# Patient Record
Sex: FEMALE
[De-identification: names, ages, dates, MRNs, and addresses within clinical notes are randomized per-mention and may not be internally consistent; named-entity substitution may affect disease eponyms.]

---

## 2020-11-11 ENCOUNTER — TRANSCRIPTION ENCOUNTER (OUTPATIENT)
Age: 70
End: 2020-11-11

## 2021-02-18 PROBLEM — Z00.00 ENCOUNTER FOR PREVENTIVE HEALTH EXAMINATION: Status: ACTIVE | Noted: 2021-02-18

## 2021-02-19 VITALS — WEIGHT: 180 LBS | HEIGHT: 64 IN | BODY MASS INDEX: 30.73 KG/M2

## 2021-02-22 ENCOUNTER — APPOINTMENT (OUTPATIENT)
Dept: NEUROSURGERY | Facility: CLINIC | Age: 71
End: 2021-02-22
Payer: MEDICAID

## 2021-02-22 DIAGNOSIS — Z78.9 OTHER SPECIFIED HEALTH STATUS: ICD-10-CM

## 2021-02-22 DIAGNOSIS — I10 ESSENTIAL (PRIMARY) HYPERTENSION: ICD-10-CM

## 2021-02-22 DIAGNOSIS — Z86.69 PERSONAL HISTORY OF OTHER DISEASES OF THE NERVOUS SYSTEM AND SENSE ORGANS: ICD-10-CM

## 2021-02-22 DIAGNOSIS — Z87.19 PERSONAL HISTORY OF OTHER DISEASES OF THE DIGESTIVE SYSTEM: ICD-10-CM

## 2021-02-22 PROCEDURE — 99241 OFFICE CONSULTATION NEW/ESTAB PATIENT 15 MIN: CPT

## 2021-02-22 RX ORDER — FAMOTIDINE 40 MG/1
40 TABLET, FILM COATED ORAL
Refills: 0 | Status: ACTIVE | COMMUNITY

## 2021-02-22 RX ORDER — LOSARTAN POTASSIUM AND HYDROCHLOROTHIAZIDE 12.5; 5 MG/1; MG/1
50-12.5 TABLET ORAL
Refills: 0 | Status: ACTIVE | COMMUNITY

## 2021-02-22 NOTE — PLAN
[FreeTextEntry1] : Reviewed MRI findings with the patient and family member, since her symptoms has been gradually improving with physical therapy, she will complete her sessions, and then follow up in 6 weeks for reassessment. All questions were answered. \par \par Case and MRI reviewed with Dr. Bustillo.

## 2021-02-22 NOTE — REASON FOR VISIT
[New Patient Visit] : a new patient visit [Referred By: _________] : Patient was referred by JAY [Family Member] : family member

## 2021-02-22 NOTE — HISTORY OF PRESENT ILLNESS
[FreeTextEntry1] : back pain  [de-identified] : This is a 70 yrs old [place]-speaking only female, who presents reporting of mid back pain radiating to the anterior and posterior legs down to the feet. Today she reports her back and leg pain has improved since participating in physical therapy. No longer has numbness in her lower extremities. Denies urinary and/or bowel incontinence. Denies gait/balance disturbances. \par \par MRI of the thoracic spine w/o contrast done on 2/04/21 showed at T12-L1 disc herniation.

## 2021-04-12 ENCOUNTER — APPOINTMENT (OUTPATIENT)
Dept: NEUROSURGERY | Facility: CLINIC | Age: 71
End: 2021-04-12
Payer: MEDICAID

## 2021-04-12 DIAGNOSIS — M51.25 OTHER INTERVERTEBRAL DISC DISPLACEMENT, THORACOLUMBAR REGION: ICD-10-CM

## 2021-04-12 PROCEDURE — 99212 OFFICE O/P EST SF 10 MIN: CPT

## 2021-04-12 NOTE — HISTORY OF PRESENT ILLNESS
[FreeTextEntry1] : Ms. Lopez, who initially presented last month with back pain radiating to anteroposterior legs, found to have T12-L1 disc herniation and left hip arthropathy, presents today after attending physical therapy. She reports her overall back pain is much better, only has pain in the morning, and when ambulating stairs.

## 2023-01-06 NOTE — PHYSICAL EXAM
[General Appearance - Alert] : alert [General Appearance - In No Acute Distress] : in no acute distress [Person] : oriented to person [Place] : oriented to place [Time] : oriented to time [Cranial Nerves Optic (II)] : visual acuity intact bilaterally,  pupils equal round and reactive to light [Cranial Nerves Oculomotor (III)] : extraocular motion intact [Cranial Nerves Trigeminal (V)] : facial sensation intact symmetrically [Cranial Nerves Facial (VII)] : face symmetrical [Cranial Nerves Vestibulocochlear (VIII)] : hearing was intact bilaterally [Cranial Nerves Glossopharyngeal (IX)] : tongue and palate midline [Cranial Nerves Accessory (XI - Cranial And Spinal)] : head turning and shoulder shrug symmetric [Cranial Nerves Hypoglossal (XII)] : there was no tongue deviation with protrusion [Motor Tone] : muscle tone was normal in all four extremities [Motor Strength] : muscle strength was normal in all four extremities [2+] : Patella left 2+ [Straight-Leg Raise Test - Left] : straight leg raise of the left leg was negative [Straight-Leg Raise Test - Right] : straight leg raise  of the right leg was negative [Antalgic] : antalgic [FreeTextEntry2] : tenderness to palpation on paraspinal muscles [FreeTextEntry5] : SHARIF + on left Him/He

## 2025-02-19 ENCOUNTER — NON-APPOINTMENT (OUTPATIENT)
Age: 75
End: 2025-02-19

## 2025-03-10 ENCOUNTER — NON-APPOINTMENT (OUTPATIENT)
Age: 75
End: 2025-03-10

## 2025-03-13 ENCOUNTER — APPOINTMENT (OUTPATIENT)
Dept: CARDIOTHORACIC SURGERY | Facility: CLINIC | Age: 75
End: 2025-03-13
Payer: MEDICARE

## 2025-03-13 ENCOUNTER — APPOINTMENT (OUTPATIENT)
Dept: CARDIOLOGY | Facility: CLINIC | Age: 75
End: 2025-03-13

## 2025-03-13 VITALS
SYSTOLIC BLOOD PRESSURE: 128 MMHG | TEMPERATURE: 98.3 F | HEART RATE: 90 BPM | HEIGHT: 64 IN | WEIGHT: 187 LBS | DIASTOLIC BLOOD PRESSURE: 85 MMHG | RESPIRATION RATE: 16 BRPM | BODY MASS INDEX: 31.92 KG/M2 | OXYGEN SATURATION: 96 %

## 2025-03-13 VITALS
TEMPERATURE: 98.3 F | HEART RATE: 90 BPM | BODY MASS INDEX: 31.92 KG/M2 | HEIGHT: 64 IN | DIASTOLIC BLOOD PRESSURE: 85 MMHG | SYSTOLIC BLOOD PRESSURE: 128 MMHG | WEIGHT: 187 LBS | OXYGEN SATURATION: 96 % | RESPIRATION RATE: 16 BRPM

## 2025-03-13 DIAGNOSIS — I34.0 NONRHEUMATIC MITRAL (VALVE) INSUFFICIENCY: ICD-10-CM

## 2025-03-13 DIAGNOSIS — R00.2 PALPITATIONS: ICD-10-CM

## 2025-03-13 PROCEDURE — 99204 OFFICE O/P NEW MOD 45 MIN: CPT

## 2025-03-13 RX ORDER — ACETAMINOPHEN 325 MG/1
325 TABLET ORAL
Refills: 0 | Status: ACTIVE | COMMUNITY

## 2025-03-13 RX ORDER — LOSARTAN POTASSIUM AND HYDROCHLOROTHIAZIDE 12.5; 5 MG/1; MG/1
50-12.5 TABLET ORAL
Refills: 0 | Status: ACTIVE | COMMUNITY

## 2025-03-13 RX ORDER — GLUCOSAMINE SULFATE DIPOT CHLR 1000 MG
TABLET ORAL
Refills: 0 | Status: ACTIVE | COMMUNITY

## 2025-03-13 RX ORDER — MELOXICAM 15 MG/1
15 TABLET ORAL
Refills: 0 | Status: ACTIVE | COMMUNITY

## 2025-03-13 RX ORDER — ASPIRIN 81 MG/1
81 TABLET, CHEWABLE ORAL
Refills: 0 | Status: ACTIVE | COMMUNITY

## 2025-03-13 RX ORDER — LORATADINE 10 MG/1
10 TABLET ORAL
Refills: 0 | Status: ACTIVE | COMMUNITY

## 2025-03-14 LAB
ALBUMIN SERPL ELPH-MCNC: 4.8 G/DL
ALP BLD-CCNC: 79 U/L
ALT SERPL-CCNC: 19 U/L
ANION GAP SERPL CALC-SCNC: 15 MMOL/L
AST SERPL-CCNC: 18 U/L
BILIRUB SERPL-MCNC: 0.3 MG/DL
BUN SERPL-MCNC: 34 MG/DL
CALCIUM SERPL-MCNC: 10 MG/DL
CHLORIDE SERPL-SCNC: 100 MMOL/L
CO2 SERPL-SCNC: 24 MMOL/L
CREAT SERPL-MCNC: 1.4 MG/DL
EGFRCR SERPLBLD CKD-EPI 2021: 40 ML/MIN/1.73M2
GLUCOSE SERPL-MCNC: 115 MG/DL
HCT VFR BLD CALC: 39.6 %
HGB BLD-MCNC: 12.9 G/DL
INR PPP: 0.92 RATIO
MCHC RBC-ENTMCNC: 27.9 PG
MCHC RBC-ENTMCNC: 32.6 G/DL
MCV RBC AUTO: 85.7 FL
NT-PROBNP SERPL-MCNC: 254 PG/ML
PLATELET # BLD AUTO: 311 K/UL
PMV BLD AUTO: 0 /100 WBCS
PMV BLD: 10.2 FL
POTASSIUM SERPL-SCNC: 3.9 MMOL/L
PROT SERPL-MCNC: 7.6 G/DL
PT BLD: 10.8 SEC
RBC # BLD: 4.62 M/UL
RBC # FLD: 12.7 %
SODIUM SERPL-SCNC: 139 MMOL/L
WBC # FLD AUTO: 8.73 K/UL

## 2025-03-18 ENCOUNTER — NON-APPOINTMENT (OUTPATIENT)
Age: 75
End: 2025-03-18

## 2025-03-18 PROBLEM — I34.0 NONRHEUMATIC MITRAL VALVE REGURGITATION: Status: ACTIVE | Noted: 2025-03-13

## 2025-04-12 VITALS
HEART RATE: 76 BPM | SYSTOLIC BLOOD PRESSURE: 137 MMHG | DIASTOLIC BLOOD PRESSURE: 60 MMHG | RESPIRATION RATE: 16 BRPM | WEIGHT: 186.95 LBS | HEIGHT: 64 IN | OXYGEN SATURATION: 98 %

## 2025-04-12 NOTE — H&P CARDIOLOGY - HISTORY OF PRESENT ILLNESS
Patient is a 74y Female PMH HTN, HLD, gastric ulcer, migraines  reports ARORA, fatigue and palpitations. Pt found to have severe MR on echo performed at outside facility (no report in EMR), recommend R/LHC and CAMRYN in preparation for for possible DAVID vs mitral repair    Pre cath note:  indication:  [ ] STEMI                [ ] NSTEMI                 [ ] Acute coronary syndrome                   [ ]Unstable Angina   [ ] high risk  [ ] intermediate risk  [ ] low risk                   [ ] Stable Angina     non-invasive testing:        echo                  Date:                     result: [ ] high risk  [ ] intermediate risk  [ ] low risk    Anti- Anginal medications:                    [ ] not used d/t                     [ ] used   ( ) BB     ( ) CCB      ( ) Nitrate   (  ) Ranexa          [ ] not used but strong indication not to use    Ejection Fraction                   [ ] <29            [ ] 30-39%   [ ] 40-49%     [ ]>50%    CHF          [ ] active (within last 14 days on meds   [ ] Chronic (on meds but no exacerbation)  NYHA Functional Class:  (  ) Class I (no limitations)  (  ) Class II (slight limitation)  (  ) Class III (marked limitation)  (  ) Class IV (symptoms at rest)    COPD                   [ ] mild (on chronic bronchodilators)  [ ] moderate (on chronic steroid therapy)      [ ] severe (indication for home O2 or PACO2 >50)    Other risk factors:                     [ ] Previous MI                     [ ] CVA/ stroke                    [ ] carotid stent/ CEA                    [ ] PVD/PAD- (arterial aneurysm, non-palpable pulses, tortuous vessel with inability to insert catheter, infra-renal dissection, renal or subclavian artery stenosis)                    [ ] previous CABG                    [ ] Renal Failure:  on HD  (  ) yes  (  ) no                    [ ] Diabetic  (  ) Type 1  (  ) Type 2                                         (  ) Insulin dependent  (  ) non-insulin dependent                                         (  ) Metformin  (  ) Januvia  (  ) Glimepiride  (  ) Glipizide  (  ) Glyburide  (  ) Actos                                         (  ) GLP-1 receptor agonists (Ozempic, Mounjaro, Wegovy, Zepbound, Trulicity, Byetta, Victoza)                                         (  ) SGLT2 Inhibitors (Farxiga, Jardiance, Invokana)                                         (  ) Other    Bleeding Risk: 2.3%    Pre-cath Hydration: (  )  cc IV bolus x 1 over 1 hr followed by:    (  ) NS @ 75cc/hr until procedure (up to 2 hrs) if EF> 50%                                                                                                                             (  ) NS @ 50cc/hr until procedure (up to 2 hrs) if EF< 50%                                        (  ) No precath hydration d/t      RIGHT RADIAL ARTERY EVALUATION:  FLORA TEST: [] Negative          [] Positive    EF: n/a  Date:    EKG:   Date: Patient is a 74y Female PMH HTN, HLD, gastric ulcer, migraines,  reports ARORA, fatigue and palpitations. Pt found to have severe MR on echo performed at outside facility (no report in EMR), recommend R/LHC and CAMRYN in preparation for for possible DAVID vs mitral repair    Pre cath note:  indication:  [ ] STEMI                [ ] NSTEMI                 [ ] Acute coronary syndrome                   [ ]Unstable Angina   [ ] high risk  [ ] intermediate risk  [ ] low risk                   [ ] Stable Angina     non-invasive testing:        echo                  Date:                     result: [ ] high risk  [x ] intermediate risk  [ ] low risk    Anti- Anginal medications:                    [ ] not used d/t                     [x ] used   ( ) BB     ( x) CCB      ( ) Nitrate   ( x ) Ranexa          [ ] not used but strong indication not to use    Ejection Fraction                   [ ] <29            [ ] 30-39%   [ ] 40-49%     [ ]>50%    CHF          [ ] active (within last 14 days on meds   [ ] Chronic (on meds but no exacerbation)  NYHA Functional Class:  (  ) Class I (no limitations)  ( x ) Class II (slight limitation)  (  ) Class III (marked limitation)  (  ) Class IV (symptoms at rest)    COPD                   [ ] mild (on chronic bronchodilators)  [ ] moderate (on chronic steroid therapy)      [ ] severe (indication for home O2 or PACO2 >50)    Other risk factors:                     [ ] Previous MI                     [ ] CVA/ stroke                    [ ] carotid stent/ CEA                    [ ] PVD/PAD- (arterial aneurysm, non-palpable pulses, tortuous vessel with inability to insert catheter, infra-renal dissection, renal or subclavian artery stenosis)                    [ ] previous CABG                    [ ] Renal Failure:  on HD  (  ) yes  (  ) no                    [ ] Diabetic  (  ) Type 1  (  ) Type 2                                         (  ) Insulin dependent  (  ) non-insulin dependent                                         (  ) Metformin  (  ) Januvia  (  ) Glimepiride  (  ) Glipizide  (  ) Glyburide  (  ) Actos                                         (  ) GLP-1 receptor agonists (Ozempic, Mounjaro, Wegovy, Zepbound, Trulicity, Byetta, Victoza)                                         (  ) SGLT2 Inhibitors (Farxiga, Jardiance, Invokana)                                         (  ) Other    Bleeding Risk: 2.4%    Pre-cath Hydration: (x  )  cc IV bolus x 1 over 1 hr followed by:    ( x ) NS @ 75cc/hr until procedure (up to 2 hrs) if EF> 50%                                                                                                                                 RIGHT RADIAL ARTERY EVALUATION:  FLORA TEST: [] Negative          [x] Positive    EF: n/a  Date:   Patient is a 74y Female PMH HTN, HLD, gastric ulcer, migraines,  reports ARORA, fatigue and palpitations. Pt found to have severe MR on echo performed at outside facility (no report in EMR), recommend R/LHC and CAMRYN in preparation for for possible DAVID vs mitral repair    Pre cath note:  indication:  [ ] STEMI                [ ] NSTEMI                 [ ] Acute coronary syndrome                   [ ]Unstable Angina   [ ] high risk  [ ] intermediate risk  [ ] low risk                   [ ] Stable Angina     non-invasive testing:        echo                  Date: 2/2025                    result: [ ] high risk  [x ] intermediate risk  [ ] low risk    Anti- Anginal medications:                    [ ] not used d/t                     [x ] used   ( ) BB     ( x) CCB      ( ) Nitrate   ( x ) Ranexa          [ ] not used but strong indication not to use    Ejection Fraction                   [ ] <29            [ ] 30-39%   [ ] 40-49%     [ ]>50%    CHF          [ ] active (within last 14 days on meds   [ ] Chronic (on meds but no exacerbation)  NYHA Functional Class:  (  ) Class I (no limitations)  ( x ) Class II (slight limitation)  (  ) Class III (marked limitation)  (  ) Class IV (symptoms at rest)    COPD                   [ ] mild (on chronic bronchodilators)  [ ] moderate (on chronic steroid therapy)      [ ] severe (indication for home O2 or PACO2 >50)    Other risk factors:                     [ ] Previous MI                     [ ] CVA/ stroke                    [ ] carotid stent/ CEA                    [ ] PVD/PAD- (arterial aneurysm, non-palpable pulses, tortuous vessel with inability to insert catheter, infra-renal dissection, renal or subclavian artery stenosis)                    [ ] previous CABG                    [ ] Renal Failure:  on HD  (  ) yes  (  ) no                    [ ] Diabetic  (  ) Type 1  (  ) Type 2                                         (  ) Insulin dependent  (  ) non-insulin dependent                                         (  ) Metformin  (  ) Januvia  (  ) Glimepiride  (  ) Glipizide  (  ) Glyburide  (  ) Actos                                         (  ) GLP-1 receptor agonists (Ozempic, Mounjaro, Wegovy, Zepbound, Trulicity, Byetta, Victoza)                                         (  ) SGLT2 Inhibitors (Farxiga, Jardiance, Invokana)                                         (  ) Other    Bleeding Risk: 2.4%    Pre-cath Hydration: (x  )  cc IV bolus x 1 over 1 hr followed by:    ( x ) NS @ 75cc/hr until procedure (up to 2 hrs) if EF> 50%                                                                                                                                 RIGHT RADIAL ARTERY EVALUATION:  FLORA TEST: [] Negative          [x] Positive     Patient is a 74y Female PMH HTN, HLD, gastric ulcer, migraines,  reports ARORA, fatigue and palpitations. Pt found to have severe MR on echo performed at outside facility (no report in EMR), recommend R/LHC and CMARYN in preparation for for possible DAVID vs mitral repair    [employer] of [place]  # [id number] used     Pre cath note:  indication:  [ ] STEMI                [ ] NSTEMI                 [ ] Acute coronary syndrome                   [ ]Unstable Angina   [ ] high risk  [ ] intermediate risk  [ ] low risk                   [ ] Stable Angina     non-invasive testing:        echo                  Date: 2/2025                    result: [ ] high risk  [x ] intermediate risk  [ ] low risk    Anti- Anginal medications:                    [ ] not used d/t                     [x ] used   ( ) BB     ( x) CCB      ( ) Nitrate   ( x ) Ranexa          [ ] not used but strong indication not to use    Ejection Fraction                   [ ] <29            [ ] 30-39%   [ ] 40-49%     [ ]>50%    CHF          [ ] active (within last 14 days on meds   [ ] Chronic (on meds but no exacerbation)  NYHA Functional Class:  (  ) Class I (no limitations)  ( x ) Class II (slight limitation)  (  ) Class III (marked limitation)  (  ) Class IV (symptoms at rest)    COPD                   [ ] mild (on chronic bronchodilators)  [ ] moderate (on chronic steroid therapy)      [ ] severe (indication for home O2 or PACO2 >50)    Other risk factors:                     [ ] Previous MI                     [ ] CVA/ stroke                    [ ] carotid stent/ CEA                    [ ] PVD/PAD- (arterial aneurysm, non-palpable pulses, tortuous vessel with inability to insert catheter, infra-renal dissection, renal or subclavian artery stenosis)                    [ ] previous CABG                    [ ] Renal Failure:  on HD  (  ) yes  (  ) no                    [ ] Diabetic  (  ) Type 1  (  ) Type 2                                         (  ) Insulin dependent  (  ) non-insulin dependent                                         (  ) Metformin  (  ) Januvia  (  ) Glimepiride  (  ) Glipizide  (  ) Glyburide  (  ) Actos                                         (  ) GLP-1 receptor agonists (Ozempic, Mounjaro, Wegovy, Zepbound, Trulicity, Byetta, Victoza)                                         (  ) SGLT2 Inhibitors (Farxiga, Jardiance, Invokana)                                         (  ) Other    Bleeding Risk: 2.4%    Pre-cath Hydration: (x  )  cc IV bolus x 1 over 1 hr followed by:    ( x ) NS @ 75cc/hr until procedure (up to 2 hrs) if EF> 50%                                                                                                                                 RIGHT RADIAL ARTERY EVALUATION:  FLORA TEST: [] Negative          [x] Positive

## 2025-04-15 ENCOUNTER — OUTPATIENT (OUTPATIENT)
Dept: OUTPATIENT SERVICES | Facility: HOSPITAL | Age: 75
LOS: 1 days | Discharge: HOME | End: 2025-04-15
Payer: MEDICAID

## 2025-04-15 ENCOUNTER — RESULT REVIEW (OUTPATIENT)
Age: 75
End: 2025-04-15

## 2025-04-15 VITALS
SYSTOLIC BLOOD PRESSURE: 125 MMHG | OXYGEN SATURATION: 95 % | DIASTOLIC BLOOD PRESSURE: 62 MMHG | HEART RATE: 68 BPM | RESPIRATION RATE: 15 BRPM

## 2025-04-15 DIAGNOSIS — Z98.890 OTHER SPECIFIED POSTPROCEDURAL STATES: Chronic | ICD-10-CM

## 2025-04-15 DIAGNOSIS — I34.0 NONRHEUMATIC MITRAL (VALVE) INSUFFICIENCY: ICD-10-CM

## 2025-04-15 LAB
ANION GAP SERPL CALC-SCNC: 12 MMOL/L — SIGNIFICANT CHANGE UP (ref 7–14)
BUN SERPL-MCNC: 29 MG/DL — HIGH (ref 10–20)
CALCIUM SERPL-MCNC: 10.2 MG/DL — SIGNIFICANT CHANGE UP (ref 8.4–10.5)
CHLORIDE SERPL-SCNC: 103 MMOL/L — SIGNIFICANT CHANGE UP (ref 98–110)
CO2 SERPL-SCNC: 23 MMOL/L — SIGNIFICANT CHANGE UP (ref 17–32)
CREAT SERPL-MCNC: 1.1 MG/DL — SIGNIFICANT CHANGE UP (ref 0.7–1.5)
EGFR: 53 ML/MIN/1.73M2 — LOW
EGFR: 53 ML/MIN/1.73M2 — LOW
GLUCOSE SERPL-MCNC: 107 MG/DL — HIGH (ref 70–99)
HCT VFR BLD CALC: 36.2 % — LOW (ref 37–47)
HGB BLD-MCNC: 12.3 G/DL — SIGNIFICANT CHANGE UP (ref 12–16)
MCHC RBC-ENTMCNC: 29 PG — SIGNIFICANT CHANGE UP (ref 27–31)
MCHC RBC-ENTMCNC: 34 G/DL — SIGNIFICANT CHANGE UP (ref 32–37)
MCV RBC AUTO: 85.4 FL — SIGNIFICANT CHANGE UP (ref 81–99)
NRBC BLD AUTO-RTO: 0 /100 WBCS — SIGNIFICANT CHANGE UP (ref 0–0)
PLATELET # BLD AUTO: 251 K/UL — SIGNIFICANT CHANGE UP (ref 130–400)
PMV BLD: 9.7 FL — SIGNIFICANT CHANGE UP (ref 7.4–10.4)
POTASSIUM SERPL-MCNC: 4.3 MMOL/L — SIGNIFICANT CHANGE UP (ref 3.5–5)
POTASSIUM SERPL-SCNC: 4.3 MMOL/L — SIGNIFICANT CHANGE UP (ref 3.5–5)
RBC # BLD: 4.24 M/UL — SIGNIFICANT CHANGE UP (ref 4.2–5.4)
RBC # FLD: 13.3 % — SIGNIFICANT CHANGE UP (ref 11.5–14.5)
SODIUM SERPL-SCNC: 138 MMOL/L — SIGNIFICANT CHANGE UP (ref 135–146)
WBC # BLD: 6.58 K/UL — SIGNIFICANT CHANGE UP (ref 4.8–10.8)
WBC # FLD AUTO: 6.58 K/UL — SIGNIFICANT CHANGE UP (ref 4.8–10.8)

## 2025-04-15 PROCEDURE — 36415 COLL VENOUS BLD VENIPUNCTURE: CPT

## 2025-04-15 PROCEDURE — 85027 COMPLETE CBC AUTOMATED: CPT

## 2025-04-15 PROCEDURE — 80048 BASIC METABOLIC PNL TOTAL CA: CPT

## 2025-04-15 PROCEDURE — C1894: CPT

## 2025-04-15 PROCEDURE — C1887: CPT

## 2025-04-15 PROCEDURE — C1769: CPT

## 2025-04-15 PROCEDURE — 93306 TTE W/DOPPLER COMPLETE: CPT

## 2025-04-15 PROCEDURE — 93306 TTE W/DOPPLER COMPLETE: CPT | Mod: 26

## 2025-04-15 PROCEDURE — 93458 L HRT ARTERY/VENTRICLE ANGIO: CPT

## 2025-04-15 PROCEDURE — 93458 L HRT ARTERY/VENTRICLE ANGIO: CPT | Mod: 26

## 2025-04-15 RX ORDER — AMLODIPINE BESYLATE 10 MG/1
2.5 TABLET ORAL ONCE
Refills: 0 | Status: COMPLETED | OUTPATIENT
Start: 2025-04-15 | End: 2025-04-15

## 2025-04-15 RX ORDER — LORATADINE 5 MG/5ML
1 SOLUTION ORAL
Refills: 0 | DISCHARGE

## 2025-04-15 RX ORDER — ACETAMINOPHEN 500 MG/5ML
0 LIQUID (ML) ORAL
Refills: 0 | DISCHARGE

## 2025-04-15 RX ORDER — ASPIRIN 325 MG
81 TABLET ORAL ONCE
Refills: 0 | Status: COMPLETED | OUTPATIENT
Start: 2025-04-15 | End: 2025-04-15

## 2025-04-15 RX ORDER — LOSARTAN POTASSIUM 100 MG/1
1 TABLET, FILM COATED ORAL
Refills: 0 | DISCHARGE

## 2025-04-15 RX ORDER — RANOLAZINE 1000 MG/1
500 TABLET, FILM COATED, EXTENDED RELEASE ORAL ONCE
Refills: 0 | Status: COMPLETED | OUTPATIENT
Start: 2025-04-15 | End: 2025-04-15

## 2025-04-15 RX ORDER — MELOXICAM 15 MG/1
0 TABLET ORAL
Refills: 0 | DISCHARGE

## 2025-04-15 RX ORDER — ASPIRIN 325 MG
0 TABLET ORAL
Refills: 0 | DISCHARGE

## 2025-04-15 RX ADMIN — AMLODIPINE BESYLATE 2.5 MILLIGRAM(S): 10 TABLET ORAL at 09:44

## 2025-04-15 RX ADMIN — Medication 81 MILLIGRAM(S): at 09:40

## 2025-04-15 RX ADMIN — Medication 250 MILLILITER(S): at 09:40

## 2025-04-15 RX ADMIN — RANOLAZINE 500 MILLIGRAM(S): 1000 TABLET, FILM COATED, EXTENDED RELEASE ORAL at 09:44

## 2025-04-15 NOTE — ASU DISCHARGE PLAN (ADULT/PEDIATRIC) - ASU DC SPECIAL INSTRUCTIONSFT
Activity:  - Do not drive or operate heavy machinery for 24 hours.  - Limit your physical or any strenuous activity for 2 weeks after angioplasty and 48 hours for angiogram. Support the groin site with your hand when you sneeze or cough. No heavy lifting ( objects more then 10 pounds).  - For wrist access, avoid using affected arm for 24 hours after removal of dressing and avoid heavy lifting for 7 days.  Hygiene:  - After 24 hours, you may shower and remove the dressing from the site. Do not tub bathe for one week. Do not rub or apply lotion, cream, powder to the affected site. Leave it open to air.   Diet:   - You may resume your diet. Low Sodium. Low Fat, Low Cholesterol.  If Diabetic - Carbohydrate Consistent Diet.      - Drink extra fluid unless otherwise advised.   Special Instructions:  - Bruising or black and blue at the puncture site is possible.  - If there is bleeding from the puncture site (groin or wrist) apply direct firm pressure on the site and call 911.  - Any sudden swelling, redness, fever, discharge or severe pain, call your physician or call the cath lab.   - If you notice any scab formation in the area avoid touching the site and allow it to heal.  - Numbness or "pins and needle" sensation in the affected arm, hand, leg or if the affected site become cool to touch or pale that persist for extended period of time call your physician immediately to be checked.  - If you developed chest pain, not relieved by your usual routine medication, fainting, lethargy, weakness, report to the nearest emergency room.   - Inform your Dentist or Surgeon if you are taking Aspirin or any antiplatelet medications. Report any bleeding in your urine or stool.   Medications:  - Soreness or tenderness at the site is possible it will diminish over time. You may take Tylenol every 4-6 hours as needed. Nothing stronger is needed.  - If you are diabetic and taking medication containing Metformin, do not take them for 48 hours after the procedure.     Any questions call Cardiac Cath Lab at [phone] or [phone], Monday - Friday from 7 - 9 pm.

## 2025-04-15 NOTE — H&P CARDIOLOGY - HISTORY OF PRESENT ILLNESS
74 year old F with PMHx of HTN and severe MR on outside echo presents for CAMRYN in preparation for DAVID 74 year old F with PMHx of HTN and severe MR on outside echo presents for CAMRYN in preparation for DAVID vs surgical mitral repair 74 F referred for elective CAMRYN to evaluate severe MR.

## 2025-04-15 NOTE — ASU DISCHARGE PLAN (ADULT/PEDIATRIC) - CARE PROVIDER_API CALL
Danis Valenzuela  Interventional Cardiology  [address] [place] [address], [address]  [place] [address]  Phone: [phone]  Fax: [phone]  Follow Up Time: 2 weeks

## 2025-04-15 NOTE — ASU DISCHARGE PLAN (ADULT/PEDIATRIC) - SIGNS AND SYMPTOMS OF INFECTION: FEVER, REDNESS, SWELLING, FOUL SMELLING DISCHARGE
I have read and agree with the documentation of Mercedes Vee RN. Kate Urbina RN 
 
 Statement Selected

## 2025-04-15 NOTE — ASU DISCHARGE PLAN (ADULT/PEDIATRIC) - FINANCIAL ASSISTANCE
[facility] provides services at a reduced cost to those who are determined to be eligible through [facility]’s financial assistance program. Information regarding [facility]’s financial assistance program can be found by going to [email or web address] or by calling [phone].

## 2025-04-15 NOTE — CHART NOTE - NSCHARTNOTEFT_GEN_A_CORE
PRE-OP DIAGNOSIS:    Severe Mitral regurgitation    PROCEDURE:     [x] Coronary Angiogram     [x] LHC     [] LVG     [] RHC     [] Intervention (see below)         PHYSICIAN:  Danis Chau    ASSISTANT: Dr OSWALD Vasquez     PROCEDURE DESCRIPTION:     Consent:      [x] Patient     [] Family Member     []  Used        Anesthesia:     [] General     [x] Sedation     [x] Local        Access & Closure:     [x] 6 Fr Right Radial Artery (TR band closure)     [] Fr Femoral Artery     [] Fr Femoral Vein     [] Fr Brachial Vein       IV Contrast: 50 mL        Intervention: None      Implants: None       FINDINGS:     Coronary Dominance: Left      LM: No disease    LAD: Minor luminal irregularities in the distal LAD otherwise no disease   D1: No disease noted    CX: Minor Luminal Irregularities in the distal LCX otherwise no disease  OM1: Minor Luminal Irregularities   LPDA: Minor Luminal Irregularities    RCA: Mild disease in the Prox RCA.  - Small non-dominant vessel       LVEDP: 8 mmHg     EF: %        ESTIMATED BLOOD LOSS: < 10 mL        CONDITION:     [x] Good     [] Fair     [] Critical        SPECIMEN REMOVED: N/A       POST-OP DIAGNOSIS:      [X] Mild Coronary Artery Disease (< 50% stenosis)           PLAN OF CARE:     [X] D/C Home Today     [x] Medications: Losartan 50 QD,    [X] IV Fluids: NS @ 100/hr till the patient is discharged.

## 2025-04-24 ENCOUNTER — APPOINTMENT (OUTPATIENT)
Dept: CARDIOTHORACIC SURGERY | Facility: CLINIC | Age: 75
End: 2025-04-24

## 2025-04-24 ENCOUNTER — APPOINTMENT (OUTPATIENT)
Dept: CARDIOLOGY | Facility: CLINIC | Age: 75
End: 2025-04-24
Payer: COMMERCIAL

## 2025-04-24 VITALS
SYSTOLIC BLOOD PRESSURE: 133 MMHG | TEMPERATURE: 98.2 F | OXYGEN SATURATION: 93 % | HEIGHT: 64 IN | DIASTOLIC BLOOD PRESSURE: 81 MMHG | WEIGHT: 188 LBS | HEART RATE: 102 BPM | BODY MASS INDEX: 32.1 KG/M2 | RESPIRATION RATE: 16 BRPM

## 2025-04-24 PROCEDURE — 99214 OFFICE O/P EST MOD 30 MIN: CPT

## 2025-04-25 PROBLEM — I10 ESSENTIAL (PRIMARY) HYPERTENSION: Chronic | Status: ACTIVE | Noted: 2025-04-15

## 2025-04-25 PROBLEM — E78.5 HYPERLIPIDEMIA, UNSPECIFIED: Chronic | Status: ACTIVE | Noted: 2025-04-15

## 2025-04-25 PROBLEM — K25.9 GASTRIC ULCER, UNSPECIFIED AS ACUTE OR CHRONIC, WITHOUT HEMORRHAGE OR PERFORATION: Chronic | Status: ACTIVE | Noted: 2025-04-15

## 2025-04-30 ENCOUNTER — OUTPATIENT (OUTPATIENT)
Dept: OUTPATIENT SERVICES | Facility: HOSPITAL | Age: 75
LOS: 1 days | End: 2025-04-30
Payer: MEDICAID

## 2025-04-30 ENCOUNTER — RESULT REVIEW (OUTPATIENT)
Age: 75
End: 2025-04-30

## 2025-04-30 ENCOUNTER — NON-APPOINTMENT (OUTPATIENT)
Age: 75
End: 2025-04-30

## 2025-04-30 VITALS
DIASTOLIC BLOOD PRESSURE: 76 MMHG | SYSTOLIC BLOOD PRESSURE: 124 MMHG | RESPIRATION RATE: 18 BRPM | WEIGHT: 188.05 LBS | OXYGEN SATURATION: 99 % | HEART RATE: 74 BPM | TEMPERATURE: 98 F | HEIGHT: 64 IN

## 2025-04-30 DIAGNOSIS — Z98.890 OTHER SPECIFIED POSTPROCEDURAL STATES: Chronic | ICD-10-CM

## 2025-04-30 DIAGNOSIS — Z01.818 ENCOUNTER FOR OTHER PREPROCEDURAL EXAMINATION: ICD-10-CM

## 2025-04-30 DIAGNOSIS — I34.0 NONRHEUMATIC MITRAL (VALVE) INSUFFICIENCY: ICD-10-CM

## 2025-04-30 DIAGNOSIS — I34.9 NONRHEUMATIC MITRAL VALVE DISORDER, UNSPECIFIED: ICD-10-CM

## 2025-04-30 LAB
A1C WITH ESTIMATED AVERAGE GLUCOSE RESULT: 5.8 % — HIGH (ref 4–5.6)
ALBUMIN SERPL ELPH-MCNC: 4.6 G/DL — SIGNIFICANT CHANGE UP (ref 3.5–5.2)
ALP SERPL-CCNC: 73 U/L — SIGNIFICANT CHANGE UP (ref 30–115)
ALT FLD-CCNC: 17 U/L — SIGNIFICANT CHANGE UP (ref 0–41)
ANION GAP SERPL CALC-SCNC: 17 MMOL/L — HIGH (ref 7–14)
APPEARANCE UR: CLEAR — SIGNIFICANT CHANGE UP
APTT BLD: 30 SEC — SIGNIFICANT CHANGE UP (ref 27–39.2)
AST SERPL-CCNC: 16 U/L — SIGNIFICANT CHANGE UP (ref 0–41)
BACTERIA # UR AUTO: NEGATIVE /HPF — SIGNIFICANT CHANGE UP
BASOPHILS # BLD AUTO: 0.04 K/UL — SIGNIFICANT CHANGE UP (ref 0–0.2)
BASOPHILS NFR BLD AUTO: 0.5 % — SIGNIFICANT CHANGE UP (ref 0–1)
BILIRUB SERPL-MCNC: 0.3 MG/DL — SIGNIFICANT CHANGE UP (ref 0.2–1.2)
BILIRUB UR-MCNC: NEGATIVE — SIGNIFICANT CHANGE UP
BLD GP AB SCN SERPL QL: SIGNIFICANT CHANGE UP
BUN SERPL-MCNC: 41 MG/DL — HIGH (ref 10–20)
CALCIUM SERPL-MCNC: 9.5 MG/DL — SIGNIFICANT CHANGE UP (ref 8.4–10.5)
CAST: 11 /LPF — HIGH (ref 0–4)
CHLORIDE SERPL-SCNC: 100 MMOL/L — SIGNIFICANT CHANGE UP (ref 98–110)
CO2 SERPL-SCNC: 22 MMOL/L — SIGNIFICANT CHANGE UP (ref 17–32)
COLOR SPEC: YELLOW — SIGNIFICANT CHANGE UP
CREAT SERPL-MCNC: 1.4 MG/DL — SIGNIFICANT CHANGE UP (ref 0.7–1.5)
DIFF PNL FLD: ABNORMAL
EGFR: 40 ML/MIN/1.73M2 — LOW
EGFR: 40 ML/MIN/1.73M2 — LOW
EOSINOPHIL # BLD AUTO: 0.07 K/UL — SIGNIFICANT CHANGE UP (ref 0–0.7)
EOSINOPHIL NFR BLD AUTO: 0.9 % — SIGNIFICANT CHANGE UP (ref 0–8)
ESTIMATED AVERAGE GLUCOSE: 120 MG/DL — HIGH (ref 68–114)
GLUCOSE SERPL-MCNC: 113 MG/DL — HIGH (ref 70–99)
GLUCOSE UR QL: NEGATIVE MG/DL — SIGNIFICANT CHANGE UP
HCT VFR BLD CALC: 37.8 % — SIGNIFICANT CHANGE UP (ref 37–47)
HGB BLD-MCNC: 12.6 G/DL — SIGNIFICANT CHANGE UP (ref 12–16)
IMM GRANULOCYTES NFR BLD AUTO: 0.4 % — HIGH (ref 0.1–0.3)
INR BLD: 0.93 RATIO — SIGNIFICANT CHANGE UP (ref 0.65–1.3)
KETONES UR-MCNC: NEGATIVE MG/DL — SIGNIFICANT CHANGE UP
LEUKOCYTE ESTERASE UR-ACNC: ABNORMAL
LYMPHOCYTES # BLD AUTO: 1.52 K/UL — SIGNIFICANT CHANGE UP (ref 1.2–3.4)
LYMPHOCYTES # BLD AUTO: 19.8 % — LOW (ref 20.5–51.1)
MCHC RBC-ENTMCNC: 27.9 PG — SIGNIFICANT CHANGE UP (ref 27–31)
MCHC RBC-ENTMCNC: 33.3 G/DL — SIGNIFICANT CHANGE UP (ref 32–37)
MCV RBC AUTO: 83.8 FL — SIGNIFICANT CHANGE UP (ref 81–99)
MONOCYTES # BLD AUTO: 0.45 K/UL — SIGNIFICANT CHANGE UP (ref 0.1–0.6)
MONOCYTES NFR BLD AUTO: 5.9 % — SIGNIFICANT CHANGE UP (ref 1.7–9.3)
MRSA PCR RESULT.: NEGATIVE — SIGNIFICANT CHANGE UP
NEUTROPHILS # BLD AUTO: 5.57 K/UL — SIGNIFICANT CHANGE UP (ref 1.4–6.5)
NEUTROPHILS NFR BLD AUTO: 72.5 % — SIGNIFICANT CHANGE UP (ref 42.2–75.2)
NITRITE UR-MCNC: NEGATIVE — SIGNIFICANT CHANGE UP
NRBC BLD AUTO-RTO: 0 /100 WBCS — SIGNIFICANT CHANGE UP (ref 0–0)
NT-PROBNP SERPL-SCNC: 112 PG/ML — SIGNIFICANT CHANGE UP (ref 0–300)
PH UR: 5.5 — SIGNIFICANT CHANGE UP (ref 5–8)
PLATELET # BLD AUTO: 301 K/UL — SIGNIFICANT CHANGE UP (ref 130–400)
PMV BLD: 10 FL — SIGNIFICANT CHANGE UP (ref 7.4–10.4)
POTASSIUM SERPL-MCNC: 3.7 MMOL/L — SIGNIFICANT CHANGE UP (ref 3.5–5)
POTASSIUM SERPL-SCNC: 3.7 MMOL/L — SIGNIFICANT CHANGE UP (ref 3.5–5)
PROT SERPL-MCNC: 7.4 G/DL — SIGNIFICANT CHANGE UP (ref 6–8)
PROT UR-MCNC: NEGATIVE MG/DL — SIGNIFICANT CHANGE UP
PROTHROM AB SERPL-ACNC: 10.9 SEC — SIGNIFICANT CHANGE UP (ref 9.95–12.87)
RBC # BLD: 4.51 M/UL — SIGNIFICANT CHANGE UP (ref 4.2–5.4)
RBC # FLD: 13.2 % — SIGNIFICANT CHANGE UP (ref 11.5–14.5)
RBC CASTS # UR COMP ASSIST: 30 /HPF — HIGH (ref 0–4)
SODIUM SERPL-SCNC: 139 MMOL/L — SIGNIFICANT CHANGE UP (ref 135–146)
SP GR SPEC: 1.02 — SIGNIFICANT CHANGE UP (ref 1–1.03)
SQUAMOUS # UR AUTO: 2 /HPF — SIGNIFICANT CHANGE UP (ref 0–5)
UROBILINOGEN FLD QL: 0.2 MG/DL — SIGNIFICANT CHANGE UP (ref 0.2–1)
WBC # BLD: 7.68 K/UL — SIGNIFICANT CHANGE UP (ref 4.8–10.8)
WBC # FLD AUTO: 7.68 K/UL — SIGNIFICANT CHANGE UP (ref 4.8–10.8)
WBC UR QL: 3 /HPF — SIGNIFICANT CHANGE UP (ref 0–5)

## 2025-04-30 PROCEDURE — 93320 DOPPLER ECHO COMPLETE: CPT | Mod: 26

## 2025-04-30 PROCEDURE — 94729 DIFFUSING CAPACITY: CPT | Mod: 26

## 2025-04-30 PROCEDURE — 93010 ELECTROCARDIOGRAM REPORT: CPT

## 2025-04-30 PROCEDURE — 93321 DOPPLER ECHO F-UP/LMTD STD: CPT | Mod: 26,76

## 2025-04-30 PROCEDURE — 93312 ECHO TRANSESOPHAGEAL: CPT | Mod: 26,XU

## 2025-04-30 PROCEDURE — 93320 DOPPLER ECHO COMPLETE: CPT

## 2025-04-30 PROCEDURE — 94727 GAS DIL/WSHOT DETER LNG VOL: CPT | Mod: 26

## 2025-04-30 PROCEDURE — 71046 X-RAY EXAM CHEST 2 VIEWS: CPT | Mod: 26

## 2025-04-30 PROCEDURE — 93325 DOPPLER ECHO COLOR FLOW MAPG: CPT

## 2025-04-30 PROCEDURE — 94060 EVALUATION OF WHEEZING: CPT | Mod: 26

## 2025-04-30 PROCEDURE — 93325 DOPPLER ECHO COLOR FLOW MAPG: CPT | Mod: 26,59

## 2025-04-30 PROCEDURE — 76376 3D RENDER W/INTRP POSTPROCES: CPT | Mod: 26

## 2025-04-30 PROCEDURE — 93312 ECHO TRANSESOPHAGEAL: CPT

## 2025-04-30 RX ORDER — LOSARTAN POTASSIUM 100 MG/1
1 TABLET, FILM COATED ORAL
Refills: 0 | DISCHARGE

## 2025-04-30 RX ORDER — ASPIRIN 325 MG
0 TABLET ORAL
Refills: 0 | DISCHARGE

## 2025-04-30 RX ORDER — ACETAMINOPHEN 500 MG/5ML
500 LIQUID (ML) ORAL
Refills: 0 | DISCHARGE

## 2025-04-30 RX ORDER — LORATADINE 5 MG/5ML
1 SOLUTION ORAL
Refills: 0 | DISCHARGE

## 2025-04-30 RX ORDER — MELOXICAM 15 MG/1
15 TABLET ORAL
Refills: 0 | DISCHARGE

## 2025-04-30 NOTE — H&P PST ADULT - HISTORY OF PRESENT ILLNESS
74 yr old female to past for above procedure  h/o severe mr noted on echo  pmh htn dld noted to have colon fatigue palpitations  had cath no sig cad echo nl lvsf severe mr     Pt reports no cardiopulmonary issues denies sob/colon/cp/palpitations. Pt states no recent infections no fever no cough no uti uri. Stated exercise tolerance is   1  flights no changes. Royal screen revd.  Anesthesia Alert  NO--Difficult Airway  NO--History of neck surgery or radiation  NO--Limited ROM of neck  NO--History of Malignant hyperthermia  NO--Personal or family history of Pseudocholinesterase deficiency.  NO--Prior Anesthesia Complication  NO--Latex Allergy  NO--Loose teeth  NO--History of Rheumatoid Arthritis  NO--ROYAL  NO--Bleeding risk  NO--Other_____  Revised Cardiac Risk Index for Pre-Operative Risk from [email or web address]  on 4/30/2025  ** All calculations should be rechecked by clinician prior to use **    RESULT SUMMARY:  1 points  RCRI Score    6.0 %  Risk of major cardiac event      INPUTS:  High-risk surgery —> 1 = Yes  History of ischemic heart disease —> 0 = No  History of congestive heart failure —> 0 = No  History of cerebrovascular disease —> 0 = No  Pre-operative treatment with insulin —> 0 = No  Pre-operative creatinine >2 mg/dL / 176.8 µmol/L —> 0 = No     74 yr old female to past for above procedure presents with daughter in law dameon pt primary language is [place] some english wants daughter in law to serve as  services offered and refused   h/o severe mr noted on echo  pmh htn dld noted to have colon fatigue palpitations  had cath no sig cad echo nl lvsf severe mr     Pt reports no cardiopulmonary issues denies sob/colon/cp/palpitations. Pt states no recent infections no fever no cough no uti uri. Stated exercise tolerance is   1  flights no changes. Royal screen revd.  Anesthesia Alert  NO--Difficult Airway  NO--History of neck surgery or radiation  NO--Limited ROM of neck  NO--History of Malignant hyperthermia  NO--Personal or family history of Pseudocholinesterase deficiency.  NO--Prior Anesthesia Complication  NO--Latex Allergy  NO--Loose teeth  NO--History of Rheumatoid Arthritis  NO--ROYAL  NO--Bleeding risk  NO--Other_____  Revised Cardiac Risk Index for Pre-Operative Risk from [email or web address]  on 4/30/2025  ** All calculations should be rechecked by clinician prior to use **    RESULT SUMMARY:  1 points  RCRI Score    6.0 %  Risk of major cardiac event      INPUTS:  High-risk surgery —> 1 = Yes  History of ischemic heart disease —> 0 = No  History of congestive heart failure —> 0 = No  History of cerebrovascular disease —> 0 = No  Pre-operative treatment with insulin —> 0 = No  Pre-operative creatinine >2 mg/dL / 176.8 µmol/L —> 0 = No     74 yr old female to past for above procedure presents with daughter in law dameon pt primary language is [place] some english wants daughter in law to serve as  services offered and refused   h/o severe mr noted on echo  pmh htn dld noted to have colon fatigue palpitations  had cath no sig cad echo nl lvsf severe mr pt had barney cath lab this am     Pt reports no cardiopulmonary issues denies sob/colon/cp/palpitations. Pt states no recent infections no fever no cough no uti uri. Stated exercise tolerance is   1  flights no changes. Royal screen revd.  Anesthesia Alert  NO--Difficult Airway  NO--History of neck surgery or radiation  NO--Limited ROM of neck  NO--History of Malignant hyperthermia  NO--Personal or family history of Pseudocholinesterase deficiency.  NO--Prior Anesthesia Complication  NO--Latex Allergy  NO--Loose teeth  NO--History of Rheumatoid Arthritis  NO--ROYAL  NO--Bleeding risk  NO--Other_____  Revised Cardiac Risk Index for Pre-Operative Risk from [employer]  on 4/30/2025  ** All calculations should be rechecked by clinician prior to use **    RESULT SUMMARY:  1 points  RCRI Score    6.0 %  Risk of major cardiac event      INPUTS:  High-risk surgery —> 1 = Yes  History of ischemic heart disease —> 0 = No  History of congestive heart failure —> 0 = No  History of cerebrovascular disease —> 0 = No  Pre-operative treatment with insulin —> 0 = No  Pre-operative creatinine >2 mg/dL / 176.8 µmol/L —> 0 = No

## 2025-04-30 NOTE — H&P PST ADULT - OTHER CARE PROVIDERS
cardio chandrakant and liya ( pre op note 4/24 /25 in allscripts  low to intermediate risk for sx MV repair   echo revd nl lvsf   severe mr

## 2025-04-30 NOTE — H&P PST ADULT - NSICDXPASTMEDICALHX_GEN_ALL_CORE_FT
PAST MEDICAL HISTORY:  Benign essential HTN     Gastric ulcer     Hyperlipidemia     Mitral regurgitation

## 2025-04-30 NOTE — CHART NOTE - NSCHARTNOTEFT_GEN_A_CORE
TRANSESOPHAGEAL ECHOCARDIOGRAM PROCEDURE NOTE    PRE-OP DIAGNOSIS:  MR    POST-OP DIAGNOSIS:  Moderate MR    PROCEDURE:  Transesophageal echocardiogram    Primary Physician:  Sunil  Fellow:  ALONZO Devi    ANESTHESIA TYPE  [ ]  General Anesthesia  [X] Conscious Sedation  [ ]  Local/Regional    CONDITION  [ ] Critical  [ ] Serious  [ ] Fair  [X] Good    SPECIMENS REMOVED (IF APPLICABLE):  N/A    IMPLANTS (IF APPLICABLE):  N/A    ESTIMATED BLOOD LOSS:  None    COMPLICATIONS:  None    Risks and benefits of procedures were explained and informed consent was obtained.   Topical anesthetic to the oropharynx with viscous lidocaine and benzocaine spray.  Refer to Anesthesia documentation for sedation details.  The CAMRYN probe was passed into the esophagus and removed without difficulty.  No evidence of bleeding.  The patient tolerated the procedure well without any immediate complications.    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

## 2025-04-30 NOTE — H&P PST ADULT - NSHP PST DIAGEKG REVIEWED YN_GEN_A_CORE
Health Maintenance Due   Topic Date Due   • Shingles Vaccine (2 of 3) 12/03/2013   • Medicare Wellness 65+  11/23/2019       Patient is due for topics as listed above but is not proceeding with Immunization(s) Shingles at this time.      Unaddressed Risk Adjusted HCC Categories and Diagnoses  HCC 23 - Significant Endocrine and Metabolic Disorders   Unaddressed Dx:Secondary Hyperparathyroidism (Cms/Hcc)  HCC 85 - Congestive Heart Failure   Unaddressed Dx:Dilated Cardiomyopathy (Cms/Hcc)   - Vascular Disease with Complications   Unaddressed Dx:Venous Stasis Ulcer With Edema Of Lower Leg (Cms/Hcc)   - Chronic Kidney Disease, Stage 5   Unaddressed Dx:End Stage Renal Disease (Cms/Hcc)       Yes

## 2025-04-30 NOTE — H&P PST ADULT - DOES PATIENT HAVE ADVANCE DIRECTIVE
Detail Level: Detailed Consent: The patient's consent was obtained including but not limited to risks of crusting, scabbing, blistering, scarring, darker or lighter pigmentary change, recurrence, incomplete removal and infection. Post-Care Instructions: I reviewed with the patient in detail post-care instructions. Patient is to wear sunprotection, and avoid picking at any of the treated lesions. Pt may apply Vaseline to crusted or scabbing areas. Anesthesia Volume In Cc: 0.5 No

## 2025-05-01 ENCOUNTER — APPOINTMENT (OUTPATIENT)
Dept: CARDIOTHORACIC SURGERY | Facility: CLINIC | Age: 75
End: 2025-05-01
Payer: COMMERCIAL

## 2025-05-01 ENCOUNTER — APPOINTMENT (OUTPATIENT)
Dept: PULMONOLOGY | Facility: HOSPITAL | Age: 75
End: 2025-05-01

## 2025-05-01 VITALS
HEIGHT: 64 IN | SYSTOLIC BLOOD PRESSURE: 123 MMHG | HEART RATE: 85 BPM | TEMPERATURE: 98.3 F | DIASTOLIC BLOOD PRESSURE: 69 MMHG | WEIGHT: 186 LBS | OXYGEN SATURATION: 94 % | RESPIRATION RATE: 16 BRPM | BODY MASS INDEX: 31.76 KG/M2

## 2025-05-01 DIAGNOSIS — Z01.818 ENCOUNTER FOR OTHER PREPROCEDURAL EXAMINATION: ICD-10-CM

## 2025-05-01 DIAGNOSIS — I34.0 NONRHEUMATIC MITRAL (VALVE) INSUFFICIENCY: ICD-10-CM

## 2025-05-01 DIAGNOSIS — I34.9 NONRHEUMATIC MITRAL VALVE DISORDER, UNSPECIFIED: ICD-10-CM

## 2025-05-01 PROCEDURE — 99204 OFFICE O/P NEW MOD 45 MIN: CPT

## 2025-05-01 RX ORDER — FUROSEMIDE 20 MG/1
20 TABLET ORAL DAILY
Qty: 30 | Refills: 0 | Status: ACTIVE | COMMUNITY
Start: 2025-05-01 | End: 1900-01-01

## 2025-05-02 ENCOUNTER — APPOINTMENT (OUTPATIENT)
Dept: PULMONOLOGY | Facility: HOSPITAL | Age: 75
End: 2025-05-02

## 2025-05-02 LAB
CULTURE RESULTS: SIGNIFICANT CHANGE UP
SPECIMEN SOURCE: SIGNIFICANT CHANGE UP

## 2025-05-06 ENCOUNTER — NON-APPOINTMENT (OUTPATIENT)
Age: 75
End: 2025-05-06

## 2025-05-09 VITALS
HEIGHT: 64.02 IN | WEIGHT: 188.05 LBS | SYSTOLIC BLOOD PRESSURE: 161 MMHG | DIASTOLIC BLOOD PRESSURE: 84 MMHG | OXYGEN SATURATION: 98 % | TEMPERATURE: 98 F | HEART RATE: 85 BPM | RESPIRATION RATE: 20 BRPM

## 2025-05-09 NOTE — PRE-OP CHECKLIST - RESPIRATORY RATE (BREATHS/MIN)
Georgina gN is a 65 y.o. female. HPI:  Georgina Ng, was evaluated through a synchronous (real-time) audio-video encounter. The patient (or guardian if applicable) is aware that this is a billable service, which includes applicable co-pays. This Virtual Visit was conducted with patient's (and/or legal guardian's) consent. The visit was conducted pursuant to the emergency declaration under the [facility] [place], [address] [place] [address] authority and the [employer] Act. Patient identification was verified, and a caregiver was present when appropriate. The patient was located in a state where the provider was licensed to provide care. --Elza Jose MD on 3/31/2022 at 9:14 AM    An electronic signature was used to authenticate this note. Telephone visit for allergy symptoms associated with postnasal drip causing her throat to be sore and also making her nauseated  + PND, and clearing throat     + allergies . .. Sneezing postnasal drip which is causing throat soreness and some nausea  Requesting refill of her allergy meds and note for work, she has not felt well enough to go to work  No significant abdominal pain, no sinus pressure or fever    Needs a work note to be off yesterday today and possibly tomorrow    Patient has had 2 covid vaccine       Wt Readings from Last 3 Encounters:   03/18/22 148 lb (67.1 kg)   03/14/22 149 lb (67.6 kg)   12/13/21 145 lb (65.8 kg)       REVIEW OF SYSTEMS:   CONSTITUTIONAL: See history of present illness,   Weight noted   HEENT: No new vision difficulties or ringing in the ears. RESPIRATORY: No new SOB, PND, orthopnea or cough. CARDIOVASCULAR: no CP, palpitations or SOB with exertion  GI: No nausea, vomiting, diarrhea, constipation, abdominal pain or changes in bowel habits. : No urinary frequency, urgency, incontinence hematuria or dysuria. SKIN: No cyanosis or skin lesions. MUSCULOSKELETAL: No new muscle or joint pain. NEUROLOGICAL: No syncope or TIA-like symptoms. PSYCHIATRIC: No anxiety, insomnia or depression     Allergies   Allergen Reactions    Pcn [Penicillins] Other (See Comments)       Prior to Visit Medications    Medication Sig Taking? Authorizing Provider   fluticasone (FLONASE) 50 MCG/ACT nasal spray 2 sprays by Each Nostril route daily Yes Bobby Deras MD   loratadine (CLARITIN) 10 MG tablet Take 1 tablet by mouth daily Yes Bobby Deras MD   lisdexamfetamine (VYVANSE) 30 MG capsule Take 1 capsule by mouth every morning for 30 days.  Yes Sy Ugarte MD   naproxen (NAPROSYN) 500 MG tablet Take 1 tablet by mouth 2 times daily (with meals) for 21 days Yes Jeannette Goel MD   cyclobenzaprine (FLEXERIL) 10 MG tablet Take 1 tablet by mouth 3 times daily as needed for Muscle spasms Yes Jeannette Goel MD   ibuprofen (ADVIL;MOTRIN) 800 MG tablet Take 1 tablet by mouth 3 times daily (with meals) Yes ANDREW Chris CNP   ondansetron (ZOFRAN-ODT) 4 MG disintegrating tablet Take 1 tablet by mouth 3 times daily as needed for Nausea or Vomiting Yes ANDREW Chris CNP   pantoprazole (PROTONIX) 40 MG tablet Take 1 tablet by mouth every morning (before breakfast) Yes Sy Ugarte MD   FLUoxetine (PROZAC) 10 MG capsule Take 1 capsule by mouth daily Yes Sy Ugarte MD   lidocaine (LMX) 4 % cream Apply topically as needed bid Yes Sy Ugarte MD   albuterol sulfate HFA (PROAIR HFA) 108 (90 Base) MCG/ACT inhaler Inhale 2 puffs into the lungs every 4 hours as needed for Wheezing Yes Sy Ugarte MD   Promethazine-Phenylephrine [facility]) 6.25-5 MG/5ML syrup Take 5 mLs by mouth every 6 hours Yes ANDREW Lund CNP       Past Medical History:   Diagnosis Date    ADD (attention deficit disorder) 3/4/2016       Social History     Tobacco Use    Smoking status: Current Every Day Smoker     Packs/day: 0.25     Years: 10.00     Pack years: 2.50     Types: Cigarettes    Smokeless tobacco: Never Used   Substance Use Topics    Alcohol use: Yes     Alcohol/week: 0.0 standard drinks     Comment: occ       Family History   Problem Relation Age of Onset    Diabetes Father        OBJECTIVE:  LMP 01/24/2019   GEN:  alert and pleasant , in NAD, some upper respiratory congestion  No chest pain or shortness of breath  Denies rash or edema of her extremity      ASSESSMENT/PLAN:  1. Seasonal allergies  Refill allergy meds  Warm salt water gargles for any throat  - fluticasone (FLONASE) 50 MCG/ACT nasal spray; 2 sprays by Each Nostril route daily  Dispense: 16 g; Refill: 2  - loratadine (CLARITIN) 10 MG tablet; Take 1 tablet by mouth daily  Dispense: 30 tablet; Refill: 3    Follow-up in the office if symptoms persist or worsen  Fever, sinus pressure, thick nasal discharge    2. Postnasal drip  Restart allergy med  - fluticasone (FLONASE) 50 MCG/ACT nasal spray; 2 sprays by Each Nostril route daily  Dispense: 16 g; Refill: 2  - loratadine (CLARITIN) 10 MG tablet; Take 1 tablet by mouth daily  Dispense: 30 tablet; Refill: 3    3. Throat soreness  Warm salt water gargles alternate Tylenol with Advil as needed  Follow-up if symptoms persist or worsen    4.  Need for COVID-19 vaccine  Recommend booster vaccine when feeling well enough      15 Total Minutes spent pre charting (reviewing problem list, reviewing health maintenance items , meds, any test results, consultant and hospital notes ) and  obtaining present visit history, performing appropriate medical exam/evaluation, counseling and educating the patient (and family), ordering medications ,tests, and procedures as needed, refilling medication(s), placing referral(s) when needed in addition to coordinating care for this patient and documenting in electronic health record 20

## 2025-05-09 NOTE — PATIENT PROFILE ADULT - FUNCTIONAL ASSESSMENT - DAILY ACTIVITY 4.
Detail Level: Detailed
Quality 110: Preventive Care And Screening: Influenza Immunization: Influenza Immunization not Administered for Documented Reasons.
4 = No assist / stand by assistance

## 2025-05-12 ENCOUNTER — TRANSCRIPTION ENCOUNTER (OUTPATIENT)
Age: 75
End: 2025-05-12

## 2025-05-12 ENCOUNTER — INPATIENT (INPATIENT)
Facility: HOSPITAL | Age: 75
LOS: 7 days | Discharge: HOME | DRG: 163 | End: 2025-05-20
Attending: THORACIC SURGERY (CARDIOTHORACIC VASCULAR SURGERY) | Admitting: THORACIC SURGERY (CARDIOTHORACIC VASCULAR SURGERY)
Payer: COMMERCIAL

## 2025-05-12 ENCOUNTER — RESULT REVIEW (OUTPATIENT)
Age: 75
End: 2025-05-12

## 2025-05-12 ENCOUNTER — APPOINTMENT (OUTPATIENT)
Dept: CARDIOTHORACIC SURGERY | Facility: HOSPITAL | Age: 75
End: 2025-05-12

## 2025-05-12 VITALS — WEIGHT: 187.39 LBS | HEIGHT: 63.78 IN

## 2025-05-12 DIAGNOSIS — I34.9 NONRHEUMATIC MITRAL VALVE DISORDER, UNSPECIFIED: ICD-10-CM

## 2025-05-12 DIAGNOSIS — Z98.890 OTHER SPECIFIED POSTPROCEDURAL STATES: Chronic | ICD-10-CM

## 2025-05-12 LAB
ALBUMIN SERPL ELPH-MCNC: 3.7 G/DL — SIGNIFICANT CHANGE UP (ref 3.5–5.2)
ALP SERPL-CCNC: 39 U/L — SIGNIFICANT CHANGE UP (ref 30–115)
ALT FLD-CCNC: 23 U/L — SIGNIFICANT CHANGE UP (ref 0–41)
ANION GAP SERPL CALC-SCNC: 11 MMOL/L — SIGNIFICANT CHANGE UP (ref 7–14)
APTT BLD: 16.6 SEC — CRITICAL LOW (ref 27–39.2)
APTT BLD: 28.9 SEC — SIGNIFICANT CHANGE UP (ref 27–39.2)
APTT BLD: 35.2 SEC — SIGNIFICANT CHANGE UP (ref 27–39.2)
AST SERPL-CCNC: 54 U/L — HIGH (ref 0–41)
BASOPHILS # BLD AUTO: 0.02 K/UL — SIGNIFICANT CHANGE UP (ref 0–0.2)
BASOPHILS # BLD AUTO: 0.04 K/UL — SIGNIFICANT CHANGE UP (ref 0–0.2)
BASOPHILS NFR BLD AUTO: 0.1 % — SIGNIFICANT CHANGE UP (ref 0–1)
BASOPHILS NFR BLD AUTO: 0.2 % — SIGNIFICANT CHANGE UP (ref 0–1)
BILIRUB SERPL-MCNC: 1.1 MG/DL — SIGNIFICANT CHANGE UP (ref 0.2–1.2)
BLD GP AB SCN SERPL QL: SIGNIFICANT CHANGE UP
BUN SERPL-MCNC: 20 MG/DL — SIGNIFICANT CHANGE UP (ref 10–20)
CALCIUM SERPL-MCNC: 8.5 MG/DL — SIGNIFICANT CHANGE UP (ref 8.4–10.5)
CHLORIDE SERPL-SCNC: 110 MMOL/L — SIGNIFICANT CHANGE UP (ref 98–110)
CO2 SERPL-SCNC: 21 MMOL/L — SIGNIFICANT CHANGE UP (ref 17–32)
CREAT SERPL-MCNC: 0.9 MG/DL — SIGNIFICANT CHANGE UP (ref 0.7–1.5)
EGFR: 68 ML/MIN/1.73M2 — SIGNIFICANT CHANGE UP
EGFR: 68 ML/MIN/1.73M2 — SIGNIFICANT CHANGE UP
EOSINOPHIL # BLD AUTO: 0 K/UL — SIGNIFICANT CHANGE UP (ref 0–0.7)
EOSINOPHIL # BLD AUTO: 0.11 K/UL — SIGNIFICANT CHANGE UP (ref 0–0.7)
EOSINOPHIL NFR BLD AUTO: 0 % — SIGNIFICANT CHANGE UP (ref 0–8)
EOSINOPHIL NFR BLD AUTO: 0.7 % — SIGNIFICANT CHANGE UP (ref 0–8)
FIBRINOGEN PPP-MCNC: 199 MG/DL — LOW (ref 200–435)
FIBRINOGEN PPP-MCNC: 221 MG/DL — SIGNIFICANT CHANGE UP (ref 200–435)
FIBRINOGEN PPP-MCNC: 304 MG/DL — SIGNIFICANT CHANGE UP (ref 200–435)
GAS PNL BLDA: SIGNIFICANT CHANGE UP
GLUCOSE BLDC GLUCOMTR-MCNC: 102 MG/DL — HIGH (ref 70–99)
GLUCOSE BLDC GLUCOMTR-MCNC: 125 MG/DL — HIGH (ref 70–99)
GLUCOSE BLDC GLUCOMTR-MCNC: 137 MG/DL — HIGH (ref 70–99)
GLUCOSE BLDC GLUCOMTR-MCNC: 144 MG/DL — HIGH (ref 70–99)
GLUCOSE BLDC GLUCOMTR-MCNC: 149 MG/DL — HIGH (ref 70–99)
GLUCOSE BLDC GLUCOMTR-MCNC: 210 MG/DL — HIGH (ref 70–99)
GLUCOSE BLDC GLUCOMTR-MCNC: 51 MG/DL — CRITICAL LOW (ref 70–99)
GLUCOSE SERPL-MCNC: 190 MG/DL — HIGH (ref 70–99)
HCT VFR BLD CALC: 22.2 % — LOW (ref 37–47)
HCT VFR BLD CALC: 24.7 % — LOW (ref 37–47)
HCT VFR BLD CALC: 28.4 % — LOW (ref 37–47)
HGB BLD-MCNC: 7.6 G/DL — LOW (ref 12–16)
HGB BLD-MCNC: 8.5 G/DL — LOW (ref 12–16)
HGB BLD-MCNC: 9.8 G/DL — LOW (ref 12–16)
IMM GRANULOCYTES NFR BLD AUTO: 1.7 % — HIGH (ref 0.1–0.3)
IMM GRANULOCYTES NFR BLD AUTO: 3.9 % — HIGH (ref 0.1–0.3)
INR BLD: 1.09 RATIO — SIGNIFICANT CHANGE UP (ref 0.65–1.3)
INR BLD: 1.11 RATIO — SIGNIFICANT CHANGE UP (ref 0.65–1.3)
INR BLD: 1.25 RATIO — SIGNIFICANT CHANGE UP (ref 0.65–1.3)
LYMPHOCYTES # BLD AUTO: 1.11 K/UL — LOW (ref 1.2–3.4)
LYMPHOCYTES # BLD AUTO: 14.6 % — LOW (ref 20.5–51.1)
LYMPHOCYTES # BLD AUTO: 2.41 K/UL — SIGNIFICANT CHANGE UP (ref 1.2–3.4)
LYMPHOCYTES # BLD AUTO: 8 % — LOW (ref 20.5–51.1)
MAGNESIUM SERPL-MCNC: 2.5 MG/DL — HIGH (ref 1.8–2.4)
MCHC RBC-ENTMCNC: 28.7 PG — SIGNIFICANT CHANGE UP (ref 27–31)
MCHC RBC-ENTMCNC: 29.3 PG — SIGNIFICANT CHANGE UP (ref 27–31)
MCHC RBC-ENTMCNC: 29.6 PG — SIGNIFICANT CHANGE UP (ref 27–31)
MCHC RBC-ENTMCNC: 34.2 G/DL — SIGNIFICANT CHANGE UP (ref 32–37)
MCHC RBC-ENTMCNC: 34.4 G/DL — SIGNIFICANT CHANGE UP (ref 32–37)
MCHC RBC-ENTMCNC: 34.5 G/DL — SIGNIFICANT CHANGE UP (ref 32–37)
MCV RBC AUTO: 83 FL — SIGNIFICANT CHANGE UP (ref 81–99)
MCV RBC AUTO: 85.2 FL — SIGNIFICANT CHANGE UP (ref 81–99)
MCV RBC AUTO: 86.4 FL — SIGNIFICANT CHANGE UP (ref 81–99)
MONOCYTES # BLD AUTO: 0.13 K/UL — SIGNIFICANT CHANGE UP (ref 0.1–0.6)
MONOCYTES # BLD AUTO: 0.96 K/UL — HIGH (ref 0.1–0.6)
MONOCYTES NFR BLD AUTO: 0.8 % — LOW (ref 1.7–9.3)
MONOCYTES NFR BLD AUTO: 6.9 % — SIGNIFICANT CHANGE UP (ref 1.7–9.3)
NEUTROPHILS # BLD AUTO: 11.56 K/UL — HIGH (ref 1.4–6.5)
NEUTROPHILS # BLD AUTO: 13.15 K/UL — HIGH (ref 1.4–6.5)
NEUTROPHILS NFR BLD AUTO: 79.8 % — HIGH (ref 42.2–75.2)
NEUTROPHILS NFR BLD AUTO: 83.3 % — HIGH (ref 42.2–75.2)
NRBC BLD AUTO-RTO: 0 /100 WBCS — SIGNIFICANT CHANGE UP (ref 0–0)
PLATELET # BLD AUTO: 171 K/UL — SIGNIFICANT CHANGE UP (ref 130–400)
PLATELET # BLD AUTO: 190 K/UL — SIGNIFICANT CHANGE UP (ref 130–400)
PLATELET # BLD AUTO: 192 K/UL — SIGNIFICANT CHANGE UP (ref 130–400)
PMV BLD: 10 FL — SIGNIFICANT CHANGE UP (ref 7.4–10.4)
PMV BLD: 10.1 FL — SIGNIFICANT CHANGE UP (ref 7.4–10.4)
PMV BLD: 9.7 FL — SIGNIFICANT CHANGE UP (ref 7.4–10.4)
POTASSIUM SERPL-MCNC: 4.6 MMOL/L — SIGNIFICANT CHANGE UP (ref 3.5–5)
POTASSIUM SERPL-SCNC: 4.6 MMOL/L — SIGNIFICANT CHANGE UP (ref 3.5–5)
PROT SERPL-MCNC: 5 G/DL — LOW (ref 6–8)
PROTHROM AB SERPL-ACNC: 12.9 SEC — HIGH (ref 9.95–12.87)
PROTHROM AB SERPL-ACNC: 13.1 SEC — HIGH (ref 9.95–12.87)
PROTHROM AB SERPL-ACNC: 14.8 SEC — HIGH (ref 9.95–12.87)
RBC # BLD: 2.57 M/UL — LOW (ref 4.2–5.4)
RBC # BLD: 2.9 M/UL — LOW (ref 4.2–5.4)
RBC # BLD: 3.42 M/UL — LOW (ref 4.2–5.4)
RBC # FLD: 12.9 % — SIGNIFICANT CHANGE UP (ref 11.5–14.5)
RBC # FLD: 12.9 % — SIGNIFICANT CHANGE UP (ref 11.5–14.5)
RBC # FLD: 14.6 % — HIGH (ref 11.5–14.5)
SODIUM SERPL-SCNC: 142 MMOL/L — SIGNIFICANT CHANGE UP (ref 135–146)
WBC # BLD: 12.74 K/UL — HIGH (ref 4.8–10.8)
WBC # BLD: 13.89 K/UL — HIGH (ref 4.8–10.8)
WBC # BLD: 16.49 K/UL — HIGH (ref 4.8–10.8)
WBC # FLD AUTO: 12.74 K/UL — HIGH (ref 4.8–10.8)
WBC # FLD AUTO: 13.89 K/UL — HIGH (ref 4.8–10.8)
WBC # FLD AUTO: 16.49 K/UL — HIGH (ref 4.8–10.8)

## 2025-05-12 PROCEDURE — 94640 AIRWAY INHALATION TREATMENT: CPT

## 2025-05-12 PROCEDURE — 84132 ASSAY OF SERUM POTASSIUM: CPT

## 2025-05-12 PROCEDURE — 97110 THERAPEUTIC EXERCISES: CPT | Mod: GP

## 2025-05-12 PROCEDURE — 36430 TRANSFUSION BLD/BLD COMPNT: CPT

## 2025-05-12 PROCEDURE — C1889: CPT

## 2025-05-12 PROCEDURE — 97162 PT EVAL MOD COMPLEX 30 MIN: CPT | Mod: GP

## 2025-05-12 PROCEDURE — 85025 COMPLETE CBC W/AUTO DIFF WBC: CPT

## 2025-05-12 PROCEDURE — 94003 VENT MGMT INPAT SUBQ DAY: CPT

## 2025-05-12 PROCEDURE — 93325 DOPPLER ECHO COLOR FLOW MAPG: CPT

## 2025-05-12 PROCEDURE — P9035: CPT

## 2025-05-12 PROCEDURE — 97530 THERAPEUTIC ACTIVITIES: CPT | Mod: GP

## 2025-05-12 PROCEDURE — 84100 ASSAY OF PHOSPHORUS: CPT

## 2025-05-12 PROCEDURE — 86850 RBC ANTIBODY SCREEN: CPT

## 2025-05-12 PROCEDURE — 83735 ASSAY OF MAGNESIUM: CPT

## 2025-05-12 PROCEDURE — 93318 ECHO TRANSESOPHAGEAL INTRAOP: CPT

## 2025-05-12 PROCEDURE — L8699: CPT

## 2025-05-12 PROCEDURE — 33430 REPLACEMENT OF MITRAL VALVE: CPT | Mod: 80

## 2025-05-12 PROCEDURE — 85730 THROMBOPLASTIN TIME PARTIAL: CPT

## 2025-05-12 PROCEDURE — 86891 AUTOLOGOUS BLOOD OP SALVAGE: CPT

## 2025-05-12 PROCEDURE — P9012: CPT

## 2025-05-12 PROCEDURE — 82330 ASSAY OF CALCIUM: CPT

## 2025-05-12 PROCEDURE — 85027 COMPLETE CBC AUTOMATED: CPT

## 2025-05-12 PROCEDURE — 84295 ASSAY OF SERUM SODIUM: CPT

## 2025-05-12 PROCEDURE — 93312 ECHO TRANSESOPHAGEAL: CPT

## 2025-05-12 PROCEDURE — C1729: CPT

## 2025-05-12 PROCEDURE — 93010 ELECTROCARDIOGRAM REPORT: CPT

## 2025-05-12 PROCEDURE — 99291 CRITICAL CARE FIRST HOUR: CPT

## 2025-05-12 PROCEDURE — 94010 BREATHING CAPACITY TEST: CPT

## 2025-05-12 PROCEDURE — P9047: CPT | Mod: JZ

## 2025-05-12 PROCEDURE — 94760 N-INVAS EAR/PLS OXIMETRY 1: CPT

## 2025-05-12 PROCEDURE — 85384 FIBRINOGEN ACTIVITY: CPT

## 2025-05-12 PROCEDURE — C1894: CPT

## 2025-05-12 PROCEDURE — P9037: CPT

## 2025-05-12 PROCEDURE — 88305 TISSUE EXAM BY PATHOLOGIST: CPT

## 2025-05-12 PROCEDURE — 86901 BLOOD TYPING SEROLOGIC RH(D): CPT

## 2025-05-12 PROCEDURE — 93005 ELECTROCARDIOGRAM TRACING: CPT

## 2025-05-12 PROCEDURE — P9100: CPT

## 2025-05-12 PROCEDURE — 94002 VENT MGMT INPAT INIT DAY: CPT

## 2025-05-12 PROCEDURE — 82803 BLOOD GASES ANY COMBINATION: CPT

## 2025-05-12 PROCEDURE — 36415 COLL VENOUS BLD VENIPUNCTURE: CPT

## 2025-05-12 PROCEDURE — 85610 PROTHROMBIN TIME: CPT

## 2025-05-12 PROCEDURE — P9016: CPT

## 2025-05-12 PROCEDURE — 71045 X-RAY EXAM CHEST 1 VIEW: CPT

## 2025-05-12 PROCEDURE — 92960 CARDIOVERSION ELECTRIC EXT: CPT

## 2025-05-12 PROCEDURE — 33430 REPLACEMENT OF MITRAL VALVE: CPT

## 2025-05-12 PROCEDURE — 88305 TISSUE EXAM BY PATHOLOGIST: CPT | Mod: 26

## 2025-05-12 PROCEDURE — 83605 ASSAY OF LACTIC ACID: CPT

## 2025-05-12 PROCEDURE — 85018 HEMOGLOBIN: CPT

## 2025-05-12 PROCEDURE — C1751: CPT

## 2025-05-12 PROCEDURE — 93320 DOPPLER ECHO COMPLETE: CPT

## 2025-05-12 PROCEDURE — 86965 POOLING BLOOD PLATELETS: CPT

## 2025-05-12 PROCEDURE — 80053 COMPREHEN METABOLIC PANEL: CPT

## 2025-05-12 PROCEDURE — P9045: CPT | Mod: JZ

## 2025-05-12 PROCEDURE — 71045 X-RAY EXAM CHEST 1 VIEW: CPT | Mod: 26

## 2025-05-12 PROCEDURE — 82962 GLUCOSE BLOOD TEST: CPT

## 2025-05-12 PROCEDURE — 85014 HEMATOCRIT: CPT

## 2025-05-12 PROCEDURE — 97116 GAIT TRAINING THERAPY: CPT | Mod: GP

## 2025-05-12 PROCEDURE — 86923 COMPATIBILITY TEST ELECTRIC: CPT

## 2025-05-12 PROCEDURE — 86900 BLOOD TYPING SEROLOGIC ABO: CPT

## 2025-05-12 RX ORDER — DEXTROSE 50 % IN WATER 50 %
50 SYRINGE (ML) INTRAVENOUS
Refills: 0 | Status: DISCONTINUED | OUTPATIENT
Start: 2025-05-12 | End: 2025-05-15

## 2025-05-12 RX ORDER — FENTANYL CITRATE-0.9 % NACL/PF 100MCG/2ML
25 SYRINGE (ML) INTRAVENOUS EVERY 4 HOURS
Refills: 0 | Status: DISCONTINUED | OUTPATIENT
Start: 2025-05-12 | End: 2025-05-15

## 2025-05-12 RX ORDER — VANCOMYCIN HCL IN 5 % DEXTROSE 1.5G/250ML
1000 PLASTIC BAG, INJECTION (ML) INTRAVENOUS EVERY 12 HOURS
Refills: 0 | Status: COMPLETED | OUTPATIENT
Start: 2025-05-12 | End: 2025-05-14

## 2025-05-12 RX ORDER — DOBUTAMINE 250 MG/20ML
1 INJECTION INTRAVENOUS
Qty: 500 | Refills: 0 | Status: DISCONTINUED | OUTPATIENT
Start: 2025-05-12 | End: 2025-05-13

## 2025-05-12 RX ORDER — NICARDIPINE HCL 30 MG
5 CAPSULE ORAL
Qty: 40 | Refills: 0 | Status: DISCONTINUED | OUTPATIENT
Start: 2025-05-12 | End: 2025-05-13

## 2025-05-12 RX ORDER — IPRATROPIUM BROMIDE AND ALBUTEROL SULFATE .5; 2.5 MG/3ML; MG/3ML
3 SOLUTION RESPIRATORY (INHALATION) EVERY 6 HOURS
Refills: 0 | Status: DISCONTINUED | OUTPATIENT
Start: 2025-05-12 | End: 2025-05-14

## 2025-05-12 RX ORDER — BISACODYL 5 MG
10 TABLET, DELAYED RELEASE (ENTERIC COATED) ORAL ONCE
Refills: 0 | Status: DISCONTINUED | OUTPATIENT
Start: 2025-05-14 | End: 2025-05-20

## 2025-05-12 RX ORDER — POLYETHYLENE GLYCOL 3350 17 G/17G
17 POWDER, FOR SOLUTION ORAL DAILY
Refills: 0 | Status: DISCONTINUED | OUTPATIENT
Start: 2025-05-13 | End: 2025-05-20

## 2025-05-12 RX ORDER — SENNA 187 MG
2 TABLET ORAL AT BEDTIME
Refills: 0 | Status: DISCONTINUED | OUTPATIENT
Start: 2025-05-13 | End: 2025-05-20

## 2025-05-12 RX ORDER — CEFAZOLIN SODIUM IN 0.9 % NACL 3 G/100 ML
2000 INTRAVENOUS SOLUTION, PIGGYBACK (ML) INTRAVENOUS EVERY 8 HOURS
Refills: 0 | Status: COMPLETED | OUTPATIENT
Start: 2025-05-12 | End: 2025-05-14

## 2025-05-12 RX ORDER — ACETAMINOPHEN 500 MG/5ML
650 LIQUID (ML) ORAL EVERY 6 HOURS
Refills: 0 | Status: DISCONTINUED | OUTPATIENT
Start: 2025-05-12 | End: 2025-05-14

## 2025-05-12 RX ORDER — VASOPRESSIN 20 [USP'U]/ML
0.04 INJECTION INTRAVENOUS
Qty: 40 | Refills: 0 | Status: DISCONTINUED | OUTPATIENT
Start: 2025-05-12 | End: 2025-05-13

## 2025-05-12 RX ORDER — DEXMEDETOMIDINE HYDROCHLORIDE IN SODIUM CHLORIDE 4 UG/ML
0.2 INJECTION INTRAVENOUS
Qty: 200 | Refills: 0 | Status: DISCONTINUED | OUTPATIENT
Start: 2025-05-12 | End: 2025-05-12

## 2025-05-12 RX ORDER — NOREPINEPHRINE BITARTRATE 8 MG
0.05 SOLUTION INTRAVENOUS
Qty: 8 | Refills: 0 | Status: DISCONTINUED | OUTPATIENT
Start: 2025-05-12 | End: 2025-05-13

## 2025-05-12 RX ORDER — HYDROMORPHONE/SOD CHLOR,ISO/PF 2 MG/10 ML
0.5 SYRINGE (ML) INJECTION EVERY 6 HOURS
Refills: 0 | Status: DISCONTINUED | OUTPATIENT
Start: 2025-05-12 | End: 2025-05-12

## 2025-05-12 RX ORDER — GABAPENTIN 400 MG/1
100 CAPSULE ORAL EVERY 8 HOURS
Refills: 0 | Status: COMPLETED | OUTPATIENT
Start: 2025-05-12 | End: 2025-05-17

## 2025-05-12 RX ORDER — OXYCODONE HYDROCHLORIDE 30 MG/1
5 TABLET ORAL EVERY 4 HOURS
Refills: 0 | Status: DISCONTINUED | OUTPATIENT
Start: 2025-05-12 | End: 2025-05-19

## 2025-05-12 RX ORDER — DEXMEDETOMIDINE HYDROCHLORIDE IN SODIUM CHLORIDE 4 UG/ML
1 INJECTION INTRAVENOUS
Qty: 400 | Refills: 0 | Status: DISCONTINUED | OUTPATIENT
Start: 2025-05-12 | End: 2025-05-13

## 2025-05-12 RX ORDER — DEXTROSE 50 % IN WATER 50 %
25 SYRINGE (ML) INTRAVENOUS
Refills: 0 | Status: DISCONTINUED | OUTPATIENT
Start: 2025-05-12 | End: 2025-05-15

## 2025-05-12 RX ORDER — ALBUTEROL SULFATE 2.5 MG/3ML
2 VIAL, NEBULIZER (ML) INHALATION EVERY 6 HOURS
Refills: 0 | Status: DISCONTINUED | OUTPATIENT
Start: 2025-05-12 | End: 2025-05-13

## 2025-05-12 RX ORDER — OXYCODONE HYDROCHLORIDE 30 MG/1
10 TABLET ORAL EVERY 4 HOURS
Refills: 0 | Status: DISCONTINUED | OUTPATIENT
Start: 2025-05-12 | End: 2025-05-17

## 2025-05-12 RX ORDER — PROPOFOL 10 MG/ML
15 INJECTION, EMULSION INTRAVENOUS
Qty: 1000 | Refills: 0 | Status: DISCONTINUED | OUTPATIENT
Start: 2025-05-12 | End: 2025-05-12

## 2025-05-12 RX ORDER — ACETAMINOPHEN 500 MG/5ML
1000 LIQUID (ML) ORAL ONCE
Refills: 0 | Status: COMPLETED | OUTPATIENT
Start: 2025-05-12 | End: 2025-05-13

## 2025-05-12 RX ORDER — PROPOFOL 10 MG/ML
15 INJECTION, EMULSION INTRAVENOUS
Qty: 1000 | Refills: 0 | Status: DISCONTINUED | OUTPATIENT
Start: 2025-05-12 | End: 2025-05-13

## 2025-05-12 RX ADMIN — Medication 1 PUFF(S): at 21:39

## 2025-05-12 RX ADMIN — Medication 5 UNIT(S)/HR: at 20:04

## 2025-05-12 RX ADMIN — Medication 25 MG/HR: at 20:04

## 2025-05-12 RX ADMIN — Medication 20 MILLILITER(S): at 20:03

## 2025-05-12 RX ADMIN — DEXMEDETOMIDINE HYDROCHLORIDE IN SODIUM CHLORIDE 4.25 MICROGRAM(S)/KG/HR: 4 INJECTION INTRAVENOUS at 21:13

## 2025-05-12 RX ADMIN — DOBUTAMINE 2.55 MICROGRAM(S)/KG/MIN: 250 INJECTION INTRAVENOUS at 20:04

## 2025-05-12 RX ADMIN — PROPOFOL 7.65 MICROGRAM(S)/KG/MIN: 10 INJECTION, EMULSION INTRAVENOUS at 19:11

## 2025-05-12 RX ADMIN — Medication 100 MILLIGRAM(S): at 21:13

## 2025-05-12 RX ADMIN — DEXMEDETOMIDINE HYDROCHLORIDE IN SODIUM CHLORIDE 4.25 MICROGRAM(S)/KG/HR: 4 INJECTION INTRAVENOUS at 20:04

## 2025-05-12 NOTE — BRIEF OPERATIVE NOTE - NSICDXBRIEFPROCEDURE_GEN_ALL_CORE_FT
PROCEDURES:  Replacement, mitral valve, with CAMRYN, adult 12-May-2025 15:38:58 31 MM Braxton Vergara

## 2025-05-12 NOTE — DISCHARGE NOTE PROVIDER - CARE PROVIDER_API CALL
GONZALES ZAMUDIO MD  Phone: [phone]  Fax: [phone]  Follow Up Time:     Reji Guillaume  Thoracic and Cardiac Surgery  [address] [place] [address], [address] [address]  Phone: [phone]  Fax: [phone]  Follow Up Time:

## 2025-05-12 NOTE — DISCHARGE NOTE PROVIDER - NSDCFUADDINST_GEN_ALL_CORE_FT
You are being discharged with a MCOT heart rate monitor. This device will monitor your heart rate for 28 days after your TAVR surgery. You may shower with the device in place but do not submerge in water. You must follow the directions in the box to change the dressing every week. Carry the phone provided with you at all times. The phone transmits the signal from the monitor to your cardiologist. Charge the PHONE every night and keep it near you when doing so. Charge the MONITOR each week when you change the dressing. At the end of 28 days, place the sensor and phone in the  box and place in mailing envelope. The envelope can then be placed in your mailbox to return to the company. If there is any issue with your heart rate, you will recieved a phone call from the Cardiologist with further instructions. You can call the CT Surgery office with any questions.    You have a follow-up appointment with .    Please follow up with your Primary Care Physician / Cardiologist in  2-4 weeks from discharge.    Please  your prescribed medications upon discharge from [facility] located on the 1st floor here at [facility].

## 2025-05-12 NOTE — DISCHARGE NOTE PROVIDER - NSDCHHHOMEBOUNDOTHER_GEN_ALL_CORE_FT
Patient will be discharged with a [employer] MCOT monitor to evaluate for potential conduction or rhythm disturbances post TAVR.

## 2025-05-12 NOTE — DISCHARGE NOTE PROVIDER - NSDCCPTREATMENT_GEN_ALL_CORE_FT
PRINCIPAL PROCEDURE  Procedure: Replacement, mitral valve, with CAMRYN, adult  Findings and Treatment: Activities/Restrictions  1.	Do not – drive, lift, pull or push anything over 10 pounds for 8 weeks.  2.	Shower every night and carefully wash wound, pat dry.  Cover is wound is draining with dry sterile dressing. No baths or swimming for two months.   3.	Apply support stockings /ace wraps to legs as soon as you get out of bed in the morning, remove in evening.   4.	Do progressive walking exercises every day, gradually increasing to 30 to 40 minutes/day, five days a week and incentive spirometer 10 times every hour while awake  5.	DO NOT DRIVE OR CONSUME ALCOHOL WHILE TAKING PAIN MEDICATION.  Contact your Physician promptly if:  1.	 Signs of wound infection, such as increasing redness, swelling, pain or drainage from incision.  2.	Progressive shortness of breath or increasing difficulty with breathing when lying down.  3.	Excessive nausea, vomiting, diarrhea or coughing.  4.	Increase swelling of legs that does not resolve with leg elevation.  5.	Chest pain that spreads to arms, jaw or back or sudden development of numbness, weakness, difficulty speaking or facial droop – Call 911.  6.	Diabetics who are unable to keep finger stick glucose under 150 for three consecutive readings.  Instructions:  1.	 Keep a daily log for Temperature, pulse, blood pressure, and weight twice a day and Glucose if diabetic with each meal. Call office for Temp > 101, pulse greater than 110 or less than 55, BP first # greater than 160 or less than 100, 3 pound weight gain in 1 day or 5 pounds in 3 days  2.	Hold pillow to chest and grab elbows when you need to cough.

## 2025-05-12 NOTE — BRIEF OPERATIVE NOTE - SECOND ASSIST CATEGORY
Dr. Nation about this patient.  He would like to order 1 unit of platelets to air on the side of caution.  Orders placed.     No Resident Program within this Specialty at this Location

## 2025-05-12 NOTE — DISCHARGE NOTE PROVIDER - NSDCMRMEDTOKEN_GEN_ALL_CORE_FT
aspirin 81 mg oral tablet: orally once a day  aspirin 81 mg po q day:   Famotidine: 40 milligram(s) orally once a day  loratadine po q day:   losartan hctz 50/12.5 mg po q day :   Meloxicam: 15 milligram(s) orally once a day   acetaminophen 325 mg oral tablet: 2 tab(s) orally every 6 hours As needed Mild Pain (1 - 3)  apixaban 5 mg oral tablet: 1 tab(s) orally every 12 hours  aspirin 81 mg oral tablet: orally once a day  atorvastatin 40 mg oral tablet: 1 tab(s) orally once a day (at bedtime)  famotidine 20 mg oral tablet: 1 tab(s) orally 2 times a day  furosemide 20 mg oral tablet: 1 tab(s) orally once a day  Meloxicam: 15 milligram(s) orally once a day  Metoprolol Tartrate 25 mg oral tablet: 0.5 tab(s) orally 2 times a day  oxyCODONE 5 mg oral tablet: 1 tab(s) orally every 6 hours As needed Severe Pain (7 - 10)  polyethylene glycol 3350 oral powder for reconstitution: 17 gram(s) orally once a day  potassium chloride 20 mEq oral tablet, extended release: 1 tab(s) orally once a day   acetaminophen 325 mg oral tablet: 2 tab(s) orally every 6 hours As needed Mild Pain (1 - 3)  apixaban 5 mg oral tablet: 1 tab(s) orally every 12 hours  aspirin 81 mg oral tablet: orally once a day  atorvastatin 40 mg oral tablet: 1 tab(s) orally once a day (at bedtime)  famotidine 20 mg oral tablet: 1 tab(s) orally 2 times a day  furosemide 20 mg oral tablet: 1 tab(s) orally once a day  meloxicam 15 mg oral tablet: 1 tab(s) orally once a day  metoprolol tartrate 25 mg oral tablet: 1 tab(s) orally 2 times a day  oxyCODONE 5 mg oral tablet: 1 tab(s) orally every 6 hours as needed for Severe Pain (7 - 10) MDD: 4  polyethylene glycol 3350 oral powder for reconstitution: 17 gram(s) orally once a day  potassium chloride 20 mEq oral tablet, extended release: 1 tab(s) orally once a day

## 2025-05-12 NOTE — PROGRESS NOTE ADULT - SUBJECTIVE AND OBJECTIVE BOX
CTU Attending Progress Daily Note     12 May 2025 18:29  Procedure: MVR   POD#:0    He has history of Gastric ulcer, h/o smoking, HTN    Benign essential HTN    Hyperlipidemia    Mitral regurgitation      HPI:73 y/o female with pmx of covid/smoking, HTN and elective MVR      Hospital Course:  5/12 Patient arrived to ICU , sedated on ventilator, large A-a gradient, coagulopathy, recevied 2 units rbc, 2 units platelets, cryo x2 and 26 mcg of DDAVP    OBJECTIVE:  ICU Vital Signs Last 24 Hrs  T(C): 36.6 (12 May 2025 18:00), Max: 36.6 (12 May 2025 18:00)  T(F): 97.8 (12 May 2025 18:00), Max: 97.8 (12 May 2025 18:00)  HR: 93 (12 May 2025 18:00) (93 - 94)  BP: --  BP(mean): --  ABP: 100/52 (12 May 2025 18:00) (93/52 - 100/52)  ABP(mean): 70 (12 May 2025 18:00) (70 - 71)  RR: 19 (12 May 2025 18:00) (19 - 19)  SpO2: 98% (12 May 2025 18:00) (98% - 98%)    O2 Parameters below as of 12 May 2025 18:00  Patient On (Oxygen Delivery Method): ventilator    O2 Concentration (%): 100      I&O's Summary    I&O's Detail    Adult Advanced Hemodynamics Last 24 Hrs  CVP(mm Hg): 14 (12 May 2025 18:00) (14 - 14)  CVP(cm H2O): --  CO: --  CI: --  PA: --  PA(mean): --  PCWP: --  SVR: --  SVRI: --  PVR: --  PVRI: --  Mode: AC/ CMV (Assist Control/ Continuous Mandatory Ventilation)  RR (machine): 14  TV (machine): 550  FiO2: 100  PEEP: 5  ITime: 1  MAP: 10  PIP: 23    CAPILLARY BLOOD GLUCOSE      POCT Blood Glucose.: 210 mg/dL (12 May 2025 18:20)    LABS:  ABG - ( 12 May 2025 18:18 )  pH, Arterial: 7.37  pH, Blood: x     /  pCO2: 38    /  pO2: 95    / HCO3: 22    / Base Excess: -3.0  /  SaO2: 99.2                                    8.5    13.89 )-----------( 190      ( 12 May 2025 16:20 )             24.7           PT/INR - ( 12 May 2025 16:20 )   PT: 13.10 sec;   INR: 1.11 ratio         PTT - ( 12 May 2025 16:20 )  PTT:28.9 sec      Home Medications:  aspirin 81 mg oral tablet: orally once a day (12 May 2025 06:55)  aspirin 81 mg po q day:  (12 May 2025 06:55)  Famotidine: 40 milligram(s) orally once a day (12 May 2025 06:55)  loratadine po q day:  (12 May 2025 06:55)  losartan hctz 50/12.5 mg po q day :  (12 May 2025 06:55)  Meloxicam: 15 milligram(s) orally once a day (12 May 2025 06:55)    HOSPITAL MEDICATIONS:  MEDICATIONS  (STANDING):  acetaminophen     Tablet .. 650 milliGRAM(s) Oral every 6 hours  acetaminophen   IVPB .. 1000 milliGRAM(s) IV Intermittent once  ascorbic acid 500 milliGRAM(s) Oral two times a day  ceFAZolin   IVPB 2000 milliGRAM(s) IV Intermittent every 8 hours  chlorhexidine 0.12% Liquid 15 milliLiter(s) Oral Mucosa every 12 hours  chlorhexidine 0.12% Liquid 15 milliLiter(s) Oral Mucosa every 12 hours  chlorhexidine 2% Cloths 1 Application(s) Topical daily  dexMEDEtomidine Infusion 0.2 MICROgram(s)/kG/Hr (4.25 mL/Hr) IV Continuous <Continuous>  dextrose 50% Injectable 50 milliLiter(s) IV Push every 15 minutes  dextrose 50% Injectable 25 milliLiter(s) IV Push every 15 minutes  DOBUTamine Infusion 6 MICROgram(s)/kG/Min (15.3 mL/Hr) IV Continuous <Continuous>  famotidine Injectable 20 milliGRAM(s) IV Push every 12 hours  gabapentin 100 milliGRAM(s) Oral every 8 hours  insulin regular Infusion 5 Unit(s)/Hr (5 mL/Hr) IV Continuous <Continuous>  meperidine     Injectable 25 milliGRAM(s) IV Push once  niCARdipine Infusion 5 mG/Hr (25 mL/Hr) IV Continuous <Continuous>  norepinephrine Infusion 0.05 MICROgram(s)/kG/Min (7.97 mL/Hr) IV Continuous <Continuous>  propofol Infusion 15 MICROgram(s)/kG/Min (7.65 mL/Hr) IV Continuous <Continuous>  sodium chloride 0.9%. 1000 milliLiter(s) (20 mL/Hr) IV Continuous <Continuous>  vancomycin  IVPB 1000 milliGRAM(s) IV Intermittent every 12 hours  vasopressin Infusion 0.04 Unit(s)/Min (6 mL/Hr) IV Continuous <Continuous>    MEDICATIONS  (PRN):  fentaNYL    Injectable 25 MICROGram(s) IV Push every 4 hours PRN Severe Pain (7 - 10)  HYDROmorphone  Injectable 0.5 milliGRAM(s) IV Push every 6 hours PRN Breakthrough Pain  oxyCODONE    IR 5 milliGRAM(s) Oral every 4 hours PRN Moderate Pain (4 - 6)  oxyCODONE    IR 10 milliGRAM(s) Oral every 4 hours PRN Severe Pain (7 - 10)    RADIOLOGY:  Chest X-ray Reviewed    REVIEW OF SYSTEMS:    [ x ] Unable to assess ROS because intubated and sedated    PHYSICAL EXAM:          CONSTITUTIONAL: Well-developed; well-nourished; in no acute distress.   	SKIN: warm, dry  	HEAD: Normocephalic; atraumatic.  	EYES: PERRL, EOM, no conj injection, sclera clear  	ENT: No nasal discharge; airway clear.  	NECK: Supple; non tender.   	CARD: S1, S2 normal; no murmurs, gallops, or rubs. Regular rate and rhythm.  no carotid bruits  	RESP: CTA B/L; good air movement No wheezes, rales or rhonchi.  	ABD: Soft, not tender, not distended,  no rebound or guarding, bowel sounds present  	EXT: Normal ROM.  No clubbing, cyanosis or edema.   warm and well perfused.  pulses palpable  	NEURO: Alert, awake, motor 5/5 R, 5/5 L    Assessment  73 y/o female sp MVR POD 0 currently progressing in ICU monitoring ventilation and improve lung mechanics to improve large A-a gradient    Plan:    Neuro:  Multimodal pain management  Tylenol, Lidoderm patch, gabapentin, opiates as needed  Monitor neuro status in the post op period    CV:  S/P MVR  Monitor perfusion, , lactate, UOP, index and MVO2 if available  Maintain MAP > 65  ASA, statin  Remove mediastinal tubes when able    Resp:  continue ventilation to keep spo2 > 92 and pH b/w 7.35-7.45  Supplemental oxygen to maintain sats > 92%  Continuous pulse oximetry monitoring  IS / Chest PT  Nebulizers as needed    GI:  NPO  PUD ppx per protocol    Renal  High risk for JAGDEEP post surgery  Monitor UOP, lytes, creatinine  Diuretics as needed for negative fluid balance  Keep K > 4, Mg > 2    Endo:  Tight glucose control per CTICU protocol  Insulin gtt as needed  Transition to Lantus / SSI and premeal insulin as needed    Heme:  Acute blood loss anemia post surgery  check fibrinogen  High risk for thrombocytopenia  DVT prophylaxis once bleeding risk post surgery is minimized    ID:  Perioperative prophylactic abx per protocol  Monitor fever curve and WBC count  Remove TLC when no longer indicated      Upon my evaluation, the above patient has a high probabillity of imminent or life threatening deterioration due to a high risk for Shock, Cardiogenic shock, arrythmias, acute blood loss, acute kidney injury and acute pulmonary insufficiency which required my direct attention, intervention, and personal management.  Total time was 55 minutes which includes review of radiology results, ordering, interpreting and reviewing diagnostic studies / lab tests with immediate assessment and treatment, consultant collaboration on findings and treatment options, monitoring for potential decompensation, obtaining history from family, EMT, nursing home staff and/or treating physicians; directing the titration of pressors, oxygen support devices, fluid resucitation, interpretation of cardiac output measurements, interpretation of radiological assessments, interpretation of EKG / rhythm strips, telemetry.  This time did not overlap with the management of other patients or performing separately reportable procedures.      Management of this patient was discussed with the CT surgery attending and mid-level providers.    I ackknowledge the use of copied documentation.  All copy forward documentation is my own and has been reviewed and revised as appropriate.  If no changes were made, it is because that information remains the same. No

## 2025-05-12 NOTE — DISCHARGE NOTE PROVIDER - NSDCFUADDAPPT_GEN_ALL_CORE_FT
You have a follow-up appointment with Dr. Guillaume.     Please follow up with your Primary Care Physician / Cardiologist in  2-4 weeks from discharge.    Patient will be discharged with a [employer] monitor to evaluate for potential conduction or rhythm disturbances post TAVR.    Your Care Navigator Nurse Practitioner will be in touch to see you in your home within a few days from discharge. The Follow Your Heart program can help ensure you understand your medications, discharge instructions and answer any questions you may have at that time.

## 2025-05-12 NOTE — DISCHARGE NOTE PROVIDER - NSFOLLOWUPCLINICS_GEN_ALL_ED_FT
[facility] at [place]  Cardiology  [address], [address]  [place] [address]  Phone: [phone]  Fax: [phone]

## 2025-05-12 NOTE — DISCHARGE NOTE PROVIDER - HOSPITAL COURSE
Ms. TAMIKO MASON 74 year F, ex smoker, presented for evaluation of their severe MR on echo done @ outside facility. PMHx of HTN, DLD. Symptoms include ARORA, fatigue, palpitations. On 05/12/25, she underwent mitral valve replacement. Post-operatively,       Their healthcare team includes the following  Cardio: Emilio Ms. TAMIKO MASON 74 year F, ex smoker, presented for evaluation of their severe MR on echo done @ outside facility. PMHx of HTN, DLD. Symptoms include ARORA, fatigue, palpitations. On 05/12/25, she underwent mitral valve replacement. Post-operative course complicated by new onset 2 to 1 AV block postop requiring pacing and then converted to atrial flutter. Patient underwent Transesophageal echocardiogram and DC cardioversion on 5/19 now in Sinus rhythm and epicardial wires removed on 5/19. 1st degree AVB noted. Patient is stable to be discharged with MCOT vest placed on Patient my electrophysiology.       Their healthcare team includes the following  Cardio: Emilio MENDENHALL Surgery: Aundrea   Electrophysiology: Jeanette   PCP: Merary    Patient will be discharged on an MCOT monitor for 30 days to evaluate for potential rhythm disturbances due to TAVR.

## 2025-05-12 NOTE — BRIEF OPERATIVE NOTE - COMMENTS
I, Dr. Pena, was present as the first assistant for the major duration of the case including the entire duration of cardiopulmonary bypass and cross-clamp, initial attempt to repair the valve and then subsequently to replace the mitral valve and then subsequently for weaning from cardiopulmonary bypass and decannulation.

## 2025-05-12 NOTE — DISCHARGE NOTE PROVIDER - NSDCCPCAREPLAN_GEN_ALL_CORE_FT
PRINCIPAL DISCHARGE DIAGNOSIS  Diagnosis: Mitral valve insufficiency  Assessment and Plan of Treatment:

## 2025-05-13 LAB
ALBUMIN SERPL ELPH-MCNC: 3.8 G/DL — SIGNIFICANT CHANGE UP (ref 3.5–5.2)
ALBUMIN SERPL ELPH-MCNC: 3.9 G/DL — SIGNIFICANT CHANGE UP (ref 3.5–5.2)
ALP SERPL-CCNC: 38 U/L — SIGNIFICANT CHANGE UP (ref 30–115)
ALP SERPL-CCNC: 41 U/L — SIGNIFICANT CHANGE UP (ref 30–115)
ALT FLD-CCNC: 20 U/L — SIGNIFICANT CHANGE UP (ref 0–41)
ALT FLD-CCNC: 24 U/L — SIGNIFICANT CHANGE UP (ref 0–41)
ANION GAP SERPL CALC-SCNC: 10 MMOL/L — SIGNIFICANT CHANGE UP (ref 7–14)
ANION GAP SERPL CALC-SCNC: 12 MMOL/L — SIGNIFICANT CHANGE UP (ref 7–14)
APTT BLD: 27.1 SEC — SIGNIFICANT CHANGE UP (ref 27–39.2)
AST SERPL-CCNC: 49 U/L — HIGH (ref 0–41)
AST SERPL-CCNC: 60 U/L — HIGH (ref 0–41)
BASOPHILS # BLD AUTO: 0.01 K/UL — SIGNIFICANT CHANGE UP (ref 0–0.2)
BASOPHILS NFR BLD AUTO: 0.1 % — SIGNIFICANT CHANGE UP (ref 0–1)
BILIRUB SERPL-MCNC: 0.5 MG/DL — SIGNIFICANT CHANGE UP (ref 0.2–1.2)
BILIRUB SERPL-MCNC: 0.6 MG/DL — SIGNIFICANT CHANGE UP (ref 0.2–1.2)
BUN SERPL-MCNC: 20 MG/DL — SIGNIFICANT CHANGE UP (ref 10–20)
BUN SERPL-MCNC: 24 MG/DL — HIGH (ref 10–20)
CALCIUM SERPL-MCNC: 8.2 MG/DL — LOW (ref 8.4–10.5)
CALCIUM SERPL-MCNC: 8.5 MG/DL — SIGNIFICANT CHANGE UP (ref 8.4–10.5)
CHLORIDE SERPL-SCNC: 109 MMOL/L — SIGNIFICANT CHANGE UP (ref 98–110)
CHLORIDE SERPL-SCNC: 111 MMOL/L — HIGH (ref 98–110)
CO2 SERPL-SCNC: 20 MMOL/L — SIGNIFICANT CHANGE UP (ref 17–32)
CO2 SERPL-SCNC: 22 MMOL/L — SIGNIFICANT CHANGE UP (ref 17–32)
CREAT SERPL-MCNC: 0.8 MG/DL — SIGNIFICANT CHANGE UP (ref 0.7–1.5)
CREAT SERPL-MCNC: 0.9 MG/DL — SIGNIFICANT CHANGE UP (ref 0.7–1.5)
EGFR: 68 ML/MIN/1.73M2 — SIGNIFICANT CHANGE UP
EGFR: 68 ML/MIN/1.73M2 — SIGNIFICANT CHANGE UP
EGFR: 78 ML/MIN/1.73M2 — SIGNIFICANT CHANGE UP
EGFR: 78 ML/MIN/1.73M2 — SIGNIFICANT CHANGE UP
EOSINOPHIL # BLD AUTO: 0 K/UL — SIGNIFICANT CHANGE UP (ref 0–0.7)
EOSINOPHIL NFR BLD AUTO: 0 % — SIGNIFICANT CHANGE UP (ref 0–8)
GAS PNL BLDA: SIGNIFICANT CHANGE UP
GLUCOSE BLDC GLUCOMTR-MCNC: 102 MG/DL — HIGH (ref 70–99)
GLUCOSE BLDC GLUCOMTR-MCNC: 105 MG/DL — HIGH (ref 70–99)
GLUCOSE BLDC GLUCOMTR-MCNC: 109 MG/DL — HIGH (ref 70–99)
GLUCOSE BLDC GLUCOMTR-MCNC: 115 MG/DL — HIGH (ref 70–99)
GLUCOSE BLDC GLUCOMTR-MCNC: 118 MG/DL — HIGH (ref 70–99)
GLUCOSE BLDC GLUCOMTR-MCNC: 121 MG/DL — HIGH (ref 70–99)
GLUCOSE BLDC GLUCOMTR-MCNC: 121 MG/DL — HIGH (ref 70–99)
GLUCOSE BLDC GLUCOMTR-MCNC: 126 MG/DL — HIGH (ref 70–99)
GLUCOSE BLDC GLUCOMTR-MCNC: 129 MG/DL — HIGH (ref 70–99)
GLUCOSE BLDC GLUCOMTR-MCNC: 144 MG/DL — HIGH (ref 70–99)
GLUCOSE BLDC GLUCOMTR-MCNC: 156 MG/DL — HIGH (ref 70–99)
GLUCOSE BLDC GLUCOMTR-MCNC: 158 MG/DL — HIGH (ref 70–99)
GLUCOSE BLDC GLUCOMTR-MCNC: 162 MG/DL — HIGH (ref 70–99)
GLUCOSE SERPL-MCNC: 107 MG/DL — HIGH (ref 70–99)
GLUCOSE SERPL-MCNC: 149 MG/DL — HIGH (ref 70–99)
HCT VFR BLD CALC: 28 % — LOW (ref 37–47)
HCT VFR BLD CALC: 29.4 % — LOW (ref 37–47)
HGB BLD-MCNC: 10.1 G/DL — LOW (ref 12–16)
HGB BLD-MCNC: 9.5 G/DL — LOW (ref 12–16)
IMM GRANULOCYTES NFR BLD AUTO: 0.5 % — HIGH (ref 0.1–0.3)
INR BLD: 1.07 RATIO — SIGNIFICANT CHANGE UP (ref 0.65–1.3)
LYMPHOCYTES # BLD AUTO: 1.32 K/UL — SIGNIFICANT CHANGE UP (ref 1.2–3.4)
LYMPHOCYTES # BLD AUTO: 13.4 % — LOW (ref 20.5–51.1)
MAGNESIUM SERPL-MCNC: 2.5 MG/DL — HIGH (ref 1.8–2.4)
MCHC RBC-ENTMCNC: 28.4 PG — SIGNIFICANT CHANGE UP (ref 27–31)
MCHC RBC-ENTMCNC: 28.4 PG — SIGNIFICANT CHANGE UP (ref 27–31)
MCHC RBC-ENTMCNC: 33.9 G/DL — SIGNIFICANT CHANGE UP (ref 32–37)
MCHC RBC-ENTMCNC: 34.4 G/DL — SIGNIFICANT CHANGE UP (ref 32–37)
MCV RBC AUTO: 82.6 FL — SIGNIFICANT CHANGE UP (ref 81–99)
MCV RBC AUTO: 83.6 FL — SIGNIFICANT CHANGE UP (ref 81–99)
MONOCYTES # BLD AUTO: 0.83 K/UL — HIGH (ref 0.1–0.6)
MONOCYTES NFR BLD AUTO: 8.4 % — SIGNIFICANT CHANGE UP (ref 1.7–9.3)
NEUTROPHILS # BLD AUTO: 7.62 K/UL — HIGH (ref 1.4–6.5)
NEUTROPHILS NFR BLD AUTO: 77.6 % — HIGH (ref 42.2–75.2)
NRBC BLD AUTO-RTO: 0 /100 WBCS — SIGNIFICANT CHANGE UP (ref 0–0)
NRBC BLD AUTO-RTO: 0 /100 WBCS — SIGNIFICANT CHANGE UP (ref 0–0)
PLATELET # BLD AUTO: 158 K/UL — SIGNIFICANT CHANGE UP (ref 130–400)
PLATELET # BLD AUTO: 183 K/UL — SIGNIFICANT CHANGE UP (ref 130–400)
PMV BLD: 10.1 FL — SIGNIFICANT CHANGE UP (ref 7.4–10.4)
PMV BLD: 9.7 FL — SIGNIFICANT CHANGE UP (ref 7.4–10.4)
POTASSIUM SERPL-MCNC: 3.9 MMOL/L — SIGNIFICANT CHANGE UP (ref 3.5–5)
POTASSIUM SERPL-MCNC: 5 MMOL/L — SIGNIFICANT CHANGE UP (ref 3.5–5)
POTASSIUM SERPL-SCNC: 3.9 MMOL/L — SIGNIFICANT CHANGE UP (ref 3.5–5)
POTASSIUM SERPL-SCNC: 5 MMOL/L — SIGNIFICANT CHANGE UP (ref 3.5–5)
PROT SERPL-MCNC: 5 G/DL — LOW (ref 6–8)
PROT SERPL-MCNC: 5.3 G/DL — LOW (ref 6–8)
PROTHROM AB SERPL-ACNC: 12.7 SEC — SIGNIFICANT CHANGE UP (ref 9.95–12.87)
RBC # BLD: 3.35 M/UL — LOW (ref 4.2–5.4)
RBC # BLD: 3.56 M/UL — LOW (ref 4.2–5.4)
RBC # FLD: 15.2 % — HIGH (ref 11.5–14.5)
RBC # FLD: 15.9 % — HIGH (ref 11.5–14.5)
SODIUM SERPL-SCNC: 141 MMOL/L — SIGNIFICANT CHANGE UP (ref 135–146)
SODIUM SERPL-SCNC: 143 MMOL/L — SIGNIFICANT CHANGE UP (ref 135–146)
WBC # BLD: 9.83 K/UL — SIGNIFICANT CHANGE UP (ref 4.8–10.8)
WBC # BLD: 9.89 K/UL — SIGNIFICANT CHANGE UP (ref 4.8–10.8)
WBC # FLD AUTO: 9.83 K/UL — SIGNIFICANT CHANGE UP (ref 4.8–10.8)
WBC # FLD AUTO: 9.89 K/UL — SIGNIFICANT CHANGE UP (ref 4.8–10.8)

## 2025-05-13 PROCEDURE — 93010 ELECTROCARDIOGRAM REPORT: CPT

## 2025-05-13 PROCEDURE — 71045 X-RAY EXAM CHEST 1 VIEW: CPT | Mod: 26

## 2025-05-13 PROCEDURE — 99291 CRITICAL CARE FIRST HOUR: CPT

## 2025-05-13 RX ORDER — FUROSEMIDE 10 MG/ML
40 INJECTION INTRAMUSCULAR; INTRAVENOUS ONCE
Refills: 0 | Status: COMPLETED | OUTPATIENT
Start: 2025-05-13 | End: 2025-05-13

## 2025-05-13 RX ORDER — DEXTROSE 50 % IN WATER 50 %
15 SYRINGE (ML) INTRAVENOUS ONCE
Refills: 0 | Status: DISCONTINUED | OUTPATIENT
Start: 2025-05-13 | End: 2025-05-15

## 2025-05-13 RX ORDER — SODIUM CHLORIDE 9 G/1000ML
1000 INJECTION, SOLUTION INTRAVENOUS
Refills: 0 | Status: DISCONTINUED | OUTPATIENT
Start: 2025-05-13 | End: 2025-05-20

## 2025-05-13 RX ORDER — LOSARTAN POTASSIUM 100 MG/1
50 TABLET, FILM COATED ORAL DAILY
Refills: 0 | Status: DISCONTINUED | OUTPATIENT
Start: 2025-05-13 | End: 2025-05-14

## 2025-05-13 RX ORDER — GLUCAGON 3 MG/1
1 POWDER NASAL ONCE
Refills: 0 | Status: DISCONTINUED | OUTPATIENT
Start: 2025-05-13 | End: 2025-05-20

## 2025-05-13 RX ORDER — INSULIN LISPRO 100 U/ML
INJECTION, SOLUTION INTRAVENOUS; SUBCUTANEOUS AT BEDTIME
Refills: 0 | Status: DISCONTINUED | OUTPATIENT
Start: 2025-05-13 | End: 2025-05-15

## 2025-05-13 RX ORDER — ALBUMIN (HUMAN) 12.5 G/50ML
50 INJECTION, SOLUTION INTRAVENOUS ONCE
Refills: 0 | Status: COMPLETED | OUTPATIENT
Start: 2025-05-13 | End: 2025-05-13

## 2025-05-13 RX ORDER — INSULIN LISPRO 100 U/ML
INJECTION, SOLUTION INTRAVENOUS; SUBCUTANEOUS
Refills: 0 | Status: DISCONTINUED | OUTPATIENT
Start: 2025-05-13 | End: 2025-05-15

## 2025-05-13 RX ORDER — ATORVASTATIN CALCIUM 80 MG/1
40 TABLET, FILM COATED ORAL AT BEDTIME
Refills: 0 | Status: DISCONTINUED | OUTPATIENT
Start: 2025-05-13 | End: 2025-05-20

## 2025-05-13 RX ORDER — ASPIRIN 325 MG
81 TABLET ORAL DAILY
Refills: 0 | Status: DISCONTINUED | OUTPATIENT
Start: 2025-05-13 | End: 2025-05-20

## 2025-05-13 RX ORDER — HEPARIN SODIUM 1000 [USP'U]/ML
5000 INJECTION INTRAVENOUS; SUBCUTANEOUS EVERY 8 HOURS
Refills: 0 | Status: DISCONTINUED | OUTPATIENT
Start: 2025-05-13 | End: 2025-05-19

## 2025-05-13 RX ORDER — LOSARTAN POTASSIUM 100 MG/1
50 TABLET, FILM COATED ORAL DAILY
Refills: 0 | Status: DISCONTINUED | OUTPATIENT
Start: 2025-05-13 | End: 2025-05-13

## 2025-05-13 RX ORDER — AMLODIPINE BESYLATE 10 MG/1
2.5 TABLET ORAL ONCE
Refills: 0 | Status: DISCONTINUED | OUTPATIENT
Start: 2025-05-13 | End: 2025-05-13

## 2025-05-13 RX ORDER — ONDANSETRON HCL/PF 4 MG/2 ML
4 VIAL (ML) INJECTION ONCE
Refills: 0 | Status: COMPLETED | OUTPATIENT
Start: 2025-05-13 | End: 2025-05-13

## 2025-05-13 RX ADMIN — Medication 40 MILLIEQUIVALENT(S): at 11:30

## 2025-05-13 RX ADMIN — Medication 650 MILLIGRAM(S): at 18:05

## 2025-05-13 RX ADMIN — Medication 5 UNIT(S)/HR: at 01:41

## 2025-05-13 RX ADMIN — Medication 250 MILLIGRAM(S): at 17:59

## 2025-05-13 RX ADMIN — IPRATROPIUM BROMIDE AND ALBUTEROL SULFATE 3 MILLILITER(S): .5; 2.5 SOLUTION RESPIRATORY (INHALATION) at 19:17

## 2025-05-13 RX ADMIN — Medication 4 MILLIGRAM(S): at 08:31

## 2025-05-13 RX ADMIN — HEPARIN SODIUM 5000 UNIT(S): 1000 INJECTION INTRAVENOUS; SUBCUTANEOUS at 21:01

## 2025-05-13 RX ADMIN — Medication 81 MILLIGRAM(S): at 11:30

## 2025-05-13 RX ADMIN — OXYCODONE HYDROCHLORIDE 10 MILLIGRAM(S): 30 TABLET ORAL at 19:52

## 2025-05-13 RX ADMIN — Medication 400 MILLIGRAM(S): at 07:40

## 2025-05-13 RX ADMIN — Medication 500 MILLIGRAM(S): at 17:49

## 2025-05-13 RX ADMIN — PROPOFOL 7.65 MICROGRAM(S)/KG/MIN: 10 INJECTION, EMULSION INTRAVENOUS at 01:39

## 2025-05-13 RX ADMIN — Medication 20 MILLIGRAM(S): at 05:02

## 2025-05-13 RX ADMIN — Medication 1000 MILLIGRAM(S): at 08:10

## 2025-05-13 RX ADMIN — HEPARIN SODIUM 5000 UNIT(S): 1000 INJECTION INTRAVENOUS; SUBCUTANEOUS at 14:07

## 2025-05-13 RX ADMIN — Medication 650 MILLIGRAM(S): at 23:38

## 2025-05-13 RX ADMIN — Medication 100 MILLIEQUIVALENT(S): at 06:41

## 2025-05-13 RX ADMIN — GABAPENTIN 100 MILLIGRAM(S): 400 CAPSULE ORAL at 21:00

## 2025-05-13 RX ADMIN — IPRATROPIUM BROMIDE AND ALBUTEROL SULFATE 3 MILLILITER(S): .5; 2.5 SOLUTION RESPIRATORY (INHALATION) at 14:36

## 2025-05-13 RX ADMIN — Medication 100 MILLIGRAM(S): at 05:01

## 2025-05-13 RX ADMIN — Medication 650 MILLIGRAM(S): at 11:30

## 2025-05-13 RX ADMIN — Medication 100 MILLIGRAM(S): at 14:05

## 2025-05-13 RX ADMIN — Medication 100 MILLIGRAM(S): at 21:01

## 2025-05-13 RX ADMIN — ALBUMIN (HUMAN) 100 MILLILITER(S): 12.5 INJECTION, SOLUTION INTRAVENOUS at 11:43

## 2025-05-13 RX ADMIN — ATORVASTATIN CALCIUM 40 MILLIGRAM(S): 80 TABLET, FILM COATED ORAL at 21:01

## 2025-05-13 RX ADMIN — Medication 25 MG/HR: at 01:41

## 2025-05-13 RX ADMIN — DEXMEDETOMIDINE HYDROCHLORIDE IN SODIUM CHLORIDE 21.3 MICROGRAM(S)/KG/HR: 4 INJECTION INTRAVENOUS at 01:00

## 2025-05-13 RX ADMIN — Medication 15 MILLILITER(S): at 05:02

## 2025-05-13 RX ADMIN — INSULIN LISPRO 1: 100 INJECTION, SOLUTION INTRAVENOUS; SUBCUTANEOUS at 18:08

## 2025-05-13 RX ADMIN — Medication 2 TABLET(S): at 21:01

## 2025-05-13 RX ADMIN — Medication 650 MILLIGRAM(S): at 12:00

## 2025-05-13 RX ADMIN — OXYCODONE HYDROCHLORIDE 10 MILLIGRAM(S): 30 TABLET ORAL at 21:59

## 2025-05-13 RX ADMIN — Medication 650 MILLIGRAM(S): at 17:49

## 2025-05-13 RX ADMIN — Medication 250 MILLIGRAM(S): at 05:01

## 2025-05-13 RX ADMIN — GABAPENTIN 100 MILLIGRAM(S): 400 CAPSULE ORAL at 14:05

## 2025-05-13 RX ADMIN — Medication 1 PUFF(S): at 07:50

## 2025-05-13 RX ADMIN — Medication 100 MILLIEQUIVALENT(S): at 07:39

## 2025-05-13 RX ADMIN — LOSARTAN POTASSIUM 50 MILLIGRAM(S): 100 TABLET, FILM COATED ORAL at 18:00

## 2025-05-13 RX ADMIN — Medication 1 APPLICATION(S): at 11:29

## 2025-05-13 RX ADMIN — FUROSEMIDE 40 MILLIGRAM(S): 10 INJECTION INTRAMUSCULAR; INTRAVENOUS at 05:02

## 2025-05-13 RX ADMIN — POLYETHYLENE GLYCOL 3350 17 GRAM(S): 17 POWDER, FOR SOLUTION ORAL at 11:30

## 2025-05-13 RX ADMIN — Medication 1 PUFF(S): at 02:11

## 2025-05-13 RX ADMIN — Medication 20 MILLIGRAM(S): at 17:49

## 2025-05-13 NOTE — PHYSICAL THERAPY INITIAL EVALUATION ADULT - GENERAL OBSERVATIONS, REHAB EVAL
1500 - 1530 Pt rec semifowler in bed with +tele, +A line, +howard, +NC 3 L/m, in NAD with family and RN Josseline at b/s. Pt and family requested daughter in room interpret for Polish, declined use of  services.

## 2025-05-13 NOTE — PHYSICAL THERAPY INITIAL EVALUATION ADULT - GAIT DEVIATIONS NOTED, PT EVAL
Slow moving, forward flexed posture, mild SOB that resolved with rest, Narrow base of support, decreased step length/height./decreased shawna/decreased step length/decreased stride length/decreased weight-shifting ability

## 2025-05-13 NOTE — PATIENT PROFILE ADULT - FALL HARM RISK - HARM RISK INTERVENTIONS

## 2025-05-13 NOTE — PROGRESS NOTE ADULT - SUBJECTIVE AND OBJECTIVE BOX
OPERATIVE PROCEDURE(s):                POD #                       SURGEON(s): KALPANA Guillaume    HPI: Ms. TAMIKO MASON 74 year F, ex smoker, presented for evaluation of their severe MR on echo done @ outside facility. PMHx of HTN, DLD. Symptoms include ARORA, fatigue, palpitations. On 05/12/25, she underwent mitral valve replacement. Post op course complicated by bleeding requiring 2 units rbc, 2 units platelets, cryo x2 and 26 mcg of DDAVP. Extubated today at 7am.     SUBJECTIVE ASSESSMENT:74yFemale patient seen and examined at bedside.    Vital Signs Last 24 Hrs  T(F): 98.8 (13 May 2025 08:00), Max: 99.9 (12 May 2025 20:00)  HR: 61 (13 May 2025 11:00) (61 - 94)  BP: 77/51 (13 May 2025 11:00) (77/51 - 104/60)  BP(mean): 59 (13 May 2025 11:00) (59 - 75)  ABP: 93/50 (13 May 2025 11:00) (87/45 - 141/43)  ABP(mean): 64 (13 May 2025 11:00)  RR: 20 (13 May 2025 11:00) (7 - 23)  SpO2: 94% (13 May 2025 11:00) (92% - 99%)  CVP(mm Hg): 13 (13 May 2025 11:00)  CVP(cm H2O): --  CO: 4.3 (13 May 2025 07:00)  CI: 2.2 (13 May 2025 07:00)  PA: --  SVR: 1114 (13 May 2025 07:00)  Mode: CPAP with PS  FiO2: 40  PEEP: 5  PS: 7  MAP: 8    I&O's Detail    12 May 2025 07:01  -  13 May 2025 07:00  --------------------------------------------------------  IN:    Cryoprecipitate: 221 mL    Dexmedetomidine: 85.2 mL    Dexmedetomidine: 149.9 mL    DOBUTamine: 5.2 mL    DOBUTamine: 31.2 mL    Insulin: 24 mL    IV PiggyBack: 100 mL    IV PiggyBack: 250 mL    IV PiggyBack: 100 mL    NiCARdipine: 145 mL    Propofol: 15.4 mL    Propofol: 51.4 mL    sodium chloride 0.9%: 280 mL  Total IN: 1458.3 mL    OUT:    Chest Tube (mL): 430 mL    Indwelling Catheter - Urethral (mL): 1260 mL  Total OUT: 1690 mL        Net: I&O's Detail    12 May 2025 07:01  -  13 May 2025 07:00  --------------------------------------------------------  Total NET: -231.7 mL        CAPILLARY BLOOD GLUCOSE      POCT Blood Glucose.: 118 mg/dL (13 May 2025 09:01)  POCT Blood Glucose.: 126 mg/dL (13 May 2025 08:17)  POCT Blood Glucose.: 129 mg/dL (13 May 2025 06:46)  POCT Blood Glucose.: 144 mg/dL (13 May 2025 05:48)  POCT Blood Glucose.: 121 mg/dL (13 May 2025 04:52)  POCT Blood Glucose.: 121 mg/dL (13 May 2025 03:54)  POCT Blood Glucose.: 102 mg/dL (13 May 2025 02:53)  POCT Blood Glucose.: 115 mg/dL (13 May 2025 01:57)  POCT Blood Glucose.: 125 mg/dL (12 May 2025 23:49)  POCT Blood Glucose.: 137 mg/dL (12 May 2025 22:51)  POCT Blood Glucose.: 149 mg/dL (12 May 2025 21:55)  POCT Blood Glucose.: 51 mg/dL (12 May 2025 21:54)  POCT Blood Glucose.: 102 mg/dL (12 May 2025 20:46)  POCT Blood Glucose.: 144 mg/dL (12 May 2025 19:33)  POCT Blood Glucose.: 210 mg/dL (12 May 2025 18:20)    A1C with Estimated Average Glucose Result: 5.8 % (04-30-25 @ 10:34)      Physical Exam:  General: NAD; A&Ox3  Cardiac: S1/S2, RRR, no murmur, no rubs  Lungs: unlabored respirations, CTA b/l, no wheeze, no rales, no crackles  Abdomen: Soft/NT/ND; positive bowel sounds x 4  Sternum: Intact, no click, incision healing well with no drainage  Incisions: Incisions clean/dry/intact  Extremities: No edema b/l lower extremities; good capillary refill; no cyanosis; palpable 1+ pedal pulses b/l    Central Venous Catheter: Yes: critical illness, intravenous access  Day #1  Hunter Catheter: Yes: critical illness; monitor strict i/o's                    Day # 1  EPICARDIAL WIRES:  YES                                                                         Day #1  BOWEL MOVEMENT:  [] YES [x] NO, If No, Timing since last BM Day #   CHEST TUBE(MS/Left/Right):  [y] YE - S [] NO, If yes -  AIR LEAKS: NO        LABS:                        10.1[L]  9.83  )-----------( 183      ( 13 May 2025 02:00 )             29.4[L]                        9.8[L]  12.74[H] )-----------( 171      ( 12 May 2025 18:10 )             28.4[L]    05-13    143  |  109  |  20  ----------------------------<  107[H]  3.9   |  22  |  0.8  05-12    142  |  110  |  20  ----------------------------<  190[H]  4.6   |  21  |  0.9    Ca    8.2[L]      13 May 2025 02:00  Mg     2.5     05-13    TPro  5.0[L] [6.0 - 8.0]  /  Alb  3.9 [3.5 - 5.2]  /  TBili  0.6 [0.2 - 1.2]  /  DBili  x   /  AST  60[H] [0 - 41]  /  ALT  24 [0 - 41]  /  AlkPhos  38 [30 - 115]  05-13    PT/INR - ( 13 May 2025 02:00 )   PT: ;   INR: 1.07 ratio         PT/INR - ( 12 May 2025 18:10 )   PT: ;   INR: 1.09 ratio         PTT - ( 13 May 2025 02:00 )  PTT:27.1 sec, PTT - ( 12 May 2025 18:10 )  PTT:35.2 sec  Urinalysis Basic - ( 13 May 2025 02:00 )    Color: x / Appearance: x / SG: x / pH: x  Gluc: 107 mg/dL / Ketone: x  / Bili: x / Urobili: x   Blood: x / Protein: x / Nitrite: x   Leuk Esterase: x / RBC: x / WBC x   Sq Epi: x / Non Sq Epi: x / Bacteria: x      ABG - ( 13 May 2025 09:06 )  pH: 7.43  /  pCO2: 35    /  pO2: 79    / HCO3: 23    / Base Excess: -0.8  /  SaO2: 98.7  /  LA: 1.1              RADIOLOGY & ADDITIONAL TESTS:  CXR:    < from: Xray Chest 1 View-PORTABLE IMMEDIATE (Xray Chest 1 View-PORTABLE IMMEDIATE .) (05.12.25 @ 18:41) >  PROCEDURE DATE:  05/12/2025      INTERPRETATION:  CLINICAL HISTORY: Shortness of breath.    COMPARISON: April 30, 2025.    TECHNIQUE: Portable frontal chest radiograph.    FINDINGS:    Support devices: Enteric catheter extends below the hemidiaphragm. The   patient's intubated. Telemetry leads are seen.    Cardiac/mediastinum/hilum: Cardiomegaly. Status post median sternotomy   and valve repair.    Lung parenchyma/Pleura: Retrocardiac opacification and effusion.    Skeleton/soft tissues: Stable.      IMPRESSION:    Cardiomegaly. Retrocardiac opacification and effusion. Support devices as   described.    < end of copied text >      EKG:    < from: 12 Lead ECG (05.13.25 @ 08:43) >  Ventricular Rate 69 BPM    Atrial Rate 69 BPM    P-R Interval 274 ms    QRS Duration 90 ms    Q-T Interval 432 ms    QTC Calculation(Bazett) 462 ms    P Axis 34 degrees    R Axis 40 degrees    T Axis 51 degrees    Diagnosis Line Sinus rhythm with 1st degree A-V block  Otherwise normal ECG    Confirmed by Christofer Kinney ([id number]) on 5/13/2025 11:12:42 AM    < end of copied text >      Allergies    Allergy Status Unknown    Intolerances      MEDICATIONS  (STANDING):  acetaminophen     Tablet .. 650 milliGRAM(s) Oral every 6 hours  albumin human 25% IVPB 50 milliLiter(s) IV Intermittent once  albuterol/ipratropium for Nebulization 3 milliLiter(s) Nebulizer every 6 hours  ascorbic acid 500 milliGRAM(s) Oral two times a day  aspirin enteric coated 81 milliGRAM(s) Oral daily  ceFAZolin   IVPB 2000 milliGRAM(s) IV Intermittent every 8 hours  chlorhexidine 2% Cloths 1 Application(s) Topical daily  dextrose 50% Injectable 50 milliLiter(s) IV Push every 15 minutes  dextrose 50% Injectable 25 milliLiter(s) IV Push every 15 minutes  famotidine    Tablet 20 milliGRAM(s) Oral two times a day  gabapentin 100 milliGRAM(s) Oral every 8 hours  heparin   Injectable 5000 Unit(s) SubCutaneous every 8 hours  insulin regular Infusion 5 Unit(s)/Hr (5 mL/Hr) IV Continuous <Continuous>  meperidine     Injectable 25 milliGRAM(s) IV Push once  ondansetron Injectable 4 milliGRAM(s) IV Push once  polyethylene glycol 3350 17 Gram(s) Oral daily  potassium chloride   Powder 40 milliEquivalent(s) Oral once  senna 2 Tablet(s) Oral at bedtime  sodium chloride 0.9%. 1000 milliLiter(s) (20 mL/Hr) IV Continuous <Continuous>  vancomycin  IVPB 1000 milliGRAM(s) IV Intermittent every 12 hours    MEDICATIONS  (PRN):  fentaNYL    Injectable 25 MICROGram(s) IV Push every 4 hours PRN Severe Pain (7 - 10)  HYDROmorphone  Injectable 0.5 milliGRAM(s) IV Push every 6 hours PRN Breakthrough Pain  oxyCODONE    IR 5 milliGRAM(s) Oral every 4 hours PRN Moderate Pain (4 - 6)  oxyCODONE    IR 10 milliGRAM(s) Oral every 4 hours PRN Severe Pain (7 - 10)    Home Medications:  aspirin 81 mg oral tablet: orally once a day (12 May 2025 06:55)  aspirin 81 mg po q day:  (12 May 2025 06:55)  Famotidine: 40 milligram(s) orally once a day (12 May 2025 06:55)  loratadine po q day:  (12 May 2025 06:55)  losartan hctz 50/12.5 mg po q day :  (12 May 2025 06:55)  Meloxicam: 15 milligram(s) orally once a day (12 May 2025 06:55)      Pharmacologic DVT Prophylaxis [] YES, [] NO: Contraindication:  SCD's: YES b/l    GI Prophylaxis: Pepcid IV push  Post-Op Beta-Blockers: [] Yes, [x] No: contraindication: Hypotensive post op, will restart once Bp is stable  Post-Op CCB: [] Yes, [X] No: contraindication: Hypotensive post op, will restart once Bp is stable  Post-Op Aspirin:  [X] Yes, [] No: contraindication:  Post-Op Statin:  [X] Yes, [] No: contraindication:    Ambulation/Activity Status: Bedrest for 4 hoursafter removal of fem sheath, OOB to chair once stable.     Assessment/Plan:  74y Female status-post  - Case and plan discussed with CTU Intensivist and CT Surgeon - Dr. Guillaume  - Continue CTU supportive care and ongoing plan of care as per continuing CTU rounds.   - Continue DVT/GI prophylaxis  - Incentive Spirometry 10 times an hour  - Continue to advance physical activity as tolerated and continue PT/OT as directed  1. CAD s/p CABG: Continue ASA, statin, BB  2. HTN:   3. Post-op A. Fib ppx:   4. COPD/Hypoxia:   5. DM/Glucose Control:     Social Service Disposition:  Will assess at later time.    OPERATIVE PROCEDURE(s):                POD #                       SURGEON(s): KALPANA Guillaume    HPI: Ms. TAMIKO MASON 74 year F, ex smoker, presented for evaluation of their severe MR on echo done @ outside facility. PMHx of HTN, DLD. Symptoms include ARORA, fatigue, palpitations. On 05/12/25, she underwent mitral valve replacement. Post op course complicated by bleeding requiring 2 units rbc, 2 units platelets, cryo x2 and 26 mcg of DDAVP. Extubated today at 7am.     SUBJECTIVE ASSESSMENT:74yFemale patient seen and examined at bedside.    Vital Signs Last 24 Hrs  T(F): 98.8 (13 May 2025 08:00), Max: 99.9 (12 May 2025 20:00)  HR: 61 (13 May 2025 11:00) (61 - 94)  BP: 77/51 (13 May 2025 11:00) (77/51 - 104/60)  BP(mean): 59 (13 May 2025 11:00) (59 - 75)  ABP: 93/50 (13 May 2025 11:00) (87/45 - 141/43)  ABP(mean): 64 (13 May 2025 11:00)  RR: 20 (13 May 2025 11:00) (7 - 23)  SpO2: 94% (13 May 2025 11:00) (92% - 99%)  CVP(mm Hg): 13 (13 May 2025 11:00)  CVP(cm H2O): --  CO: 4.3 (13 May 2025 07:00)  CI: 2.2 (13 May 2025 07:00)  PA: --  SVR: 1114 (13 May 2025 07:00)  Mode: CPAP with PS  FiO2: 40  PEEP: 5  PS: 7  MAP: 8    I&O's Detail    12 May 2025 07:01  -  13 May 2025 07:00  --------------------------------------------------------  IN:    Cryoprecipitate: 221 mL    Dexmedetomidine: 85.2 mL    Dexmedetomidine: 149.9 mL    DOBUTamine: 5.2 mL    DOBUTamine: 31.2 mL    Insulin: 24 mL    IV PiggyBack: 100 mL    IV PiggyBack: 250 mL    IV PiggyBack: 100 mL    NiCARdipine: 145 mL    Propofol: 15.4 mL    Propofol: 51.4 mL    sodium chloride 0.9%: 280 mL  Total IN: 1458.3 mL    OUT:    Chest Tube (mL): 430 mL    Indwelling Catheter - Urethral (mL): 1260 mL  Total OUT: 1690 mL        Net: I&O's Detail    12 May 2025 07:01  -  13 May 2025 07:00  --------------------------------------------------------  Total NET: -231.7 mL        CAPILLARY BLOOD GLUCOSE      POCT Blood Glucose.: 118 mg/dL (13 May 2025 09:01)  POCT Blood Glucose.: 126 mg/dL (13 May 2025 08:17)  POCT Blood Glucose.: 129 mg/dL (13 May 2025 06:46)  POCT Blood Glucose.: 144 mg/dL (13 May 2025 05:48)  POCT Blood Glucose.: 121 mg/dL (13 May 2025 04:52)  POCT Blood Glucose.: 121 mg/dL (13 May 2025 03:54)  POCT Blood Glucose.: 102 mg/dL (13 May 2025 02:53)  POCT Blood Glucose.: 115 mg/dL (13 May 2025 01:57)  POCT Blood Glucose.: 125 mg/dL (12 May 2025 23:49)  POCT Blood Glucose.: 137 mg/dL (12 May 2025 22:51)  POCT Blood Glucose.: 149 mg/dL (12 May 2025 21:55)  POCT Blood Glucose.: 51 mg/dL (12 May 2025 21:54)  POCT Blood Glucose.: 102 mg/dL (12 May 2025 20:46)  POCT Blood Glucose.: 144 mg/dL (12 May 2025 19:33)  POCT Blood Glucose.: 210 mg/dL (12 May 2025 18:20)    A1C with Estimated Average Glucose Result: 5.8 % (04-30-25 @ 10:34)      Physical Exam:  General: NAD; A&Ox3  Cardiac: S1/S2, RRR, no murmur, no rubs  Lungs: unlabored respirations, CTA b/l, no wheeze, no rales, no crackles  Abdomen: Soft/NT/ND; positive bowel sounds x 4  Sternum: Intact, no click, incision healing well with no drainage  Incisions: Incisions clean/dry/intact  Extremities: No edema b/l lower extremities; good capillary refill; no cyanosis; palpable 1+ pedal pulses b/l    Central Venous Catheter: Yes: critical illness, intravenous access  Day #1  Hunter Catheter: Yes: critical illness; monitor strict i/o's                    Day # 1  EPICARDIAL WIRES:  YES                                                                         Day #1  BOWEL MOVEMENT:  [] YES [x] NO, If No, Timing since last BM Day #   CHEST TUBE(MS/Left/Right):  [y] YE - S [] NO, If yes -  AIR LEAKS: NO        LABS:                        10.1[L]  9.83  )-----------( 183      ( 13 May 2025 02:00 )             29.4[L]                        9.8[L]  12.74[H] )-----------( 171      ( 12 May 2025 18:10 )             28.4[L]    05-13    143  |  109  |  20  ----------------------------<  107[H]  3.9   |  22  |  0.8  05-12    142  |  110  |  20  ----------------------------<  190[H]  4.6   |  21  |  0.9    Ca    8.2[L]      13 May 2025 02:00  Mg     2.5     05-13    TPro  5.0[L] [6.0 - 8.0]  /  Alb  3.9 [3.5 - 5.2]  /  TBili  0.6 [0.2 - 1.2]  /  DBili  x   /  AST  60[H] [0 - 41]  /  ALT  24 [0 - 41]  /  AlkPhos  38 [30 - 115]  05-13    PT/INR - ( 13 May 2025 02:00 )   PT: ;   INR: 1.07 ratio         PT/INR - ( 12 May 2025 18:10 )   PT: ;   INR: 1.09 ratio         PTT - ( 13 May 2025 02:00 )  PTT:27.1 sec, PTT - ( 12 May 2025 18:10 )  PTT:35.2 sec  Urinalysis Basic - ( 13 May 2025 02:00 )    Color: x / Appearance: x / SG: x / pH: x  Gluc: 107 mg/dL / Ketone: x  / Bili: x / Urobili: x   Blood: x / Protein: x / Nitrite: x   Leuk Esterase: x / RBC: x / WBC x   Sq Epi: x / Non Sq Epi: x / Bacteria: x      ABG - ( 13 May 2025 09:06 )  pH: 7.43  /  pCO2: 35    /  pO2: 79    / HCO3: 23    / Base Excess: -0.8  /  SaO2: 98.7  /  LA: 1.1              RADIOLOGY & ADDITIONAL TESTS:  CXR:    < from: Xray Chest 1 View-PORTABLE IMMEDIATE (Xray Chest 1 View-PORTABLE IMMEDIATE .) (05.12.25 @ 18:41) >  PROCEDURE DATE:  05/12/2025      INTERPRETATION:  CLINICAL HISTORY: Shortness of breath.    COMPARISON: April 30, 2025.    TECHNIQUE: Portable frontal chest radiograph.    FINDINGS:    Support devices: Enteric catheter extends below the hemidiaphragm. The   patient's intubated. Telemetry leads are seen.    Cardiac/mediastinum/hilum: Cardiomegaly. Status post median sternotomy   and valve repair.    Lung parenchyma/Pleura: Retrocardiac opacification and effusion.    Skeleton/soft tissues: Stable.      IMPRESSION:    Cardiomegaly. Retrocardiac opacification and effusion. Support devices as   described.    < end of copied text >      EKG:    < from: 12 Lead ECG (05.13.25 @ 08:43) >  Ventricular Rate 69 BPM    Atrial Rate 69 BPM    P-R Interval 274 ms    QRS Duration 90 ms    Q-T Interval 432 ms    QTC Calculation(Bazett) 462 ms    P Axis 34 degrees    R Axis 40 degrees    T Axis 51 degrees    Diagnosis Line Sinus rhythm with 1st degree A-V block  Otherwise normal ECG    Confirmed by Christofer Kinney ([id number]) on 5/13/2025 11:12:42 AM    < end of copied text >      Allergies    Allergy Status Unknown    Intolerances      MEDICATIONS  (STANDING):  acetaminophen     Tablet .. 650 milliGRAM(s) Oral every 6 hours  albumin human 25% IVPB 50 milliLiter(s) IV Intermittent once  albuterol/ipratropium for Nebulization 3 milliLiter(s) Nebulizer every 6 hours  ascorbic acid 500 milliGRAM(s) Oral two times a day  aspirin enteric coated 81 milliGRAM(s) Oral daily  ceFAZolin   IVPB 2000 milliGRAM(s) IV Intermittent every 8 hours  chlorhexidine 2% Cloths 1 Application(s) Topical daily  dextrose 50% Injectable 50 milliLiter(s) IV Push every 15 minutes  dextrose 50% Injectable 25 milliLiter(s) IV Push every 15 minutes  famotidine    Tablet 20 milliGRAM(s) Oral two times a day  gabapentin 100 milliGRAM(s) Oral every 8 hours  heparin   Injectable 5000 Unit(s) SubCutaneous every 8 hours  insulin regular Infusion 5 Unit(s)/Hr (5 mL/Hr) IV Continuous <Continuous>  meperidine     Injectable 25 milliGRAM(s) IV Push once  ondansetron Injectable 4 milliGRAM(s) IV Push once  polyethylene glycol 3350 17 Gram(s) Oral daily  potassium chloride   Powder 40 milliEquivalent(s) Oral once  senna 2 Tablet(s) Oral at bedtime  sodium chloride 0.9%. 1000 milliLiter(s) (20 mL/Hr) IV Continuous <Continuous>  vancomycin  IVPB 1000 milliGRAM(s) IV Intermittent every 12 hours    MEDICATIONS  (PRN):  fentaNYL    Injectable 25 MICROGram(s) IV Push every 4 hours PRN Severe Pain (7 - 10)  HYDROmorphone  Injectable 0.5 milliGRAM(s) IV Push every 6 hours PRN Breakthrough Pain  oxyCODONE    IR 5 milliGRAM(s) Oral every 4 hours PRN Moderate Pain (4 - 6)  oxyCODONE    IR 10 milliGRAM(s) Oral every 4 hours PRN Severe Pain (7 - 10)    Home Medications:  aspirin 81 mg oral tablet: orally once a day (12 May 2025 06:55)  aspirin 81 mg po q day:  (12 May 2025 06:55)  Famotidine: 40 milligram(s) orally once a day (12 May 2025 06:55)  loratadine po q day:  (12 May 2025 06:55)  losartan hctz 50/12.5 mg po q day :  (12 May 2025 06:55)  Meloxicam: 15 milligram(s) orally once a day (12 May 2025 06:55)      Pharmacologic DVT Prophylaxis [] YES, [] NO: Contraindication:  SCD's: YES b/l    GI Prophylaxis: Pepcid IV push  Post-Op Beta-Blockers: [] Yes, [x] No: contraindication: Hypotensive post op, will restart once Bp is stable  Post-Op CCB: [] Yes, [X] No: contraindication: Hypotensive post op, will restart once Bp is stable  Post-Op Aspirin:  [X] Yes, [] No: contraindication:  Post-Op Statin:  [X] Yes, [] No: contraindication:    Ambulation/Activity Status: Bedrest for 4 hoursafter removal of fem sheath, OOB to chair once stable.     Assessment/Plan:  74y Female status-post  - Case and plan discussed with CTU Intensivist and CT Surgeon - Dr. Guillaume  - Continue CTU supportive care and ongoing plan of care as per continuing CTU rounds.   - Continue DVT/GI prophylaxis  - Incentive Spirometry 10 times an hour  - Continue to advance physical activity as tolerated and continue PT/OT as directed  1. CAD s/p CABG: Continue ASA, statin, BB  2. HTN: Holding anti-HTN now  3. Post-op A. Fib ppx: No Afib at this time  4. COPD/Hypoxia: NCL  5. DM/Glucose Control: Insulin gtt, will transition to [facility]    Social Service Disposition:  Will assess at later time.

## 2025-05-13 NOTE — PHYSICAL THERAPY INITIAL EVALUATION ADULT - IMPAIRMENTS FOUND, PT EVAL
3-4 yrs
aerobic capacity/endurance/ergonomics and body mechanics/gait, locomotion, and balance/muscle strength

## 2025-05-13 NOTE — PHYSICAL THERAPY INITIAL EVALUATION ADULT - GAIT TRAINING, PT EVAL
by discharge: Pt will amb 200 ft independently using RW as needed and negotiate 10 steps with supervision using handrail.

## 2025-05-13 NOTE — PHYSICAL THERAPY INITIAL EVALUATION ADULT - ADDITIONAL COMMENTS
Pt lives with family in pvt home, reports amb independently using SC/RW prior to admission, distances limited by SOB.

## 2025-05-13 NOTE — PROGRESS NOTE ADULT - SUBJECTIVE AND OBJECTIVE BOX
CTU Attending Progress Daily Note     13 May 2025 18:29  Procedure: MVR   POD#:1    He has history of Gastric ulcer, h/o smoking, HTN    Benign essential HTN    Hyperlipidemia    Mitral regurgitation      HPI:73 y/o female with pmx of covid/smoking, HTN and elective MVR      Hospital Course:  5/12 Patient arrived to ICU , sedated on ventilator, large A-a gradient, coagulopathy, recevied 2 units rbc, 2 units platelets, cryo x2 and 26 mcg of DDAVP    OBJECTIVE:  ICU Vital Signs Last 24 Hrs  T(C): 36.6 (12 May 2025 18:00), Max: 36.6 (12 May 2025 18:00)  T(F): 97.8 (12 May 2025 18:00), Max: 97.8 (12 May 2025 18:00)  HR: 93 (12 May 2025 18:00) (93 - 94)  BP: --  BP(mean): --  ABP: 100/52 (12 May 2025 18:00) (93/52 - 100/52)  ABP(mean): 70 (12 May 2025 18:00) (70 - 71)  RR: 19 (12 May 2025 18:00) (19 - 19)  SpO2: 98% (12 May 2025 18:00) (98% - 98%)    O2 Parameters below as of 12 May 2025 18:00  Patient On (Oxygen Delivery Method): ventilator    O2 Concentration (%): 100      I&O's Summary    I&O's Detail    Adult Advanced Hemodynamics Last 24 Hrs  CVP(mm Hg): 14 (12 May 2025 18:00) (14 - 14)  CVP(cm H2O): --  CO: --  CI: --  PA: --  PA(mean): --  PCWP: --  SVR: --  SVRI: --  PVR: --  PVRI: --  Mode: AC/ CMV (Assist Control/ Continuous Mandatory Ventilation)  RR (machine): 14  TV (machine): 550  FiO2: 100  PEEP: 5  ITime: 1  MAP: 10  PIP: 23    CAPILLARY BLOOD GLUCOSE      POCT Blood Glucose.: 210 mg/dL (12 May 2025 18:20)    LABS:  ABG - ( 12 May 2025 18:18 )  pH, Arterial: 7.37  pH, Blood: x     /  pCO2: 38    /  pO2: 95    / HCO3: 22    / Base Excess: -3.0  /  SaO2: 99.2                                    8.5    13.89 )-----------( 190      ( 12 May 2025 16:20 )             24.7           PT/INR - ( 12 May 2025 16:20 )   PT: 13.10 sec;   INR: 1.11 ratio         PTT - ( 12 May 2025 16:20 )  PTT:28.9 sec      Home Medications:  aspirin 81 mg oral tablet: orally once a day (12 May 2025 06:55)  aspirin 81 mg po q day:  (12 May 2025 06:55)  Famotidine: 40 milligram(s) orally once a day (12 May 2025 06:55)  loratadine po q day:  (12 May 2025 06:55)  losartan hctz 50/12.5 mg po q day :  (12 May 2025 06:55)  Meloxicam: 15 milligram(s) orally once a day (12 May 2025 06:55)    HOSPITAL MEDICATIONS:  MEDICATIONS  (STANDING):  acetaminophen     Tablet .. 650 milliGRAM(s) Oral every 6 hours  acetaminophen   IVPB .. 1000 milliGRAM(s) IV Intermittent once  ascorbic acid 500 milliGRAM(s) Oral two times a day  ceFAZolin   IVPB 2000 milliGRAM(s) IV Intermittent every 8 hours  chlorhexidine 0.12% Liquid 15 milliLiter(s) Oral Mucosa every 12 hours  chlorhexidine 0.12% Liquid 15 milliLiter(s) Oral Mucosa every 12 hours  chlorhexidine 2% Cloths 1 Application(s) Topical daily  dexMEDEtomidine Infusion 0.2 MICROgram(s)/kG/Hr (4.25 mL/Hr) IV Continuous <Continuous>  dextrose 50% Injectable 50 milliLiter(s) IV Push every 15 minutes  dextrose 50% Injectable 25 milliLiter(s) IV Push every 15 minutes  DOBUTamine Infusion 6 MICROgram(s)/kG/Min (15.3 mL/Hr) IV Continuous <Continuous>  famotidine Injectable 20 milliGRAM(s) IV Push every 12 hours  gabapentin 100 milliGRAM(s) Oral every 8 hours  insulin regular Infusion 5 Unit(s)/Hr (5 mL/Hr) IV Continuous <Continuous>  meperidine     Injectable 25 milliGRAM(s) IV Push once  niCARdipine Infusion 5 mG/Hr (25 mL/Hr) IV Continuous <Continuous>  norepinephrine Infusion 0.05 MICROgram(s)/kG/Min (7.97 mL/Hr) IV Continuous <Continuous>  propofol Infusion 15 MICROgram(s)/kG/Min (7.65 mL/Hr) IV Continuous <Continuous>  sodium chloride 0.9%. 1000 milliLiter(s) (20 mL/Hr) IV Continuous <Continuous>  vancomycin  IVPB 1000 milliGRAM(s) IV Intermittent every 12 hours  vasopressin Infusion 0.04 Unit(s)/Min (6 mL/Hr) IV Continuous <Continuous>    MEDICATIONS  (PRN):  fentaNYL    Injectable 25 MICROGram(s) IV Push every 4 hours PRN Severe Pain (7 - 10)  HYDROmorphone  Injectable 0.5 milliGRAM(s) IV Push every 6 hours PRN Breakthrough Pain  oxyCODONE    IR 5 milliGRAM(s) Oral every 4 hours PRN Moderate Pain (4 - 6)  oxyCODONE    IR 10 milliGRAM(s) Oral every 4 hours PRN Severe Pain (7 - 10)    RADIOLOGY:  Chest X-ray Reviewed    REVIEW OF SYSTEMS:    [ x ] Unable to assess ROS because intubated and sedated    PHYSICAL EXAM:          CONSTITUTIONAL: Well-developed; well-nourished; in no acute distress.   	SKIN: warm, dry  	HEAD: Normocephalic; atraumatic.  	EYES: PERRL, EOM, no conj injection, sclera clear  	ENT: No nasal discharge; airway clear.  	NECK: Supple; non tender.   	CARD: S1, S2 normal; no murmurs, gallops, or rubs. Regular rate and rhythm.  no carotid bruits  	RESP: CTA B/L; good air movement No wheezes, rales or rhonchi.  	ABD: Soft, not tender, not distended,  no rebound or guarding, bowel sounds present  	EXT: Normal ROM.  No clubbing, cyanosis or edema.   warm and well perfused.  pulses palpable  	NEURO: Alert, awake, motor 5/5 R, 5/5 L    Assessment  73 y/o female sp MVR POD 0 currently progressing in ICU monitoring ventilation and improve lung mechanics to improve large A-a gradient    Plan:    Neuro:  Multimodal pain management  Tylenol, Lidoderm patch, gabapentin, opiates as needed  Monitor neuro status in the post op period    CV:  S/P MVR  Monitor perfusion, , lactate, UOP, index and MVO2 if available  Maintain MAP > 65  ASA, statin  Remove mediastinal tubes when able    Resp:  continue ventilation to keep spo2 > 92 and pH b/w 7.35-7.45  Supplemental oxygen to maintain sats > 92%  Continuous pulse oximetry monitoring  IS / Chest PT  Nebulizers as needed    GI:  NPO  PUD ppx per protocol    Renal  High risk for JAGDEEP post surgery  Monitor UOP, lytes, creatinine  Diuretics as needed for negative fluid balance  Keep K > 4, Mg > 2    Endo:  Tight glucose control per CTICU protocol  Insulin gtt as needed  Transition to Lantus / SSI and premeal insulin as needed    Heme:  Acute blood loss anemia post surgery  check fibrinogen  High risk for thrombocytopenia  DVT prophylaxis once bleeding risk post surgery is minimized    ID:  Perioperative prophylactic abx per protocol  Monitor fever curve and WBC count  Remove TLC when no longer indicated      Upon my evaluation, the above patient has a high probabillity of imminent or life threatening deterioration due to a high risk for Shock, Cardiogenic shock, arrythmias, acute blood loss, acute kidney injury and acute pulmonary insufficiency which required my direct attention, intervention, and personal management.  Total time was 55 minutes which includes review of radiology results, ordering, interpreting and reviewing diagnostic studies / lab tests with immediate assessment and treatment, consultant collaboration on findings and treatment options, monitoring for potential decompensation, obtaining history from family, EMT, nursing home staff and/or treating physicians; directing the titration of pressors, oxygen support devices, fluid resucitation, interpretation of cardiac output measurements, interpretation of radiological assessments, interpretation of EKG / rhythm strips, telemetry.  This time did not overlap with the management of other patients or performing separately reportable procedures.      Management of this patient was discussed with the CT surgery attending and mid-level providers.    I ackknowledge the use of copied documentation.  All copy forward documentation is my own and has been reviewed and revised as appropriate.  If no changes were made, it is because that information remains the same. CTU Attending Progress Daily Note     13 May 2025 18:29  Procedure: MVR   POD#:1    He has history of Gastric ulcer, h/o smoking, HTN    Benign essential HTN    Hyperlipidemia    Mitral regurgitation      HPI:75 y/o female with pmx of covid/smoking, HTN and elective MVR      Hospital Course:  5/12 Patient arrived to ICU , sedated on ventilator, large A-a gradient, coagulopathy, recevied 2 units rbc, 2 units platelets, cryo x2 and 26 mcg of DDAVP  5/13 extubated   OBJECTIVE:  ICU Vital Signs Last 24 Hrs  T(C): 37.1 (13 May 2025 08:00), Max: 37.7 (12 May 2025 20:00)  T(F): 98.8 (13 May 2025 08:00), Max: 99.9 (12 May 2025 20:00)  HR: 61 (13 May 2025 10:00) (61 - 94)  BP: 80/51 (13 May 2025 09:00) (80/51 - 104/60)  BP(mean): 61 (13 May 2025 09:00) (61 - 75)  ABP: 94/55 (13 May 2025 10:00) (87/45 - 141/43)  ABP(mean): 69 (13 May 2025 10:00) (60 - 86)  RR: 22 (13 May 2025 10:00) (7 - 23)  SpO2: 92% (13 May 2025 10:00) (92% - 99%)    O2 Parameters below as of 13 May 2025 09:00  Patient On (Oxygen Delivery Method): mask, aerosol    O2 Concentration (%): 40      I&O's Summary    12 May 2025 07:01  -  13 May 2025 07:00  --------------------------------------------------------  IN: 1458.3 mL / OUT: 1690 mL / NET: -231.7 mL    13 May 2025 07:01  -  13 May 2025 11:06  --------------------------------------------------------  IN: 248.6 mL / OUT: 120 mL / NET: 128.6 mL      I&O's Detail    12 May 2025 07:01  -  13 May 2025 07:00  --------------------------------------------------------  IN:    Cryoprecipitate: 221 mL    Dexmedetomidine: 85.2 mL    Dexmedetomidine: 149.9 mL    DOBUTamine: 5.2 mL    DOBUTamine: 31.2 mL    Insulin: 24 mL    IV PiggyBack: 100 mL    IV PiggyBack: 250 mL    IV PiggyBack: 100 mL    NiCARdipine: 145 mL    Propofol: 15.4 mL    Propofol: 51.4 mL    sodium chloride 0.9%: 280 mL  Total IN: 1458.3 mL    OUT:    Chest Tube (mL): 430 mL    Indwelling Catheter - Urethral (mL): 1260 mL  Total OUT: 1690 mL    Total NET: -231.7 mL      13 May 2025 07:01  -  13 May 2025 11:06  --------------------------------------------------------  IN:    DOBUTamine: 2.6 mL    Insulin: 6 mL    IV PiggyBack: 200 mL    sodium chloride 0.9%: 40 mL  Total IN: 248.6 mL    OUT:    Chest Tube (mL): 20 mL    Indwelling Catheter - Urethral (mL): 100 mL  Total OUT: 120 mL    Total NET: 128.6 mL        Adult Advanced Hemodynamics Last 24 Hrs  CVP(mm Hg): 21 (13 May 2025 10:00) (6 - 23)  CVP(cm H2O): --  CO: 4.3 (13 May 2025 07:00) (3.8 - 5.4)  CI: 2.2 (13 May 2025 07:00) (2 - 2.8)  PA: --  PA(mean): --  PCWP: --  SVR: 1114 (13 May 2025 07:00) (828 - 1240)  SVRI: 2179 (13 May 2025 07:00) (7121 - 5829)  PVR: --  PVRI: --  Mode: CPAP with PS  FiO2: 40  PEEP: 5  PS: 7  ITime: 1  MAP: 8  PIP: 13    CAPILLARY BLOOD GLUCOSE      POCT Blood Glucose.: 118 mg/dL (13 May 2025 09:01)  POCT Blood Glucose.: 126 mg/dL (13 May 2025 08:17)  POCT Blood Glucose.: 129 mg/dL (13 May 2025 06:46)  POCT Blood Glucose.: 144 mg/dL (13 May 2025 05:48)  POCT Blood Glucose.: 121 mg/dL (13 May 2025 04:52)  POCT Blood Glucose.: 121 mg/dL (13 May 2025 03:54)  POCT Blood Glucose.: 102 mg/dL (13 May 2025 02:53)  POCT Blood Glucose.: 115 mg/dL (13 May 2025 01:57)  POCT Blood Glucose.: 125 mg/dL (12 May 2025 23:49)  POCT Blood Glucose.: 137 mg/dL (12 May 2025 22:51)  POCT Blood Glucose.: 149 mg/dL (12 May 2025 21:55)  POCT Blood Glucose.: 51 mg/dL (12 May 2025 21:54)  POCT Blood Glucose.: 102 mg/dL (12 May 2025 20:46)  POCT Blood Glucose.: 144 mg/dL (12 May 2025 19:33)  POCT Blood Glucose.: 210 mg/dL (12 May 2025 18:20)    LABS:  ABG - ( 13 May 2025 09:06 )  pH, Arterial: 7.43  pH, Blood: x     /  pCO2: 35    /  pO2: 79    / HCO3: 23    / Base Excess: -0.8  /  SaO2: 98.7                                    10.1   9.83  )-----------( 183      ( 13 May 2025 02:00 )             29.4     05-13    143  |  109  |  20  ----------------------------<  107[H]  3.9   |  22  |  0.8    Ca    8.2[L]      13 May 2025 02:00  Mg     2.5     05-13    TPro  5.0[L]  /  Alb  3.9  /  TBili  0.6  /  DBili  x   /  AST  60[H]  /  ALT  24  /  AlkPhos  38  05-13    PT/INR - ( 13 May 2025 02:00 )   PT: 12.70 sec;   INR: 1.07 ratio         PTT - ( 13 May 2025 02:00 )  PTT:27.1 sec  Urinalysis Basic - ( 13 May 2025 02:00 )    Color: x / Appearance: x / SG: x / pH: x  Gluc: 107 mg/dL / Ketone: x  / Bili: x / Urobili: x   Blood: x / Protein: x / Nitrite: x   Leuk Esterase: x / RBC: x / WBC x   Sq Epi: x / Non Sq Epi: x / Bacteria: x        Home Medications:  aspirin 81 mg oral tablet: orally once a day (12 May 2025 06:55)  aspirin 81 mg po q day:  (12 May 2025 06:55)  Famotidine: 40 milligram(s) orally once a day (12 May 2025 06:55)  loratadine po q day:  (12 May 2025 06:55)  losartan hctz 50/12.5 mg po q day :  (12 May 2025 06:55)  Meloxicam: 15 milligram(s) orally once a day (12 May 2025 06:55)    HOSPITAL MEDICATIONS:  MEDICATIONS  (STANDING):  acetaminophen     Tablet .. 650 milliGRAM(s) Oral every 6 hours  albuterol/ipratropium for Nebulization 3 milliLiter(s) Nebulizer every 6 hours  ascorbic acid 500 milliGRAM(s) Oral two times a day  aspirin enteric coated 81 milliGRAM(s) Oral daily  ceFAZolin   IVPB 2000 milliGRAM(s) IV Intermittent every 8 hours  chlorhexidine 2% Cloths 1 Application(s) Topical daily  dextrose 50% Injectable 50 milliLiter(s) IV Push every 15 minutes  dextrose 50% Injectable 25 milliLiter(s) IV Push every 15 minutes  famotidine    Tablet 20 milliGRAM(s) Oral two times a day  gabapentin 100 milliGRAM(s) Oral every 8 hours  heparin   Injectable 5000 Unit(s) SubCutaneous every 8 hours  insulin regular Infusion 5 Unit(s)/Hr (5 mL/Hr) IV Continuous <Continuous>  meperidine     Injectable 25 milliGRAM(s) IV Push once  polyethylene glycol 3350 17 Gram(s) Oral daily  potassium chloride   Powder 40 milliEquivalent(s) Oral once  senna 2 Tablet(s) Oral at bedtime  sodium chloride 0.9%. 1000 milliLiter(s) (20 mL/Hr) IV Continuous <Continuous>  vancomycin  IVPB 1000 milliGRAM(s) IV Intermittent every 12 hours    MEDICATIONS  (PRN):  fentaNYL    Injectable 25 MICROGram(s) IV Push every 4 hours PRN Severe Pain (7 - 10)  HYDROmorphone  Injectable 0.5 milliGRAM(s) IV Push every 6 hours PRN Breakthrough Pain  oxyCODONE    IR 5 milliGRAM(s) Oral every 4 hours PRN Moderate Pain (4 - 6)  oxyCODONE    IR 10 milliGRAM(s) Oral every 4 hours PRN Severe Pain (7 - 10)    RADIOLOGY:  Chest X-ray Reviewed    REVIEW OF SYSTEMS:  CONSTITUTIONAL: [X] all negative; [ ] weakness, [ ] fevers, [ ] chills  EYES/ENT: [X] all negative; [ ] visual changes, [ ] vertigo, [ ] throat pain   NECK: [X] all negative; [ ] pain, [ ] stiffness  RESPIRATORY: [] all negative, [ ] cough, [ ] wheezing, [ ] hemoptysis, [ ] shortness of breath  CARDIOVASCULAR: [] all negative; [ ] chest pain, [ ] palpitations, [ ] orthopnea  GASTROINTESTINAL: [X] all negative; [ ]abdominal pain, [ ] nausea, [ ] vomiting, [ ] hematemesis, [ ] diarrhea, [ ] constipation, [ ] melena, [ ] hematochezia.  GENITOURINARY: [X] all negative; [ ] dysuria, [ ] frequency, [ ] hematuria  NEUROLOGICAL: [X] all negative; [ ] numbness, [ ] weakness  SKIN: [X] all negative; [ ] itching, [ ] burning, [ ] rashes, [ ] lesions   All other review of systems is negative unless indicated above.  [  ] Unable to assess ROS because     PHYSICAL EXAM:          CONSTITUTIONAL: Well-developed; well-nourished; in no acute distress.   	SKIN: warm, dry  	HEAD: Normocephalic; atraumatic.  	EYES: PERRL, EOM, no conj injection, sclera clear  	ENT: No nasal discharge; airway clear.  	NECK: Supple; non tender.   	CARD: S1, S2 normal; no murmurs, gallops, or rubs. Regular rate and rhythm.  no carotid bruits  	RESP: CTA B/L; good air movement No wheezes, rales or rhonchi.  	ABD: Soft, not tender, not distended,  no rebound or guarding, bowel sounds present  	EXT: Normal ROM.  No clubbing, cyanosis or edema.   warm and well perfused.  pulses palpable  	NEURO: Alert, awake, motor 5/5 R, 5/5 L    Assessment  75 y/o female sp MVR POD 1  i placed her on SBT and extubated in am    Plan:    Neuro:  Multimodal pain management  Tylenol, Lidoderm patch, gabapentin, opiates as needed  Monitor neuro status in the post op period    CV:  S/P MVR  Monitor perfusion, , lactate, UOP, index and MVO2 if available  Maintain MAP > 65  ASA, statin  Remove mediastinal tubes when able keep for today    Resp:  continue ventilation to keep spo2 > 92 and pH b/w 7.35-7.45  Supplemental oxygen to maintain sats > 92%  Continuous pulse oximetry monitoring  IS / Chest PT  Nebulizers as needed    GI:  NPO  PUD ppx per protocol    Renal  High risk for JAGDEEP post surgery  Monitor UOP, lytes, creatinine  Diuretics as needed for negative fluid balance  Keep K > 4, Mg > 2    Endo:  Tight glucose control per [facility]ICU protocol  Insulin gtt as needed  Transition to Lantus / SSI and premeal insulin as needed    Heme:  Acute blood loss anemia post surgery  High risk for thrombocytopenia  DVT prophylaxis hep sq    ID:  Perioperative prophylactic abx per protocol  Monitor fever curve and WBC count  Remove TLC when no longer indicated      Upon my evaluation, the above patient has a high probabillity of imminent or life threatening deterioration due to a high risk for Shock, Cardiogenic shock, arrythmias, acute blood loss, acute kidney injury and acute pulmonary insufficiency which required my direct attention, intervention, and personal management.  Total time was 55 minutes which includes review of radiology results, ordering, interpreting and reviewing diagnostic studies / lab tests with immediate assessment and treatment, consultant collaboration on findings and treatment options, monitoring for potential decompensation, obtaining history from family, EMT, nursing home staff and/or treating physicians; directing the titration of pressors, oxygen support devices, fluid resucitation, interpretation of cardiac output measurements, interpretation of radiological assessments, interpretation of EKG / rhythm strips, telemetry.  This time did not overlap with the management of other patients or performing separately reportable procedures.      Management of this patient was discussed with the CT surgery attending and mid-level providers.    I ackknowledge the use of copied documentation.  All copy forward documentation is my own and has been reviewed and revised as appropriate.  If no changes were made, it is because that information remains the same. CTU Attending Progress Daily Note     13 May 2025 18:29  Procedure: MVR   POD#:1    He has history of Gastric ulcer, h/o smoking, HTN    Benign essential HTN    Hyperlipidemia    Mitral regurgitation      HPI:73 y/o female with pmx of covid/smoking, HTN and elective MVR      Hospital Course:  5/12 Patient arrived to ICU , sedated on ventilator, large A-a gradient, coagulopathy, recevied 2 units rbc, 2 units platelets, cryo x2 and 26 mcg of DDAVP  5/13 extubated   OBJECTIVE:  ICU Vital Signs Last 24 Hrs  T(C): 37.1 (13 May 2025 08:00), Max: 37.7 (12 May 2025 20:00)  T(F): 98.8 (13 May 2025 08:00), Max: 99.9 (12 May 2025 20:00)  HR: 61 (13 May 2025 10:00) (61 - 94)  BP: 80/51 (13 May 2025 09:00) (80/51 - 104/60)  BP(mean): 61 (13 May 2025 09:00) (61 - 75)  ABP: 94/55 (13 May 2025 10:00) (87/45 - 141/43)  ABP(mean): 69 (13 May 2025 10:00) (60 - 86)  RR: 22 (13 May 2025 10:00) (7 - 23)  SpO2: 92% (13 May 2025 10:00) (92% - 99%)    O2 Parameters below as of 13 May 2025 09:00  Patient On (Oxygen Delivery Method): mask, aerosol    O2 Concentration (%): 40      I&O's Summary    12 May 2025 07:01  -  13 May 2025 07:00  --------------------------------------------------------  IN: 1458.3 mL / OUT: 1690 mL / NET: -231.7 mL    13 May 2025 07:01  -  13 May 2025 11:06  --------------------------------------------------------  IN: 248.6 mL / OUT: 120 mL / NET: 128.6 mL      I&O's Detail    12 May 2025 07:01  -  13 May 2025 07:00  --------------------------------------------------------  IN:    Cryoprecipitate: 221 mL    Dexmedetomidine: 85.2 mL    Dexmedetomidine: 149.9 mL    DOBUTamine: 5.2 mL    DOBUTamine: 31.2 mL    Insulin: 24 mL    IV PiggyBack: 100 mL    IV PiggyBack: 250 mL    IV PiggyBack: 100 mL    NiCARdipine: 145 mL    Propofol: 15.4 mL    Propofol: 51.4 mL    sodium chloride 0.9%: 280 mL  Total IN: 1458.3 mL    OUT:    Chest Tube (mL): 430 mL    Indwelling Catheter - Urethral (mL): 1260 mL  Total OUT: 1690 mL    Total NET: -231.7 mL      13 May 2025 07:01  -  13 May 2025 11:06  --------------------------------------------------------  IN:    DOBUTamine: 2.6 mL    Insulin: 6 mL    IV PiggyBack: 200 mL    sodium chloride 0.9%: 40 mL  Total IN: 248.6 mL    OUT:    Chest Tube (mL): 20 mL    Indwelling Catheter - Urethral (mL): 100 mL  Total OUT: 120 mL    Total NET: 128.6 mL        Adult Advanced Hemodynamics Last 24 Hrs  CVP(mm Hg): 21 (13 May 2025 10:00) (6 - 23)  CVP(cm H2O): --  CO: 4.3 (13 May 2025 07:00) (3.8 - 5.4)  CI: 2.2 (13 May 2025 07:00) (2 - 2.8)  PA: --  PA(mean): --  PCWP: --  SVR: 1114 (13 May 2025 07:00) (828 - 1240)  SVRI: 2179 (13 May 2025 07:00) (8897 - 8596)  PVR: --  PVRI: --  Mode: CPAP with PS  FiO2: 40  PEEP: 5  PS: 7  ITime: 1  MAP: 8  PIP: 13    CAPILLARY BLOOD GLUCOSE      POCT Blood Glucose.: 118 mg/dL (13 May 2025 09:01)  POCT Blood Glucose.: 126 mg/dL (13 May 2025 08:17)  POCT Blood Glucose.: 129 mg/dL (13 May 2025 06:46)  POCT Blood Glucose.: 144 mg/dL (13 May 2025 05:48)  POCT Blood Glucose.: 121 mg/dL (13 May 2025 04:52)  POCT Blood Glucose.: 121 mg/dL (13 May 2025 03:54)  POCT Blood Glucose.: 102 mg/dL (13 May 2025 02:53)  POCT Blood Glucose.: 115 mg/dL (13 May 2025 01:57)  POCT Blood Glucose.: 125 mg/dL (12 May 2025 23:49)  POCT Blood Glucose.: 137 mg/dL (12 May 2025 22:51)  POCT Blood Glucose.: 149 mg/dL (12 May 2025 21:55)  POCT Blood Glucose.: 51 mg/dL (12 May 2025 21:54)  POCT Blood Glucose.: 102 mg/dL (12 May 2025 20:46)  POCT Blood Glucose.: 144 mg/dL (12 May 2025 19:33)  POCT Blood Glucose.: 210 mg/dL (12 May 2025 18:20)    LABS:  ABG - ( 13 May 2025 09:06 )  pH, Arterial: 7.43  pH, Blood: x     /  pCO2: 35    /  pO2: 79    / HCO3: 23    / Base Excess: -0.8  /  SaO2: 98.7                                    10.1   9.83  )-----------( 183      ( 13 May 2025 02:00 )             29.4     05-13    143  |  109  |  20  ----------------------------<  107[H]  3.9   |  22  |  0.8    Ca    8.2[L]      13 May 2025 02:00  Mg     2.5     05-13    TPro  5.0[L]  /  Alb  3.9  /  TBili  0.6  /  DBili  x   /  AST  60[H]  /  ALT  24  /  AlkPhos  38  05-13    PT/INR - ( 13 May 2025 02:00 )   PT: 12.70 sec;   INR: 1.07 ratio         PTT - ( 13 May 2025 02:00 )  PTT:27.1 sec  Urinalysis Basic - ( 13 May 2025 02:00 )    Color: x / Appearance: x / SG: x / pH: x  Gluc: 107 mg/dL / Ketone: x  / Bili: x / Urobili: x   Blood: x / Protein: x / Nitrite: x   Leuk Esterase: x / RBC: x / WBC x   Sq Epi: x / Non Sq Epi: x / Bacteria: x        Home Medications:  aspirin 81 mg oral tablet: orally once a day (12 May 2025 06:55)  aspirin 81 mg po q day:  (12 May 2025 06:55)  Famotidine: 40 milligram(s) orally once a day (12 May 2025 06:55)  loratadine po q day:  (12 May 2025 06:55)  losartan hctz 50/12.5 mg po q day :  (12 May 2025 06:55)  Meloxicam: 15 milligram(s) orally once a day (12 May 2025 06:55)    HOSPITAL MEDICATIONS:  MEDICATIONS  (STANDING):  acetaminophen     Tablet .. 650 milliGRAM(s) Oral every 6 hours  albuterol/ipratropium for Nebulization 3 milliLiter(s) Nebulizer every 6 hours  ascorbic acid 500 milliGRAM(s) Oral two times a day  aspirin enteric coated 81 milliGRAM(s) Oral daily  ceFAZolin   IVPB 2000 milliGRAM(s) IV Intermittent every 8 hours  chlorhexidine 2% Cloths 1 Application(s) Topical daily  dextrose 50% Injectable 50 milliLiter(s) IV Push every 15 minutes  dextrose 50% Injectable 25 milliLiter(s) IV Push every 15 minutes  famotidine    Tablet 20 milliGRAM(s) Oral two times a day  gabapentin 100 milliGRAM(s) Oral every 8 hours  heparin   Injectable 5000 Unit(s) SubCutaneous every 8 hours  insulin regular Infusion 5 Unit(s)/Hr (5 mL/Hr) IV Continuous <Continuous>  meperidine     Injectable 25 milliGRAM(s) IV Push once  polyethylene glycol 3350 17 Gram(s) Oral daily  potassium chloride   Powder 40 milliEquivalent(s) Oral once  senna 2 Tablet(s) Oral at bedtime  sodium chloride 0.9%. 1000 milliLiter(s) (20 mL/Hr) IV Continuous <Continuous>  vancomycin  IVPB 1000 milliGRAM(s) IV Intermittent every 12 hours    MEDICATIONS  (PRN):  fentaNYL    Injectable 25 MICROGram(s) IV Push every 4 hours PRN Severe Pain (7 - 10)  HYDROmorphone  Injectable 0.5 milliGRAM(s) IV Push every 6 hours PRN Breakthrough Pain  oxyCODONE    IR 5 milliGRAM(s) Oral every 4 hours PRN Moderate Pain (4 - 6)  oxyCODONE    IR 10 milliGRAM(s) Oral every 4 hours PRN Severe Pain (7 - 10)    RADIOLOGY:  Chest X-ray Reviewed    REVIEW OF SYSTEMS:  CONSTITUTIONAL: [X] all negative; [ ] weakness, [ ] fevers, [ ] chills  EYES/ENT: [X] all negative; [ ] visual changes, [ ] vertigo, [ ] throat pain   NECK: [X] all negative; [ ] pain, [ ] stiffness  RESPIRATORY: [] all negative, [ ] cough, [ ] wheezing, [ ] hemoptysis, [ ] shortness of breath  CARDIOVASCULAR: [] all negative; [ ] chest pain, [ ] palpitations, [ ] orthopnea  GASTROINTESTINAL: [X] all negative; [ ]abdominal pain, [ ] nausea, [ ] vomiting, [ ] hematemesis, [ ] diarrhea, [ ] constipation, [ ] melena, [ ] hematochezia.  GENITOURINARY: [X] all negative; [ ] dysuria, [ ] frequency, [ ] hematuria  NEUROLOGICAL: [X] all negative; [ ] numbness, [ ] weakness  SKIN: [X] all negative; [ ] itching, [ ] burning, [ ] rashes, [ ] lesions   All other review of systems is negative unless indicated above.  [  ] Unable to assess ROS because     PHYSICAL EXAM:          CONSTITUTIONAL: Well-developed; well-nourished; in no acute distress.   	SKIN: warm, dry  	HEAD: Normocephalic; atraumatic.  	EYES: PERRL, EOM, no conj injection, sclera clear  	ENT: No nasal discharge; airway clear.  	NECK: Supple; non tender.   	CARD: S1, S2 normal; no murmurs, gallops, or rubs. Regular rate and rhythm.  no carotid bruits  	RESP: CTA B/L; good air movement No wheezes, rales or rhonchi.  	ABD: Soft, not tender, not distended,  no rebound or guarding, bowel sounds present  	EXT: Normal ROM.  No clubbing, cyanosis or edema.   warm and well perfused.  pulses palpable  	NEURO: Alert, awake, motor 5/5 R, 5/5 L    Assessment  73 y/o female sp MVR POD 1  i placed her on SBT and extubated in am    Plan:    Neuro:  Multimodal pain management  Tylenol, Lidoderm patch, gabapentin, opiates as needed  Monitor neuro status in the post op period    CV:  S/P MVR  Monitor perfusion, , lactate, UOP, index and MVO2 if available  Maintain MAP > 65  ASA, statin  Remove mediastinal tubes when able keep for today    Resp:  continue ventilation to keep spo2 > 92 and pH b/w 7.35-7.45  Supplemental oxygen to maintain sats > 92%  Continuous pulse oximetry monitoring  IS / Chest PT  Nebulizers as needed    GI:  NPO  PUD ppx per protocol    Renal  High risk for JAGDEEP post surgery  Monitor UOP, lytes, creatinine  Diuretics as needed for negative fluid balance  Keep K > 4, Mg > 2    Endo:  Tight glucose control per CTICU protocol  Insulin gtt as needed  Transition to Lantus / SSI and premeal insulin as needed    Heme:  Acute blood loss anemia post surgery  High risk for thrombocytopenia  DVT prophylaxis hep sq    ID:  Perioperative prophylactic abx per protocol  Monitor fever curve and WBC count  Remove TLC when no longer indicated    up in chair and ambulate    Upon my evaluation, the above patient has a high probabillity of imminent or life threatening deterioration due to a high risk for Shock, Cardiogenic shock, arrythmias, acute blood loss, acute kidney injury and acute pulmonary insufficiency which required my direct attention, intervention, and personal management.  Total time was 55 minutes which includes review of radiology results, ordering, interpreting and reviewing diagnostic studies / lab tests with immediate assessment and treatment, consultant collaboration on findings and treatment options, monitoring for potential decompensation, obtaining history from family, EMT, nursing home staff and/or treating physicians; directing the titration of pressors, oxygen support devices, fluid resucitation, interpretation of cardiac output measurements, interpretation of radiological assessments, interpretation of EKG / rhythm strips, telemetry.  This time did not overlap with the management of other patients or performing separately reportable procedures.      Management of this patient was discussed with the CT surgery attending and mid-level providers.    I ackknowledge the use of copied documentation.  All copy forward documentation is my own and has been reviewed and revised as appropriate.  If no changes were made, it is because that information remains the same.

## 2025-05-14 LAB
ALBUMIN SERPL ELPH-MCNC: 3.8 G/DL — SIGNIFICANT CHANGE UP (ref 3.5–5.2)
ALBUMIN SERPL ELPH-MCNC: 3.8 G/DL — SIGNIFICANT CHANGE UP (ref 3.5–5.2)
ALP SERPL-CCNC: 41 U/L — SIGNIFICANT CHANGE UP (ref 30–115)
ALP SERPL-CCNC: 46 U/L — SIGNIFICANT CHANGE UP (ref 30–115)
ALT FLD-CCNC: 11 U/L — SIGNIFICANT CHANGE UP (ref 0–41)
ALT FLD-CCNC: 17 U/L — SIGNIFICANT CHANGE UP (ref 0–41)
ANION GAP SERPL CALC-SCNC: 10 MMOL/L — SIGNIFICANT CHANGE UP (ref 7–14)
ANION GAP SERPL CALC-SCNC: 12 MMOL/L — SIGNIFICANT CHANGE UP (ref 7–14)
APTT BLD: 27.1 SEC — SIGNIFICANT CHANGE UP (ref 27–39.2)
AST SERPL-CCNC: 29 U/L — SIGNIFICANT CHANGE UP (ref 0–41)
AST SERPL-CCNC: 42 U/L — HIGH (ref 0–41)
BASOPHILS # BLD AUTO: 0.02 K/UL — SIGNIFICANT CHANGE UP (ref 0–0.2)
BASOPHILS NFR BLD AUTO: 0.2 % — SIGNIFICANT CHANGE UP (ref 0–1)
BILIRUB SERPL-MCNC: 0.3 MG/DL — SIGNIFICANT CHANGE UP (ref 0.2–1.2)
BILIRUB SERPL-MCNC: 0.4 MG/DL — SIGNIFICANT CHANGE UP (ref 0.2–1.2)
BUN SERPL-MCNC: 25 MG/DL — HIGH (ref 10–20)
BUN SERPL-MCNC: 26 MG/DL — HIGH (ref 10–20)
CALCIUM SERPL-MCNC: 8.1 MG/DL — LOW (ref 8.4–10.5)
CALCIUM SERPL-MCNC: 8.6 MG/DL — SIGNIFICANT CHANGE UP (ref 8.4–10.5)
CHLORIDE SERPL-SCNC: 105 MMOL/L — SIGNIFICANT CHANGE UP (ref 98–110)
CHLORIDE SERPL-SCNC: 108 MMOL/L — SIGNIFICANT CHANGE UP (ref 98–110)
CO2 SERPL-SCNC: 20 MMOL/L — SIGNIFICANT CHANGE UP (ref 17–32)
CO2 SERPL-SCNC: 22 MMOL/L — SIGNIFICANT CHANGE UP (ref 17–32)
CREAT SERPL-MCNC: 0.8 MG/DL — SIGNIFICANT CHANGE UP (ref 0.7–1.5)
CREAT SERPL-MCNC: 0.9 MG/DL — SIGNIFICANT CHANGE UP (ref 0.7–1.5)
EGFR: 68 ML/MIN/1.73M2 — SIGNIFICANT CHANGE UP
EGFR: 68 ML/MIN/1.73M2 — SIGNIFICANT CHANGE UP
EGFR: 78 ML/MIN/1.73M2 — SIGNIFICANT CHANGE UP
EGFR: 78 ML/MIN/1.73M2 — SIGNIFICANT CHANGE UP
EOSINOPHIL # BLD AUTO: 0 K/UL — SIGNIFICANT CHANGE UP (ref 0–0.7)
EOSINOPHIL NFR BLD AUTO: 0 % — SIGNIFICANT CHANGE UP (ref 0–8)
GAS PNL BLDA: SIGNIFICANT CHANGE UP
GLUCOSE BLDC GLUCOMTR-MCNC: 119 MG/DL — HIGH (ref 70–99)
GLUCOSE BLDC GLUCOMTR-MCNC: 127 MG/DL — HIGH (ref 70–99)
GLUCOSE BLDC GLUCOMTR-MCNC: 128 MG/DL — HIGH (ref 70–99)
GLUCOSE BLDC GLUCOMTR-MCNC: 145 MG/DL — HIGH (ref 70–99)
GLUCOSE SERPL-MCNC: 121 MG/DL — HIGH (ref 70–99)
GLUCOSE SERPL-MCNC: 128 MG/DL — HIGH (ref 70–99)
HCT VFR BLD CALC: 28.1 % — LOW (ref 37–47)
HGB BLD-MCNC: 9.6 G/DL — LOW (ref 12–16)
IMM GRANULOCYTES NFR BLD AUTO: 0.5 % — HIGH (ref 0.1–0.3)
INR BLD: 1.1 RATIO — SIGNIFICANT CHANGE UP (ref 0.65–1.3)
LYMPHOCYTES # BLD AUTO: 1.28 K/UL — SIGNIFICANT CHANGE UP (ref 1.2–3.4)
LYMPHOCYTES # BLD AUTO: 10.7 % — LOW (ref 20.5–51.1)
MAGNESIUM SERPL-MCNC: 2 MG/DL — SIGNIFICANT CHANGE UP (ref 1.8–2.4)
MAGNESIUM SERPL-MCNC: 2.3 MG/DL — SIGNIFICANT CHANGE UP (ref 1.8–2.4)
MCHC RBC-ENTMCNC: 28.6 PG — SIGNIFICANT CHANGE UP (ref 27–31)
MCHC RBC-ENTMCNC: 34.2 G/DL — SIGNIFICANT CHANGE UP (ref 32–37)
MCV RBC AUTO: 83.6 FL — SIGNIFICANT CHANGE UP (ref 81–99)
MONOCYTES # BLD AUTO: 1 K/UL — HIGH (ref 0.1–0.6)
MONOCYTES NFR BLD AUTO: 8.4 % — SIGNIFICANT CHANGE UP (ref 1.7–9.3)
NEUTROPHILS # BLD AUTO: 9.55 K/UL — HIGH (ref 1.4–6.5)
NEUTROPHILS NFR BLD AUTO: 80.2 % — HIGH (ref 42.2–75.2)
NRBC BLD AUTO-RTO: 0 /100 WBCS — SIGNIFICANT CHANGE UP (ref 0–0)
PHOSPHATE SERPL-MCNC: 2.4 MG/DL — SIGNIFICANT CHANGE UP (ref 2.1–4.9)
PLATELET # BLD AUTO: 158 K/UL — SIGNIFICANT CHANGE UP (ref 130–400)
PMV BLD: 10.3 FL — SIGNIFICANT CHANGE UP (ref 7.4–10.4)
POTASSIUM SERPL-MCNC: 4.1 MMOL/L — SIGNIFICANT CHANGE UP (ref 3.5–5)
POTASSIUM SERPL-MCNC: 4.3 MMOL/L — SIGNIFICANT CHANGE UP (ref 3.5–5)
POTASSIUM SERPL-SCNC: 4.1 MMOL/L — SIGNIFICANT CHANGE UP (ref 3.5–5)
POTASSIUM SERPL-SCNC: 4.3 MMOL/L — SIGNIFICANT CHANGE UP (ref 3.5–5)
PROT SERPL-MCNC: 5.2 G/DL — LOW (ref 6–8)
PROT SERPL-MCNC: 5.7 G/DL — LOW (ref 6–8)
PROTHROM AB SERPL-ACNC: 13 SEC — HIGH (ref 9.95–12.87)
RBC # BLD: 3.36 M/UL — LOW (ref 4.2–5.4)
RBC # FLD: 16 % — HIGH (ref 11.5–14.5)
SODIUM SERPL-SCNC: 137 MMOL/L — SIGNIFICANT CHANGE UP (ref 135–146)
SODIUM SERPL-SCNC: 140 MMOL/L — SIGNIFICANT CHANGE UP (ref 135–146)
WBC # BLD: 11.91 K/UL — HIGH (ref 4.8–10.8)
WBC # FLD AUTO: 11.91 K/UL — HIGH (ref 4.8–10.8)

## 2025-05-14 PROCEDURE — 71045 X-RAY EXAM CHEST 1 VIEW: CPT | Mod: 26,77

## 2025-05-14 PROCEDURE — 93010 ELECTROCARDIOGRAM REPORT: CPT | Mod: 76

## 2025-05-14 PROCEDURE — 99222 1ST HOSP IP/OBS MODERATE 55: CPT

## 2025-05-14 PROCEDURE — 99291 CRITICAL CARE FIRST HOUR: CPT

## 2025-05-14 PROCEDURE — 71045 X-RAY EXAM CHEST 1 VIEW: CPT | Mod: 26

## 2025-05-14 RX ORDER — ACETAMINOPHEN 500 MG/5ML
1000 LIQUID (ML) ORAL ONCE
Refills: 0 | Status: COMPLETED | OUTPATIENT
Start: 2025-05-14 | End: 2025-05-14

## 2025-05-14 RX ORDER — CALCIUM GLUCONATE 20 MG/ML
2 INJECTION, SOLUTION INTRAVENOUS ONCE
Refills: 0 | Status: COMPLETED | OUTPATIENT
Start: 2025-05-14 | End: 2025-05-14

## 2025-05-14 RX ORDER — IPRATROPIUM BROMIDE AND ALBUTEROL SULFATE .5; 2.5 MG/3ML; MG/3ML
3 SOLUTION RESPIRATORY (INHALATION) EVERY 6 HOURS
Refills: 0 | Status: COMPLETED | OUTPATIENT
Start: 2025-05-14 | End: 2025-05-17

## 2025-05-14 RX ORDER — FUROSEMIDE 10 MG/ML
20 INJECTION INTRAMUSCULAR; INTRAVENOUS EVERY 12 HOURS
Refills: 0 | Status: DISCONTINUED | OUTPATIENT
Start: 2025-05-14 | End: 2025-05-20

## 2025-05-14 RX ADMIN — HEPARIN SODIUM 5000 UNIT(S): 1000 INJECTION INTRAVENOUS; SUBCUTANEOUS at 21:33

## 2025-05-14 RX ADMIN — POLYETHYLENE GLYCOL 3350 17 GRAM(S): 17 POWDER, FOR SOLUTION ORAL at 11:37

## 2025-05-14 RX ADMIN — IPRATROPIUM BROMIDE AND ALBUTEROL SULFATE 3 MILLILITER(S): .5; 2.5 SOLUTION RESPIRATORY (INHALATION) at 19:40

## 2025-05-14 RX ADMIN — Medication 100 MILLIGRAM(S): at 14:45

## 2025-05-14 RX ADMIN — Medication 500 MILLIGRAM(S): at 18:28

## 2025-05-14 RX ADMIN — OXYCODONE HYDROCHLORIDE 5 MILLIGRAM(S): 30 TABLET ORAL at 08:49

## 2025-05-14 RX ADMIN — Medication 250 MILLIGRAM(S): at 05:05

## 2025-05-14 RX ADMIN — IPRATROPIUM BROMIDE AND ALBUTEROL SULFATE 3 MILLILITER(S): .5; 2.5 SOLUTION RESPIRATORY (INHALATION) at 08:35

## 2025-05-14 RX ADMIN — Medication 1000 MILLIGRAM(S): at 18:58

## 2025-05-14 RX ADMIN — Medication 400 MILLIGRAM(S): at 11:37

## 2025-05-14 RX ADMIN — OXYCODONE HYDROCHLORIDE 5 MILLIGRAM(S): 30 TABLET ORAL at 08:19

## 2025-05-14 RX ADMIN — LOSARTAN POTASSIUM 50 MILLIGRAM(S): 100 TABLET, FILM COATED ORAL at 05:06

## 2025-05-14 RX ADMIN — Medication 650 MILLIGRAM(S): at 05:06

## 2025-05-14 RX ADMIN — CALCIUM GLUCONATE 200 GRAM(S): 20 INJECTION, SOLUTION INTRAVENOUS at 03:34

## 2025-05-14 RX ADMIN — Medication 2 TABLET(S): at 21:33

## 2025-05-14 RX ADMIN — IPRATROPIUM BROMIDE AND ALBUTEROL SULFATE 3 MILLILITER(S): .5; 2.5 SOLUTION RESPIRATORY (INHALATION) at 02:06

## 2025-05-14 RX ADMIN — GABAPENTIN 100 MILLIGRAM(S): 400 CAPSULE ORAL at 21:33

## 2025-05-14 RX ADMIN — GABAPENTIN 100 MILLIGRAM(S): 400 CAPSULE ORAL at 14:45

## 2025-05-14 RX ADMIN — Medication 20 MILLIGRAM(S): at 05:14

## 2025-05-14 RX ADMIN — FUROSEMIDE 20 MILLIGRAM(S): 10 INJECTION INTRAMUSCULAR; INTRAVENOUS at 11:36

## 2025-05-14 RX ADMIN — HEPARIN SODIUM 5000 UNIT(S): 1000 INJECTION INTRAVENOUS; SUBCUTANEOUS at 14:45

## 2025-05-14 RX ADMIN — FUROSEMIDE 20 MILLIGRAM(S): 10 INJECTION INTRAMUSCULAR; INTRAVENOUS at 18:29

## 2025-05-14 RX ADMIN — OXYCODONE HYDROCHLORIDE 5 MILLIGRAM(S): 30 TABLET ORAL at 21:32

## 2025-05-14 RX ADMIN — Medication 400 MILLIGRAM(S): at 18:28

## 2025-05-14 RX ADMIN — Medication 81 MILLIGRAM(S): at 11:37

## 2025-05-14 RX ADMIN — Medication 1 APPLICATION(S): at 11:38

## 2025-05-14 RX ADMIN — Medication 500 MILLIGRAM(S): at 05:06

## 2025-05-14 RX ADMIN — Medication 650 MILLIGRAM(S): at 01:59

## 2025-05-14 RX ADMIN — HEPARIN SODIUM 5000 UNIT(S): 1000 INJECTION INTRAVENOUS; SUBCUTANEOUS at 05:06

## 2025-05-14 RX ADMIN — ATORVASTATIN CALCIUM 40 MILLIGRAM(S): 80 TABLET, FILM COATED ORAL at 21:32

## 2025-05-14 RX ADMIN — GABAPENTIN 100 MILLIGRAM(S): 400 CAPSULE ORAL at 05:05

## 2025-05-14 RX ADMIN — Medication 100 MILLIGRAM(S): at 05:05

## 2025-05-14 RX ADMIN — OXYCODONE HYDROCHLORIDE 5 MILLIGRAM(S): 30 TABLET ORAL at 22:32

## 2025-05-14 RX ADMIN — Medication 1000 MILLIGRAM(S): at 12:07

## 2025-05-14 RX ADMIN — Medication 20 MILLIGRAM(S): at 18:28

## 2025-05-14 RX ADMIN — IPRATROPIUM BROMIDE AND ALBUTEROL SULFATE 3 MILLILITER(S): .5; 2.5 SOLUTION RESPIRATORY (INHALATION) at 15:10

## 2025-05-14 NOTE — DIETITIAN INITIAL EVALUATION ADULT - ADD RECOMMEND
1. ADD Glucerna Shake 2X/DAILY -- provides 440 kcal, 20g pro total  2. Continue with current diet order  3. Encourage PO intake    High risk f/u

## 2025-05-14 NOTE — PROGRESS NOTE ADULT - SUBJECTIVE AND OBJECTIVE BOX
OPERATIVE PROCEDURE(s):  MVR           POD #   2                   SURGEON(s): KALPANA Guillaume    HPI: 75 yo F with PMHX of HTN, HLD, MR, COPD s/p MV repair 5/12 with Dr. Guillaume. Post op course complicated by coagulopathy, recevied 2 units rbc, 2 units platelets, cryo x2 and 26 mcg of DDAVP and new onset high grade AV block.     SUBJECTIVE ASSESSMENT: 4yFemale patient seen and examined at bedside. Pt denies any SOB, dizziness, chest pain, N/V.     Vital Signs Last 24 Hrs  T(F): 97.8 (14 May 2025 12:00), Max: 98.1 (13 May 2025 20:00)  HR: 77 (14 May 2025 16:00) (61 - 82)  BP: 116/60 (14 May 2025 11:00) (103/55 - 129/60)  BP(mean): 82 (14 May 2025 11:00) (75 - 88)  ABP: 152/66 (14 May 2025 16:00) (114/43 - 152/66)  ABP(mean): 90 (14 May 2025 16:00)  RR: 17 (14 May 2025 16:00) (16 - 24)  SpO2: 97% (14 May 2025 16:00) (93% - 97%)  CVP(mm Hg): --  CVP(cm H2O): --  CO: --  CI: --  PA: --  SVR: --    I&O's Detail    13 May 2025 07:01  -  14 May 2025 07:00  --------------------------------------------------------  IN:    DOBUTamine: 2.6 mL    Insulin: 7 mL    IV PiggyBack: 300 mL    IV PiggyBack: 100 mL    IV PiggyBack: 500 mL    IV PiggyBack: 100 mL    Oral Fluid: 720 mL    sodium chloride 0.9%: 160 mL  Total IN: 1889.6 mL    OUT:    Chest Tube (mL): 220 mL    Indwelling Catheter - Urethral (mL): 1070 mL  Total OUT: 1290 mL        Net: I&O's Detail    12 May 2025 07:01  -  13 May 2025 07:00  --------------------------------------------------------  Total NET: -231.7 mL      13 May 2025 07:01  -  14 May 2025 07:00  --------------------------------------------------------  Total NET: 599.6 mL        CAPILLARY BLOOD GLUCOSE      POCT Blood Glucose.: 119 mg/dL (14 May 2025 14:38)  POCT Blood Glucose.: 145 mg/dL (14 May 2025 11:29)  POCT Blood Glucose.: 128 mg/dL (14 May 2025 06:58)  POCT Blood Glucose.: 158 mg/dL (13 May 2025 21:03)    A1C with Estimated Average Glucose Result: 5.8 % (04-30-25 @ 10:34)      Physical Exam:  General: NAD; A&Ox3  Cardiac: S1/S2, RRR, no murmur, no rubs  Lungs: unlabored respirations, CTA b/l, no wheeze, no rales, no crackles  Abdomen: Soft/NT/ND; positive bowel sounds x 4  Sternum: Intact, no click, incision healing well with no drainage  Incisions: Incisions clean/dry/intact  Extremities: No edema b/l lower extremities; good capillary refill; no cyanosis; palpable 1+ pedal pulses b/l    Central Venous Catheter: Yes: critical illness, intravenous access  Day # 2  Hunter Catheter: Yes: critical illness; monitor strict i/o's                    Day # 2  EPICARDIAL WIRES:  YES                                                                         Day # 2  BOWEL MOVEMENT:  [] YES [X NO, If No, Timing since last BM Day #5/12/25  CHEST TUBE(MS/Left/Right):  [] YES X No         LABS:                        9.6[L]  11.91[H] )-----------( 158      ( 14 May 2025 01:34 )             28.1[L]                        9.5[L]  9.89  )-----------( 158      ( 13 May 2025 14:30 )             28.0[L]    05-14    137  |  105  |  26[H]  ----------------------------<  121[H]  4.1   |  22  |  0.9  05-14    140  |  108  |  25[H]  ----------------------------<  128[H]  4.3   |  20  |  0.8    Ca    8.6      14 May 2025 14:52  Phos  2.4     05-14  Mg     2.0     05-14    TPro  5.7[L] [6.0 - 8.0]  /  Alb  3.8 [3.5 - 5.2]  /  TBili  0.3 [0.2 - 1.2]  /  DBili  x   /  AST  29 [0 - 41]  /  ALT  11 [0 - 41]  /  AlkPhos  46 [30 - 115]  05-14    PT/INR - ( 14 May 2025 01:34 )   PT: ;   INR: 1.10 ratio         PT/INR - ( 13 May 2025 02:00 )   PT: ;   INR: 1.07 ratio         PTT - ( 14 May 2025 01:34 )  PTT:27.1 sec, PTT - ( 13 May 2025 02:00 )  PTT:27.1 sec  Urinalysis Basic - ( 14 May 2025 14:52 )    Color: x / Appearance: x / SG: x / pH: x  Gluc: 121 mg/dL / Ketone: x  / Bili: x / Urobili: x   Blood: x / Protein: x / Nitrite: x   Leuk Esterase: x / RBC: x / WBC x   Sq Epi: x / Non Sq Epi: x / Bacteria: x      ABG - ( 14 May 2025 05:15 )  pH: 7.41  /  pCO2: 33    /  pO2: 65    / HCO3: 21    / Base Excess: -3.1  /  SaO2: 94.3  /  LA: 1.4              RADIOLOGY & ADDITIONAL TESTS:  CXR:   < from: Xray Chest 1 View- PORTABLE-Urgent (Xray Chest 1 View- PORTABLE-Urgent .) (05.14.25 @ 13:32) >  ACC: [id number] EXAM:  XR CHEST PORTABLE URGENT 1V   ORDERED BY: JR KERR     PROCEDURE DATE:  05/14/2025          INTERPRETATION:  CLINICAL HISTORY: Shortness of breath    COMPARISON: Earlier the same day.    TECHNIQUE: Portable frontal chest radiograph.    FINDINGS:    Support devices: Telemetry leads are seen. Right neck central catheter.    Cardiac/mediastinum/hilum: Cardiomegaly. Status post median sternotomy   and valve repair.    Lung parenchyma/Pleura: Bilateral opacifications and effusions.    Skeleton/soft tissues: Stable.      IMPRESSION:    Cardiomegaly. CHF. Stable.    --- End of Report ---          < end of copied text >      EKG:    < from: 12 Lead ECG (05.13.25 @ 08:43) >    Ventricular Rate 69 BPM    Atrial Rate 69 BPM    P-R Interval 274 ms    QRS Duration 90 ms    Q-T Interval 432 ms    QTC Calculation(Bazett) 462 ms    P Axis 34 degrees    R Axis 40 degrees    T Axis 51 degrees    Diagnosis Line Sinus rhythm with 1st degree A-V block  Otherwise normal ECG    Confirmed by Christofer Kinney ([id number]) on 5/13/2025 11:12:42 AM    < end of copied text >      Allergies    No Known Allergies    Intolerances      MEDICATIONS  (STANDING):  albuterol/ipratropium for Nebulization 3 milliLiter(s) Nebulizer every 6 hours  ascorbic acid 500 milliGRAM(s) Oral two times a day  aspirin enteric coated 81 milliGRAM(s) Oral daily  atorvastatin 40 milliGRAM(s) Oral at bedtime  bisacodyl Suppository 10 milliGRAM(s) Rectal once  chlorhexidine 2% Cloths 1 Application(s) Topical daily  dextrose 5%. 1000 milliLiter(s) (50 mL/Hr) IV Continuous <Continuous>  dextrose 5%. 1000 milliLiter(s) (100 mL/Hr) IV Continuous <Continuous>  dextrose 50% Injectable 50 milliLiter(s) IV Push every 15 minutes  dextrose 50% Injectable 25 milliLiter(s) IV Push every 15 minutes  famotidine    Tablet 20 milliGRAM(s) Oral two times a day  furosemide   Injectable 20 milliGRAM(s) IV Push every 12 hours  gabapentin 100 milliGRAM(s) Oral every 8 hours  glucagon  Injectable 1 milliGRAM(s) IntraMuscular once  heparin   Injectable 5000 Unit(s) SubCutaneous every 8 hours  insulin lispro (ADMELOG) corrective regimen sliding scale   SubCutaneous three times a day before meals  insulin lispro (ADMELOG) corrective regimen sliding scale   SubCutaneous at bedtime  meperidine     Injectable 25 milliGRAM(s) IV Push once  polyethylene glycol 3350 17 Gram(s) Oral daily  senna 2 Tablet(s) Oral at bedtime  sodium chloride 0.9%. 1000 milliLiter(s) (20 mL/Hr) IV Continuous <Continuous>    MEDICATIONS  (PRN):  dextrose Oral Gel 15 Gram(s) Oral once PRN Blood Glucose LESS THAN 70 milliGRAM(s)/deciliter  fentaNYL    Injectable 25 MICROGram(s) IV Push every 4 hours PRN Severe Pain (7 - 10)  HYDROmorphone  Injectable 0.5 milliGRAM(s) IV Push every 6 hours PRN Breakthrough Pain  oxyCODONE    IR 5 milliGRAM(s) Oral every 4 hours PRN Moderate Pain (4 - 6)  oxyCODONE    IR 10 milliGRAM(s) Oral every 4 hours PRN Severe Pain (7 - 10)    Home Medications:  aspirin 81 mg oral tablet: orally once a day (12 May 2025 06:55)  aspirin 81 mg po q day:  (12 May 2025 06:55)  Famotidine: 40 milligram(s) orally once a day (12 May 2025 06:55)  loratadine po q day:  (12 May 2025 06:55)  losartan hctz 50/12.5 mg po q day :  (12 May 2025 06:55)  Meloxicam: 15 milligram(s) orally once a day (12 May 2025 06:55)      Pharmacologic DVT Prophylaxis [X YES, [] NO: Contraindication:  SCD's: YES b/l    GI Prophylaxis: pepcid    Post-Op Beta-Blockers: [X Yes, [] No: contraindication:  Post-Op CCB: [X Yes, [] No: contraindication:  Post-Op Aspirin:  [X Yes, [] No: contraindication:  Post-Op Statin:  X] Yes, [] No: contraindication:    Ambulation/Activity Status: Advance as tolerated, Ambulating with PT    Assessment/Plan:  74y Female status-post  - Case and plan discussed with CTU Intensivist and CT Surgeon - Dr. Guillaume  - Continue CTU supportive care and ongoing plan of care as per continuing CTU rounds.   - Continue DVT/GI prophylaxis  - Incentive Spirometry 10 times an hour  - Continue to advance physical activity as tolerated and continue PT/OT as directed      1. CAD s/p CABG: Continue ASA, statin, BB held d/t bradycardia.replete electrolytes PRN. PRNs and adjuvants for analgesia  2. HTN: Continue lopressor as tolerated by HR/BP  3. Post-op A. Fib ppx: Afib ppx with it C  4. COPD/Hypoxia: Coughing and deep breathing exercises/incentive spirometry encouraged/chest PT  5. DM/Glucose Control: FS ACHS with coverage for glycemic control  6. High grade AV block: EP consulted, Recs appreciated. Will monitor for now      Social Service Disposition:  Home with home care

## 2025-05-14 NOTE — DIETITIAN INITIAL EVALUATION ADULT - OTHER CALCULATIONS
Using ABW: ENERGY: 5158-3774 kcal/day (MSJ 1341.49* 1-1.2SF); PROTEIN:  g/day (1.5-2 g/kg IBW 54.5 KG); FLUID: 8632-5455 mL/day (20-25 mL/kg) -- with consideration for age, BMI, surgery, critical illness setting

## 2025-05-14 NOTE — CONSULT NOTE ADULT - ASSESSMENT
#2:1 AVB with Mobitz 2nd degree Type I  #MR s/p MV repair, POD #2  #HTN  #COPD    Pacer interrogated - rate decreased to 45, sensitivity decreased to 2.5    Recommendations:  - C/w telemetry monitoring  - Keep off AVN blockers  - Maintain K, Mg, and Ca within normal limits  - Will monitor HR for improvement and decide on need for eventual PPM

## 2025-05-14 NOTE — DIETITIAN INITIAL EVALUATION ADULT - OTHER INFO
Pertinent Medical Information: Per H&P, pt is a 75 y/o female with PMHx of covid/smoking, HTN, hyperlipidemia, gastric ulcer. s/p elective MVR.   5/12 Patient arrived to ICU , sedated on ventilator, large A-a gradient, coagulopathy, recevied 2 units rbc, 2 units platelets, cryo x2 and 26 mcg of DDAVP  5/13 extubated     Pertinent Subjective Information: Pt consumed 50% of [place] toast this morning. Pt states throat is sore from surgery. No chewing difficulties noted. No nausea or vomiting reported.     Weight hx: #/85 KG -- checked 1 month ago per pt. Current dosing weight is 85 KG. Weight stable within 1 month compared to current dosing weight. Pt does not meet weight criteria for malnutrition at this time.

## 2025-05-14 NOTE — DIETITIAN INITIAL EVALUATION ADULT - ORAL INTAKE PTA/DIET HISTORY
Discussed with pt and daughter at bedside this morning. Pt speaks Maltese. Daughter able to translate for pt. Pt reports having a good appetite prior to admit; consumed 2 meals daily + snacks. Denies drinking protein shake supplements. Denies taking vitamin or mineral supplements. NKFA; denies any restrictive cultural or Lutheran food preferences. No chewing or swallowing difficulties noted with regular textured food.

## 2025-05-14 NOTE — DIETITIAN INITIAL EVALUATION ADULT - PERTINENT LABORATORY DATA
05-14    140  |  108  |  25[H]  ----------------------------<  128[H]  4.3   |  20  |  0.8    Ca    8.1[L]      14 May 2025 01:34  Mg     2.3     05-14    TPro  5.2[L]  /  Alb  3.8  /  TBili  0.4  /  DBili  x   /  AST  42[H]  /  ALT  17  /  AlkPhos  41  05-14  POCT Blood Glucose.: 145 mg/dL (05-14-25 @ 11:29)  A1C with Estimated Average Glucose Result: 5.8 % (04-30-25 @ 10:34)

## 2025-05-14 NOTE — PROGRESS NOTE ADULT - SUBJECTIVE AND OBJECTIVE BOX
CTU Attending Progress Daily Note     13 May 2025 18:29  Procedure: MVR     He has history of Gastric ulcer, h/o smoking, HTN    Benign essential HTN    Hyperlipidemia    Mitral regurgitation      HPI:73 y/o female with pmx of covid/smoking, HTN and elective MVR      Hospital Course:  5/12 Patient arrived to ICU , sedated on ventilator, large A-a gradient, coagulopathy, recevied 2 units rbc, 2 units platelets, cryo x2 and 26 mcg of DDAVP  5/13 extubated   5/14: POD # 2 - On NC 3 liters O2, EKG- NSR - ascclerated junctional - no symptoms, CXR congested - lasix 20 mg BID, OOBTC, DC chest tube after walking      OBJECTIVE:  ICU Vital Signs Last 24 Hrs  T(C): 36.6 (14 May 2025 04:00), Max: 37.1 (13 May 2025 16:00)  T(F): 97.9 (14 May 2025 04:00), Max: 98.8 (13 May 2025 16:00)  HR: 69 (14 May 2025 10:00) (66 - 82)  BP: 105/62 (14 May 2025 10:00) (97/53 - 129/60)  BP(mean): 79 (14 May 2025 10:00) (72 - 88)  ABP: 130/60 (14 May 2025 10:00) (114/43 - 146/59)  ABP(mean): 78 (14 May 2025 10:00) (63 - 89)  RR: 19 (14 May 2025 10:00) (16 - 26)  SpO2: 95% (14 May 2025 10:00) (93% - 97%)    O2 Parameters below as of 14 May 2025 09:00  Patient On (Oxygen Delivery Method): nasal cannula w/ humidification  O2 Flow (L/min): 3        I&O's Summary    13 May 2025 07:01  -  14 May 2025 07:00  --------------------------------------------------------  IN: 1889.6 mL / OUT: 1290 mL / NET: 599.6 mL    14 May 2025 07:01  -  14 May 2025 13:05  --------------------------------------------------------  IN: 240 mL / OUT: 125 mL / NET: 115 mL      I&O's Detail    13 May 2025 07:01  -  14 May 2025 07:00  --------------------------------------------------------  IN:    DOBUTamine: 2.6 mL    Insulin: 7 mL    IV PiggyBack: 300 mL    IV PiggyBack: 100 mL    IV PiggyBack: 500 mL    IV PiggyBack: 100 mL    Oral Fluid: 720 mL    sodium chloride 0.9%: 160 mL  Total IN: 1889.6 mL    OUT:    Chest Tube (mL): 220 mL    Indwelling Catheter - Urethral (mL): 1070 mL  Total OUT: 1290 mL    Total NET: 599.6 mL      14 May 2025 07:01  -  14 May 2025 13:05  --------------------------------------------------------  IN:    Oral Fluid: 240 mL  Total IN: 240 mL    OUT:    Chest Tube (mL): 0 mL    Indwelling Catheter - Urethral (mL): 125 mL  Total OUT: 125 mL    Total NET: 115 mL    CAPILLARY BLOOD GLUCOSE      POCT Blood Glucose.: 145 mg/dL (14 May 2025 11:29)  POCT Blood Glucose.: 128 mg/dL (14 May 2025 06:58)  POCT Blood Glucose.: 158 mg/dL (13 May 2025 21:03)  POCT Blood Glucose.: 162 mg/dL (13 May 2025 18:06)  POCT Blood Glucose.: 156 mg/dL (13 May 2025 14:12)    LABS:  ABG - ( 14 May 2025 05:15 )  pH, Arterial: 7.41  pH, Blood: x     /  pCO2: 33    /  pO2: 65    / HCO3: 21    / Base Excess: -3.1  /  SaO2: 94.3                                    9.6    11.91 )-----------( 158      ( 14 May 2025 01:34 )             28.1     05-14    140  |  108  |  25[H]  ----------------------------<  128[H]  4.3   |  20  |  0.8    Ca    8.1[L]      14 May 2025 01:34  Mg     2.3     05-14    TPro  5.2[L]  /  Alb  3.8  /  TBili  0.4  /  DBili  x   /  AST  42[H]  /  ALT  17  /  AlkPhos  41  05-14    PT/INR - ( 14 May 2025 01:34 )   PT: 13.00 sec;   INR: 1.10 ratio         PTT - ( 14 May 2025 01:34 )  PTT:27.1 sec  Urinalysis Basic - ( 14 May 2025 01:34 )    Color: x / Appearance: x / SG: x / pH: x  Gluc: 128 mg/dL / Ketone: x  / Bili: x / Urobili: x   Blood: x / Protein: x / Nitrite: x   Leuk Esterase: x / RBC: x / WBC x   Sq Epi: x / Non Sq Epi: x / Bacteria: x        Home Medications:  aspirin 81 mg oral tablet: orally once a day (12 May 2025 06:55)  aspirin 81 mg po q day:  (12 May 2025 06:55)  Famotidine: 40 milligram(s) orally once a day (12 May 2025 06:55)  loratadine po q day:  (12 May 2025 06:55)  losartan hctz 50/12.5 mg po q day :  (12 May 2025 06:55)  Meloxicam: 15 milligram(s) orally once a day (12 May 2025 06:55)    HOSPITAL MEDICATIONS:  MEDICATIONS  (STANDING):  albuterol/ipratropium for Nebulization 3 milliLiter(s) Nebulizer every 6 hours  ascorbic acid 500 milliGRAM(s) Oral two times a day  aspirin enteric coated 81 milliGRAM(s) Oral daily  atorvastatin 40 milliGRAM(s) Oral at bedtime  bisacodyl Suppository 10 milliGRAM(s) Rectal once  ceFAZolin   IVPB 2000 milliGRAM(s) IV Intermittent every 8 hours  chlorhexidine 2% Cloths 1 Application(s) Topical daily  dextrose 5%. 1000 milliLiter(s) (50 mL/Hr) IV Continuous <Continuous>  dextrose 5%. 1000 milliLiter(s) (100 mL/Hr) IV Continuous <Continuous>  dextrose 50% Injectable 50 milliLiter(s) IV Push every 15 minutes  dextrose 50% Injectable 25 milliLiter(s) IV Push every 15 minutes  famotidine    Tablet 20 milliGRAM(s) Oral two times a day  furosemide   Injectable 20 milliGRAM(s) IV Push every 12 hours  gabapentin 100 milliGRAM(s) Oral every 8 hours  glucagon  Injectable 1 milliGRAM(s) IntraMuscular once  heparin   Injectable 5000 Unit(s) SubCutaneous every 8 hours  insulin lispro (ADMELOG) corrective regimen sliding scale   SubCutaneous three times a day before meals  insulin lispro (ADMELOG) corrective regimen sliding scale   SubCutaneous at bedtime  losartan 50 milliGRAM(s) Oral daily  meperidine     Injectable 25 milliGRAM(s) IV Push once  polyethylene glycol 3350 17 Gram(s) Oral daily  senna 2 Tablet(s) Oral at bedtime  sodium chloride 0.9%. 1000 milliLiter(s) (20 mL/Hr) IV Continuous <Continuous>    MEDICATIONS  (PRN):  dextrose Oral Gel 15 Gram(s) Oral once PRN Blood Glucose LESS THAN 70 milliGRAM(s)/deciliter  fentaNYL    Injectable 25 MICROGram(s) IV Push every 4 hours PRN Severe Pain (7 - 10)  HYDROmorphone  Injectable 0.5 milliGRAM(s) IV Push every 6 hours PRN Breakthrough Pain  oxyCODONE    IR 5 milliGRAM(s) Oral every 4 hours PRN Moderate Pain (4 - 6)  oxyCODONE    IR 10 milliGRAM(s) Oral every 4 hours PRN Severe Pain (7 - 10)    RADIOLOGY:  Chest X-ray Reviewed    REVIEW OF SYSTEMS:  CONSTITUTIONAL: [X] all negative; [ ] weakness, [ ] fevers, [ ] chills  EYES/ENT: [X] all negative; [ ] visual changes, [ ] vertigo, [ ] throat pain   NECK: [X] all negative; [ ] pain, [ ] stiffness  RESPIRATORY: [] all negative, [ ] cough, [ ] wheezing, [ ] hemoptysis, [ ] shortness of breath  CARDIOVASCULAR: [] all negative; [ ] chest pain, [ ] palpitations, [ ] orthopnea  GASTROINTESTINAL: [X] all negative; [ ]abdominal pain, [ ] nausea, [ ] vomiting, [ ] hematemesis, [ ] diarrhea, [ ] constipation, [ ] melena, [ ] hematochezia.  GENITOURINARY: [X] all negative; [ ] dysuria, [ ] frequency, [ ] hematuria  NEUROLOGICAL: [X] all negative; [ ] numbness, [ ] weakness  SKIN: [X] all negative; [ ] itching, [ ] burning, [ ] rashes, [ ] lesions   All other review of systems is negative unless indicated above.  [  ] Unable to assess ROS because     PHYSICAL EXAM:          CONSTITUTIONAL: in no acute distress.   	SKIN: warm, dry  	HEAD: Normocephalic; atraumatic.  	EYES: PERRL, EOM, no conj injection, sclera clear  	ENT: No nasal discharge; airway clear.  	NECK: Supple; non tender.   	CARD: S1, S2 normal; no murmurs, gallops, or rubs. Regular rate and rhythm.  no carotid bruits  	RESP: CTA B/L; good air movement No wheezes, rales or rhonchi.  	ABD: Soft, not tender, not distended,  no rebound or guarding, bowel sounds present  	EXT: Normal ROM.  No clubbing, cyanosis or edema.   warm and well perfused.  pulses palpable  	NEURO: Alert, awake, motor 5/5 R, 5/5 L    Assessment  73 y/o female sp MVR    Plan:    Neuro:  Multimodal pain management  Tylenol, Lidoderm patch, gabapentin, opiates as needed  Normal neuro status in the post op period    CV:  S/P MVR  Maintain MAP > 65  ASA, statin, lasix BID   CXR congested   Remove chest tubes after walking today    Resp:  continue ventilation to keep spo2 > 92 and pH b/w 7.35-7.45  Supplemental oxygen to maintain sats > 92%  Continuous pulse oximetry monitoring  IS / Chest PT  Nebulizers as needed    GI:  Diet   PUD ppx per protocol    Renal  High risk for JAGDEEP post surgery  Monitor UOP, lytes, creatinine  Diuretics as needed for negative fluid balance  Keep K > 4, Mg > 2    Endo:  Tight glucose control per CTICU protocol  Insulin gtt off  / SSI as needed    Heme:  s/p Acute blood loss anemia post surgery  High risk for thrombocytopenia  DVT prophylaxis hep sq    ID:  Perioperative prophylactic abx per protocol  Monitor fever curve and WBC count  Remove TLC when no longer indicated    Upon my evaluation, the above patient has a high probabillity of imminent or life threatening deterioration due to a high risk for Shock, Cardiogenic shock, arrythmias, acute blood loss, acute kidney injury and acute pulmonary insufficiency which required my direct attention, intervention, and personal management.  Total time was 55 minutes which includes review of radiology results, ordering, interpreting and reviewing diagnostic studies / lab tests with immediate assessment and treatment, consultant collaboration on findings and treatment options, monitoring for potential decompensation, obtaining history from family, EMT, nursing home staff and/or treating physicians; directing the titration of pressors, oxygen support devices, fluid resucitation, interpretation of cardiac output measurements, interpretation of radiological assessments, interpretation of EKG / rhythm strips, telemetry.  This time did not overlap with the management of other patients or performing separately reportable procedures.      Management of this patient was discussed with the CT surgery attending and mid-level providers.    I ackknowledge the use of copied documentation.  All copy forward documentation is my own and has been reviewed and revised as appropriate.  If no changes were made, it is because that information remains the same. CTU Attending Progress Daily Note     13 May 2025 18:29  Procedure: MVR     He has history of Gastric ulcer, h/o smoking, HTN    Benign essential HTN    Hyperlipidemia    Mitral regurgitation      HPI:73 y/o female with pmx of covid/smoking, HTN and elective MVR      Hospital Course:  5/12 Patient arrived to ICU , sedated on ventilator, large A-a gradient, coagulopathy, recevied 2 units rbc, 2 units platelets, cryo x2 and 26 mcg of DDAVP  5/13 extubated   5/14: POD # 2 - On NC 3 liters O2, EKG- NSR - accelerated junctional - no symptoms, CXR congested - lasix 20 mg BID, OOBTC, DC chest tube after walking  Had low HR/ high degree AV block alternating with first degree - paced at 60/mt - intermittently - EP consulted and monitor at this time - johnny recheck send- Possibly need pacer if no recovery       OBJECTIVE:  ICU Vital Signs Last 24 Hrs  T(C): 36.6 (14 May 2025 04:00), Max: 37.1 (13 May 2025 16:00)  T(F): 97.9 (14 May 2025 04:00), Max: 98.8 (13 May 2025 16:00)  HR: 69 (14 May 2025 10:00) (66 - 82)  BP: 105/62 (14 May 2025 10:00) (97/53 - 129/60)  BP(mean): 79 (14 May 2025 10:00) (72 - 88)  ABP: 130/60 (14 May 2025 10:00) (114/43 - 146/59)  ABP(mean): 78 (14 May 2025 10:00) (63 - 89)  RR: 19 (14 May 2025 10:00) (16 - 26)  SpO2: 95% (14 May 2025 10:00) (93% - 97%)    O2 Parameters below as of 14 May 2025 09:00  Patient On (Oxygen Delivery Method): nasal cannula w/ humidification  O2 Flow (L/min): 3        I&O's Summary    13 May 2025 07:01  -  14 May 2025 07:00  --------------------------------------------------------  IN: 1889.6 mL / OUT: 1290 mL / NET: 599.6 mL    14 May 2025 07:01  -  14 May 2025 13:05  --------------------------------------------------------  IN: 240 mL / OUT: 125 mL / NET: 115 mL      I&O's Detail    13 May 2025 07:01  -  14 May 2025 07:00  --------------------------------------------------------  IN:    DOBUTamine: 2.6 mL    Insulin: 7 mL    IV PiggyBack: 300 mL    IV PiggyBack: 100 mL    IV PiggyBack: 500 mL    IV PiggyBack: 100 mL    Oral Fluid: 720 mL    sodium chloride 0.9%: 160 mL  Total IN: 1889.6 mL    OUT:    Chest Tube (mL): 220 mL    Indwelling Catheter - Urethral (mL): 1070 mL  Total OUT: 1290 mL    Total NET: 599.6 mL      14 May 2025 07:01  -  14 May 2025 13:05  --------------------------------------------------------  IN:    Oral Fluid: 240 mL  Total IN: 240 mL    OUT:    Chest Tube (mL): 0 mL    Indwelling Catheter - Urethral (mL): 125 mL  Total OUT: 125 mL    Total NET: 115 mL    CAPILLARY BLOOD GLUCOSE      POCT Blood Glucose.: 145 mg/dL (14 May 2025 11:29)  POCT Blood Glucose.: 128 mg/dL (14 May 2025 06:58)  POCT Blood Glucose.: 158 mg/dL (13 May 2025 21:03)  POCT Blood Glucose.: 162 mg/dL (13 May 2025 18:06)  POCT Blood Glucose.: 156 mg/dL (13 May 2025 14:12)    LABS:  ABG - ( 14 May 2025 05:15 )  pH, Arterial: 7.41  pH, Blood: x     /  pCO2: 33    /  pO2: 65    / HCO3: 21    / Base Excess: -3.1  /  SaO2: 94.3                                    9.6    11.91 )-----------( 158      ( 14 May 2025 01:34 )             28.1     05-14    140  |  108  |  25[H]  ----------------------------<  128[H]  4.3   |  20  |  0.8    Ca    8.1[L]      14 May 2025 01:34  Mg     2.3     05-14    TPro  5.2[L]  /  Alb  3.8  /  TBili  0.4  /  DBili  x   /  AST  42[H]  /  ALT  17  /  AlkPhos  41  05-14    PT/INR - ( 14 May 2025 01:34 )   PT: 13.00 sec;   INR: 1.10 ratio         PTT - ( 14 May 2025 01:34 )  PTT:27.1 sec  Urinalysis Basic - ( 14 May 2025 01:34 )    Color: x / Appearance: x / SG: x / pH: x  Gluc: 128 mg/dL / Ketone: x  / Bili: x / Urobili: x   Blood: x / Protein: x / Nitrite: x   Leuk Esterase: x / RBC: x / WBC x   Sq Epi: x / Non Sq Epi: x / Bacteria: x        Home Medications:  aspirin 81 mg oral tablet: orally once a day (12 May 2025 06:55)  aspirin 81 mg po q day:  (12 May 2025 06:55)  Famotidine: 40 milligram(s) orally once a day (12 May 2025 06:55)  loratadine po q day:  (12 May 2025 06:55)  losartan hctz 50/12.5 mg po q day :  (12 May 2025 06:55)  Meloxicam: 15 milligram(s) orally once a day (12 May 2025 06:55)    HOSPITAL MEDICATIONS:  MEDICATIONS  (STANDING):  albuterol/ipratropium for Nebulization 3 milliLiter(s) Nebulizer every 6 hours  ascorbic acid 500 milliGRAM(s) Oral two times a day  aspirin enteric coated 81 milliGRAM(s) Oral daily  atorvastatin 40 milliGRAM(s) Oral at bedtime  bisacodyl Suppository 10 milliGRAM(s) Rectal once  ceFAZolin   IVPB 2000 milliGRAM(s) IV Intermittent every 8 hours  chlorhexidine 2% Cloths 1 Application(s) Topical daily  dextrose 5%. 1000 milliLiter(s) (50 mL/Hr) IV Continuous <Continuous>  dextrose 5%. 1000 milliLiter(s) (100 mL/Hr) IV Continuous <Continuous>  dextrose 50% Injectable 50 milliLiter(s) IV Push every 15 minutes  dextrose 50% Injectable 25 milliLiter(s) IV Push every 15 minutes  famotidine    Tablet 20 milliGRAM(s) Oral two times a day  furosemide   Injectable 20 milliGRAM(s) IV Push every 12 hours  gabapentin 100 milliGRAM(s) Oral every 8 hours  glucagon  Injectable 1 milliGRAM(s) IntraMuscular once  heparin   Injectable 5000 Unit(s) SubCutaneous every 8 hours  insulin lispro (ADMELOG) corrective regimen sliding scale   SubCutaneous three times a day before meals  insulin lispro (ADMELOG) corrective regimen sliding scale   SubCutaneous at bedtime  losartan 50 milliGRAM(s) Oral daily  meperidine     Injectable 25 milliGRAM(s) IV Push once  polyethylene glycol 3350 17 Gram(s) Oral daily  senna 2 Tablet(s) Oral at bedtime  sodium chloride 0.9%. 1000 milliLiter(s) (20 mL/Hr) IV Continuous <Continuous>    MEDICATIONS  (PRN):  dextrose Oral Gel 15 Gram(s) Oral once PRN Blood Glucose LESS THAN 70 milliGRAM(s)/deciliter  fentaNYL    Injectable 25 MICROGram(s) IV Push every 4 hours PRN Severe Pain (7 - 10)  HYDROmorphone  Injectable 0.5 milliGRAM(s) IV Push every 6 hours PRN Breakthrough Pain  oxyCODONE    IR 5 milliGRAM(s) Oral every 4 hours PRN Moderate Pain (4 - 6)  oxyCODONE    IR 10 milliGRAM(s) Oral every 4 hours PRN Severe Pain (7 - 10)    RADIOLOGY:  Chest X-ray Reviewed    REVIEW OF SYSTEMS:  CONSTITUTIONAL: [X] all negative; [ ] weakness, [ ] fevers, [ ] chills  EYES/ENT: [X] all negative; [ ] visual changes, [ ] vertigo, [ ] throat pain   NECK: [X] all negative; [ ] pain, [ ] stiffness  RESPIRATORY: [] all negative, [ ] cough, [ ] wheezing, [ ] hemoptysis, [ ] shortness of breath  CARDIOVASCULAR: [] all negative; [ ] chest pain, [ ] palpitations, [ ] orthopnea  GASTROINTESTINAL: [X] all negative; [ ]abdominal pain, [ ] nausea, [ ] vomiting, [ ] hematemesis, [ ] diarrhea, [ ] constipation, [ ] melena, [ ] hematochezia.  GENITOURINARY: [X] all negative; [ ] dysuria, [ ] frequency, [ ] hematuria  NEUROLOGICAL: [X] all negative; [ ] numbness, [ ] weakness  SKIN: [X] all negative; [ ] itching, [ ] burning, [ ] rashes, [ ] lesions   All other review of systems is negative unless indicated above.  [  ] Unable to assess ROS because     PHYSICAL EXAM:          CONSTITUTIONAL: in no acute distress.   	SKIN: warm, dry  	HEAD: Normocephalic; atraumatic.  	EYES: PERRL, EOM, no conj injection, sclera clear  	ENT: No nasal discharge; airway clear.  	NECK: Supple; non tender.   	CARD: S1, S2 normal; no murmurs, gallops, or rubs. Regular rate and rhythm.  no carotid bruits  	RESP: CTA B/L; good air movement No wheezes, rales or rhonchi.  	ABD: Soft, not tender, not distended,  no rebound or guarding, bowel sounds present  	EXT: Normal ROM.  No clubbing, cyanosis or edema.   warm and well perfused.  pulses palpable  	NEURO: Alert, awake, motor 5/5 R, 5/5 L    Assessment  73 y/o female sp MVR    Plan:    Neuro:  Multimodal pain management  Tylenol, Lidoderm patch, gabapentin, opiates as needed  Normal neuro status in the post op period    CV:  S/P MVR  Maintain MAP > 65  ASA, statin, lasix BID   CXR congested   Remove chest tubes after walking today  Intermittent high degree block with bradycardia and first degree AV block - pacer at 60/mt, EP consulted     Resp:  continue ventilation to keep spo2 > 92 and pH b/w 7.35-7.45  Supplemental oxygen to maintain sats > 92%  Continuous pulse oximetry monitoring  IS / Chest PT  Nebulizers as needed    GI:  Diet   PUD ppx per protocol    Renal  High risk for JAGDEEP post surgery  Monitor UOP, lytes, creatinine  Diuretics as needed for negative fluid balance  Keep K > 4, Mg > 2    Endo:  Tight glucose control per CTICU protocol  Insulin gtt off  / SSI as needed    Heme:  s/p Acute blood loss anemia post surgery  High risk for thrombocytopenia  DVT prophylaxis hep sq    ID:  Perioperative prophylactic abx per protocol  Monitor fever curve and WBC count  Remove TLC when no longer indicated    Upon my evaluation, the above patient has a high probabillity of imminent or life threatening deterioration due to a high risk for Shock, Cardiogenic shock, arrythmias, acute blood loss, acute kidney injury and acute pulmonary insufficiency which required my direct attention, intervention, and personal management.  Total time was 55 minutes which includes review of radiology results, ordering, interpreting and reviewing diagnostic studies / lab tests with immediate assessment and treatment, consultant collaboration on findings and treatment options, monitoring for potential decompensation, obtaining history from family, EMT, nursing home staff and/or treating physicians; directing the titration of pressors, oxygen support devices, fluid resucitation, interpretation of cardiac output measurements, interpretation of radiological assessments, interpretation of EKG / rhythm strips, telemetry.  This time did not overlap with the management of other patients or performing separately reportable procedures.      Management of this patient was discussed with the CT surgery attending and mid-level providers.    I ackknowledge the use of copied documentation.  All copy forward documentation is my own and has been reviewed and revised as appropriate.  If no changes were made, it is because that information remains the same.

## 2025-05-14 NOTE — DIETITIAN INITIAL EVALUATION ADULT - PERTINENT MEDS FT
MEDICATIONS  (STANDING):  albuterol/ipratropium for Nebulization 3 milliLiter(s) Nebulizer every 6 hours  ascorbic acid 500 milliGRAM(s) Oral two times a day  aspirin enteric coated 81 milliGRAM(s) Oral daily  atorvastatin 40 milliGRAM(s) Oral at bedtime  bisacodyl Suppository 10 milliGRAM(s) Rectal once  ceFAZolin   IVPB 2000 milliGRAM(s) IV Intermittent every 8 hours  chlorhexidine 2% Cloths 1 Application(s) Topical daily  dextrose 5%. 1000 milliLiter(s) (50 mL/Hr) IV Continuous <Continuous>  dextrose 5%. 1000 milliLiter(s) (100 mL/Hr) IV Continuous <Continuous>  dextrose 50% Injectable 50 milliLiter(s) IV Push every 15 minutes  dextrose 50% Injectable 25 milliLiter(s) IV Push every 15 minutes  famotidine    Tablet 20 milliGRAM(s) Oral two times a day  furosemide   Injectable 20 milliGRAM(s) IV Push every 12 hours  gabapentin 100 milliGRAM(s) Oral every 8 hours  glucagon  Injectable 1 milliGRAM(s) IntraMuscular once  heparin   Injectable 5000 Unit(s) SubCutaneous every 8 hours  insulin lispro (ADMELOG) corrective regimen sliding scale   SubCutaneous three times a day before meals  insulin lispro (ADMELOG) corrective regimen sliding scale   SubCutaneous at bedtime  losartan 50 milliGRAM(s) Oral daily  meperidine     Injectable 25 milliGRAM(s) IV Push once  polyethylene glycol 3350 17 Gram(s) Oral daily  senna 2 Tablet(s) Oral at bedtime  sodium chloride 0.9%. 1000 milliLiter(s) (20 mL/Hr) IV Continuous <Continuous>    MEDICATIONS  (PRN):  dextrose Oral Gel 15 Gram(s) Oral once PRN Blood Glucose LESS THAN 70 milliGRAM(s)/deciliter  fentaNYL    Injectable 25 MICROGram(s) IV Push every 4 hours PRN Severe Pain (7 - 10)  HYDROmorphone  Injectable 0.5 milliGRAM(s) IV Push every 6 hours PRN Breakthrough Pain  oxyCODONE    IR 5 milliGRAM(s) Oral every 4 hours PRN Moderate Pain (4 - 6)  oxyCODONE    IR 10 milliGRAM(s) Oral every 4 hours PRN Severe Pain (7 - 10)

## 2025-05-14 NOTE — CONSULT NOTE ADULT - SUBJECTIVE AND OBJECTIVE BOX
EP HPI:  73 yo F with PMHX of HTN, HLD, MR, COPD s/p MV repair . On post op day #2 in CTU, pt noted to have increasing bradycardic episodes with HR in upper 40s.      PAST MEDICAL & SURGICAL HISTORY  Gastric ulcer    Benign essential HTN    Hyperlipidemia    Mitral regurgitation    COPD with hypoxia    Acute respiratory failure with hypoxia    S/P bunionectomy    S/P cardiac cath        FAMILY HISTORY:  FAMILY HISTORY:  No pertinent family history in first degree relatives    ALLERGIES:  No Known Allergies      MEDICATIONS:  albuterol/ipratropium for Nebulization 3 milliLiter(s) Nebulizer every 6 hours  ascorbic acid 500 milliGRAM(s) Oral two times a day  aspirin enteric coated 81 milliGRAM(s) Oral daily  atorvastatin 40 milliGRAM(s) Oral at bedtime  bisacodyl Suppository 10 milliGRAM(s) Rectal once  chlorhexidine 2% Cloths 1 Application(s) Topical daily  dextrose 5%. 1000 milliLiter(s) (100 mL/Hr) IV Continuous <Continuous>  dextrose 5%. 1000 milliLiter(s) (50 mL/Hr) IV Continuous <Continuous>  dextrose 50% Injectable 50 milliLiter(s) IV Push every 15 minutes  dextrose 50% Injectable 25 milliLiter(s) IV Push every 15 minutes  famotidine    Tablet 20 milliGRAM(s) Oral two times a day  furosemide   Injectable 20 milliGRAM(s) IV Push every 12 hours  gabapentin 100 milliGRAM(s) Oral every 8 hours  glucagon  Injectable 1 milliGRAM(s) IntraMuscular once  heparin   Injectable 5000 Unit(s) SubCutaneous every 8 hours  insulin lispro (ADMELOG) corrective regimen sliding scale   SubCutaneous three times a day before meals  insulin lispro (ADMELOG) corrective regimen sliding scale   SubCutaneous at bedtime  losartan 50 milliGRAM(s) Oral daily  meperidine     Injectable 25 milliGRAM(s) IV Push once  polyethylene glycol 3350 17 Gram(s) Oral daily  senna 2 Tablet(s) Oral at bedtime  sodium chloride 0.9%. 1000 milliLiter(s) (20 mL/Hr) IV Continuous <Continuous>    PRN:  dextrose Oral Gel 15 Gram(s) Oral once PRN  fentaNYL    Injectable 25 MICROGram(s) IV Push every 4 hours PRN  HYDROmorphone  Injectable 0.5 milliGRAM(s) IV Push every 6 hours PRN  oxyCODONE    IR 5 milliGRAM(s) Oral every 4 hours PRN  oxyCODONE    IR 10 milliGRAM(s) Oral every 4 hours PRN      HOME MEDICATIONS:  Home Medications:  aspirin 81 mg oral tablet: orally once a day (12 May 2025 06:55)  aspirin 81 mg po q day:  (12 May 2025 06:55)  Famotidine: 40 milligram(s) orally once a day (12 May 2025 06:55)  loratadine po q day:  (12 May 2025 06:55)  losartan hctz 50/12.5 mg po q day :  (12 May 2025 06:55)  Meloxicam: 15 milligram(s) orally once a day (12 May 2025 06:55)      VITALS:   T(F): 97.9 ( @ 04:00), Max: 99.9 (12 @ 20:00)  HR: 69 (- @ 10:00) (61 - 94)  BP: 105/62 (- @ 10:00) (77/51 - 129/60)  BP(mean): 79 (- @ 10:00) (59 - 88)  RR: 19 (- @ 10:00) (7 - 26)  SpO2: 95% (05-14 @ 10:00) (92% - 99%)    I&O's Summary    13 May 2025 07:01  -  14 May 2025 07:00  --------------------------------------------------------  IN: 1889.6 mL / OUT: 1290 mL / NET: 599.6 mL    14 May 2025 07:01  -  14 May 2025 14:57  --------------------------------------------------------  IN: 240 mL / OUT: 600 mL / NET: -360 mL      PHYSICAL EXAM:  General: Not in distress  Cardio: regular, S1, S2  Pulm: Clear air entry bilaterally, no wheezing, rales, rhonchi  Abdomen: Soft, non-tender, non-distended  Extremities: No edema in bilateral LE  Neuro: A&O x3    LABS:                        9.6    11.91 )-----------( 158      ( 14 May 2025 01:34 )             28.1         140  |  108  |  25[H]  ----------------------------<  128[H]  4.3   |  20  |  0.8    Ca    8.1[L]      14 May 2025 01:34  Mg     2.3         TPro  5.2[L]  /  Alb  3.8  /  TBili  0.4  /  DBili  x   /  AST  42[H]  /  ALT  17  /  AlkPhos  41      PT/INR - ( 14 May 2025 01:34 )   PT: 13.00 sec;   INR: 1.10 ratio         PTT - ( 14 May 2025 01:34 )  PTT:27.1 sec          Troponin trend:            EC Lead ECG:   Ventricular Rate 69 BPM    Atrial Rate 69 BPM    P-R Interval 274 ms    QRS Duration 90 ms    Q-T Interval 432 ms    QTC Calculation(Bazett) 462 ms    P Axis 34 degrees    R Axis 40 degrees    T Axis 51 degrees    Diagnosis Line Sinus rhythm with 1st degree A-V block  Otherwise normal ECG    Confirmed by Christofer Kinney ([id number]) on 2025 11:12:42 AM ( @ 08:43)      TELEMETRY EVENTS:  2:1 AVB with Lupe

## 2025-05-14 NOTE — DIETITIAN INITIAL EVALUATION ADULT - NAME AND PHONE
Luciana [phone] or TEAMS    Nutrition Interventions: meals, snacks, supplements; Nutrition Monitoring: Diet order, PO intake, weights, labs, NFPF, body composition, BM and tolerance to medical food supplements

## 2025-05-14 NOTE — DIETITIAN INITIAL EVALUATION ADULT - NS FNS DIET ORDER
Diet, Consistent Carbohydrate/No Snacks:   DASH/TLC {Sodium & Cholesterol Restricted} (DASH) (05-13-25 @ 14:33)

## 2025-05-14 NOTE — DIETITIAN INITIAL EVALUATION ADULT - NSFNSGIIOFT_GEN_A_CORE
Last BM on 5/11 per pt    05-13-25 @ 07:01  -  05-14-25 @ 07:00  --------------------------------------------------------  OUT:    Chest Tube (mL): 220 mL  Total OUT: 220 mL    Total NET: -220 mL      05-14-25 @ 07:01  -  05-14-25 @ 13:03  --------------------------------------------------------  OUT:    Chest Tube (mL): 0 mL  Total OUT: 0 mL    Total NET: 0 mL

## 2025-05-15 LAB
ALBUMIN SERPL ELPH-MCNC: 3.6 G/DL — SIGNIFICANT CHANGE UP (ref 3.5–5.2)
ALBUMIN SERPL ELPH-MCNC: 3.7 G/DL — SIGNIFICANT CHANGE UP (ref 3.5–5.2)
ALP SERPL-CCNC: 45 U/L — SIGNIFICANT CHANGE UP (ref 30–115)
ALP SERPL-CCNC: 58 U/L — SIGNIFICANT CHANGE UP (ref 30–115)
ALT FLD-CCNC: 9 U/L — SIGNIFICANT CHANGE UP (ref 0–41)
ALT FLD-CCNC: 9 U/L — SIGNIFICANT CHANGE UP (ref 0–41)
ANION GAP SERPL CALC-SCNC: 10 MMOL/L — SIGNIFICANT CHANGE UP (ref 7–14)
ANION GAP SERPL CALC-SCNC: 11 MMOL/L — SIGNIFICANT CHANGE UP (ref 7–14)
AST SERPL-CCNC: 22 U/L — SIGNIFICANT CHANGE UP (ref 0–41)
AST SERPL-CCNC: 23 U/L — SIGNIFICANT CHANGE UP (ref 0–41)
BASOPHILS # BLD AUTO: 0.04 K/UL — SIGNIFICANT CHANGE UP (ref 0–0.2)
BASOPHILS NFR BLD AUTO: 0.4 % — SIGNIFICANT CHANGE UP (ref 0–1)
BILIRUB SERPL-MCNC: 0.3 MG/DL — SIGNIFICANT CHANGE UP (ref 0.2–1.2)
BILIRUB SERPL-MCNC: 0.4 MG/DL — SIGNIFICANT CHANGE UP (ref 0.2–1.2)
BUN SERPL-MCNC: 22 MG/DL — HIGH (ref 10–20)
BUN SERPL-MCNC: 25 MG/DL — HIGH (ref 10–20)
CALCIUM SERPL-MCNC: 8.3 MG/DL — LOW (ref 8.4–10.5)
CALCIUM SERPL-MCNC: 8.8 MG/DL — SIGNIFICANT CHANGE UP (ref 8.4–10.5)
CHLORIDE SERPL-SCNC: 102 MMOL/L — SIGNIFICANT CHANGE UP (ref 98–110)
CHLORIDE SERPL-SCNC: 105 MMOL/L — SIGNIFICANT CHANGE UP (ref 98–110)
CO2 SERPL-SCNC: 23 MMOL/L — SIGNIFICANT CHANGE UP (ref 17–32)
CO2 SERPL-SCNC: 24 MMOL/L — SIGNIFICANT CHANGE UP (ref 17–32)
CREAT SERPL-MCNC: 0.8 MG/DL — SIGNIFICANT CHANGE UP (ref 0.7–1.5)
CREAT SERPL-MCNC: 0.9 MG/DL — SIGNIFICANT CHANGE UP (ref 0.7–1.5)
EGFR: 68 ML/MIN/1.73M2 — SIGNIFICANT CHANGE UP
EGFR: 68 ML/MIN/1.73M2 — SIGNIFICANT CHANGE UP
EGFR: 78 ML/MIN/1.73M2 — SIGNIFICANT CHANGE UP
EGFR: 78 ML/MIN/1.73M2 — SIGNIFICANT CHANGE UP
EOSINOPHIL # BLD AUTO: 0.04 K/UL — SIGNIFICANT CHANGE UP (ref 0–0.7)
EOSINOPHIL NFR BLD AUTO: 0.4 % — SIGNIFICANT CHANGE UP (ref 0–8)
GLUCOSE BLDC GLUCOMTR-MCNC: 118 MG/DL — HIGH (ref 70–99)
GLUCOSE SERPL-MCNC: 114 MG/DL — HIGH (ref 70–99)
GLUCOSE SERPL-MCNC: 116 MG/DL — HIGH (ref 70–99)
HCT VFR BLD CALC: 28.4 % — LOW (ref 37–47)
HGB BLD-MCNC: 9.6 G/DL — LOW (ref 12–16)
IMM GRANULOCYTES NFR BLD AUTO: 0.5 % — HIGH (ref 0.1–0.3)
LYMPHOCYTES # BLD AUTO: 1.66 K/UL — SIGNIFICANT CHANGE UP (ref 1.2–3.4)
LYMPHOCYTES # BLD AUTO: 16.6 % — LOW (ref 20.5–51.1)
MAGNESIUM SERPL-MCNC: 1.8 MG/DL — SIGNIFICANT CHANGE UP (ref 1.8–2.4)
MCHC RBC-ENTMCNC: 28.4 PG — SIGNIFICANT CHANGE UP (ref 27–31)
MCHC RBC-ENTMCNC: 33.8 G/DL — SIGNIFICANT CHANGE UP (ref 32–37)
MCV RBC AUTO: 84 FL — SIGNIFICANT CHANGE UP (ref 81–99)
MONOCYTES # BLD AUTO: 0.86 K/UL — HIGH (ref 0.1–0.6)
MONOCYTES NFR BLD AUTO: 8.6 % — SIGNIFICANT CHANGE UP (ref 1.7–9.3)
NEUTROPHILS # BLD AUTO: 7.38 K/UL — HIGH (ref 1.4–6.5)
NEUTROPHILS NFR BLD AUTO: 73.5 % — SIGNIFICANT CHANGE UP (ref 42.2–75.2)
NRBC BLD AUTO-RTO: 0 /100 WBCS — SIGNIFICANT CHANGE UP (ref 0–0)
PLATELET # BLD AUTO: 153 K/UL — SIGNIFICANT CHANGE UP (ref 130–400)
PMV BLD: 10 FL — SIGNIFICANT CHANGE UP (ref 7.4–10.4)
POTASSIUM SERPL-MCNC: 3.9 MMOL/L — SIGNIFICANT CHANGE UP (ref 3.5–5)
POTASSIUM SERPL-MCNC: 4.4 MMOL/L — SIGNIFICANT CHANGE UP (ref 3.5–5)
POTASSIUM SERPL-SCNC: 3.9 MMOL/L — SIGNIFICANT CHANGE UP (ref 3.5–5)
POTASSIUM SERPL-SCNC: 4.4 MMOL/L — SIGNIFICANT CHANGE UP (ref 3.5–5)
PROT SERPL-MCNC: 5.7 G/DL — LOW (ref 6–8)
PROT SERPL-MCNC: 5.7 G/DL — LOW (ref 6–8)
RBC # BLD: 3.38 M/UL — LOW (ref 4.2–5.4)
RBC # FLD: 15.8 % — HIGH (ref 11.5–14.5)
SODIUM SERPL-SCNC: 137 MMOL/L — SIGNIFICANT CHANGE UP (ref 135–146)
SODIUM SERPL-SCNC: 138 MMOL/L — SIGNIFICANT CHANGE UP (ref 135–146)
WBC # BLD: 10.03 K/UL — SIGNIFICANT CHANGE UP (ref 4.8–10.8)
WBC # FLD AUTO: 10.03 K/UL — SIGNIFICANT CHANGE UP (ref 4.8–10.8)

## 2025-05-15 PROCEDURE — 71045 X-RAY EXAM CHEST 1 VIEW: CPT | Mod: 26,77

## 2025-05-15 PROCEDURE — 99291 CRITICAL CARE FIRST HOUR: CPT | Mod: 25

## 2025-05-15 PROCEDURE — 93010 ELECTROCARDIOGRAM REPORT: CPT

## 2025-05-15 PROCEDURE — 71045 X-RAY EXAM CHEST 1 VIEW: CPT | Mod: 26

## 2025-05-15 PROCEDURE — 32555 ASPIRATE PLEURA W/ IMAGING: CPT | Mod: RT

## 2025-05-15 RX ORDER — FUROSEMIDE 10 MG/ML
20 INJECTION INTRAMUSCULAR; INTRAVENOUS ONCE
Refills: 0 | Status: COMPLETED | OUTPATIENT
Start: 2025-05-15 | End: 2025-05-15

## 2025-05-15 RX ORDER — MAGNESIUM SULFATE 500 MG/ML
2 SYRINGE (ML) INJECTION ONCE
Refills: 0 | Status: COMPLETED | OUTPATIENT
Start: 2025-05-15 | End: 2025-05-15

## 2025-05-15 RX ORDER — ACETAMINOPHEN 500 MG/5ML
1000 LIQUID (ML) ORAL ONCE
Refills: 0 | Status: COMPLETED | OUTPATIENT
Start: 2025-05-15 | End: 2025-05-15

## 2025-05-15 RX ORDER — FUROSEMIDE 10 MG/ML
20 INJECTION INTRAMUSCULAR; INTRAVENOUS ONCE
Refills: 0 | Status: DISCONTINUED | OUTPATIENT
Start: 2025-05-15 | End: 2025-05-18

## 2025-05-15 RX ADMIN — IPRATROPIUM BROMIDE AND ALBUTEROL SULFATE 3 MILLILITER(S): .5; 2.5 SOLUTION RESPIRATORY (INHALATION) at 08:19

## 2025-05-15 RX ADMIN — Medication 20 MILLIGRAM(S): at 06:08

## 2025-05-15 RX ADMIN — HEPARIN SODIUM 5000 UNIT(S): 1000 INJECTION INTRAVENOUS; SUBCUTANEOUS at 21:25

## 2025-05-15 RX ADMIN — Medication 1000 MILLIGRAM(S): at 09:55

## 2025-05-15 RX ADMIN — Medication 25 GRAM(S): at 09:25

## 2025-05-15 RX ADMIN — Medication 400 MILLIGRAM(S): at 09:25

## 2025-05-15 RX ADMIN — FUROSEMIDE 20 MILLIGRAM(S): 10 INJECTION INTRAMUSCULAR; INTRAVENOUS at 14:44

## 2025-05-15 RX ADMIN — IPRATROPIUM BROMIDE AND ALBUTEROL SULFATE 3 MILLILITER(S): .5; 2.5 SOLUTION RESPIRATORY (INHALATION) at 02:38

## 2025-05-15 RX ADMIN — Medication 2 TABLET(S): at 21:24

## 2025-05-15 RX ADMIN — GABAPENTIN 100 MILLIGRAM(S): 400 CAPSULE ORAL at 21:24

## 2025-05-15 RX ADMIN — Medication 40 MILLIEQUIVALENT(S): at 11:26

## 2025-05-15 RX ADMIN — FUROSEMIDE 20 MILLIGRAM(S): 10 INJECTION INTRAMUSCULAR; INTRAVENOUS at 06:07

## 2025-05-15 RX ADMIN — POLYETHYLENE GLYCOL 3350 17 GRAM(S): 17 POWDER, FOR SOLUTION ORAL at 11:26

## 2025-05-15 RX ADMIN — Medication 500 MILLIGRAM(S): at 06:08

## 2025-05-15 RX ADMIN — HEPARIN SODIUM 5000 UNIT(S): 1000 INJECTION INTRAVENOUS; SUBCUTANEOUS at 06:07

## 2025-05-15 RX ADMIN — OXYCODONE HYDROCHLORIDE 5 MILLIGRAM(S): 30 TABLET ORAL at 00:00

## 2025-05-15 RX ADMIN — GABAPENTIN 100 MILLIGRAM(S): 400 CAPSULE ORAL at 14:43

## 2025-05-15 RX ADMIN — IPRATROPIUM BROMIDE AND ALBUTEROL SULFATE 3 MILLILITER(S): .5; 2.5 SOLUTION RESPIRATORY (INHALATION) at 21:20

## 2025-05-15 RX ADMIN — Medication 81 MILLIGRAM(S): at 11:27

## 2025-05-15 RX ADMIN — FUROSEMIDE 20 MILLIGRAM(S): 10 INJECTION INTRAMUSCULAR; INTRAVENOUS at 17:49

## 2025-05-15 RX ADMIN — Medication 25 MICROGRAM(S): at 02:00

## 2025-05-15 RX ADMIN — Medication 20 MILLIGRAM(S): at 17:50

## 2025-05-15 RX ADMIN — Medication 500 MILLIGRAM(S): at 17:50

## 2025-05-15 RX ADMIN — HEPARIN SODIUM 5000 UNIT(S): 1000 INJECTION INTRAVENOUS; SUBCUTANEOUS at 14:44

## 2025-05-15 RX ADMIN — Medication 25 MICROGRAM(S): at 02:15

## 2025-05-15 RX ADMIN — ATORVASTATIN CALCIUM 40 MILLIGRAM(S): 80 TABLET, FILM COATED ORAL at 21:24

## 2025-05-15 RX ADMIN — OXYCODONE HYDROCHLORIDE 5 MILLIGRAM(S): 30 TABLET ORAL at 21:24

## 2025-05-15 RX ADMIN — Medication 1 APPLICATION(S): at 11:26

## 2025-05-15 RX ADMIN — IPRATROPIUM BROMIDE AND ALBUTEROL SULFATE 3 MILLILITER(S): .5; 2.5 SOLUTION RESPIRATORY (INHALATION) at 14:14

## 2025-05-15 RX ADMIN — GABAPENTIN 100 MILLIGRAM(S): 400 CAPSULE ORAL at 06:08

## 2025-05-15 NOTE — PROGRESS NOTE ADULT - SUBJECTIVE AND OBJECTIVE BOX
OPERATIVE PROCEDURE(s):                POD # 3                      74yFemale  SURGEON(s): KALPANA Guillaume  SUBJECTIVE ASSESSMENT: pt seen and examined.   Vital Signs Last 24 Hrs  T(F): 98.2 (15 May 2025 08:00), Max: 98.2 (15 May 2025 08:00)  HR: 75 (15 May 2025 08:00) (61 - 84)  BP: 116/60 (14 May 2025 11:00) (105/62 - 116/60)  BP(mean): 82 (14 May 2025 11:00) (79 - 82)  ABP: 121/50 (15 May 2025 08:00) (102/46 - 152/66)  ABP(mean): 71 (15 May 2025 08:00)  RR: 19 (15 May 2025 08:00) (14 - 26)  SpO2: 98% (15 May 2025 08:00) (95% - 99%)      I&O's Detail    14 May 2025 07:01  -  15 May 2025 07:00  --------------------------------------------------------  IN:    IV PiggyBack: 100 mL    Oral Fluid: 1080 mL  Total IN: 1180 mL    OUT:    Chest Tube (mL): 0 mL    Indwelling Catheter - Urethral (mL): 720 mL    Voided (mL): 1200 mL  Total OUT: 1920 mL        Net:   I&O's Detail    13 May 2025 07:01  -  14 May 2025 07:00  --------------------------------------------------------  Total NET: 599.6 mL      14 May 2025 07:01  -  15 May 2025 07:00  --------------------------------------------------------  Total NET: -740 mL        CAPILLARY BLOOD GLUCOSE      POCT Blood Glucose.: 118 mg/dL (15 May 2025 07:37)  POCT Blood Glucose.: 127 mg/dL (14 May 2025 21:22)  POCT Blood Glucose.: 119 mg/dL (14 May 2025 14:38)  POCT Blood Glucose.: 145 mg/dL (14 May 2025 11:29)    Physical Exam:  General: NAD; A&Ox3/Patient is intubated and sedated  Cardiac: S1/S2, RRR, no murmur, no rubs  Lungs: unlabored respirations, CTA b/l, no wheeze, no rales, no crackles  Abdomen: Soft/NT/ND; positive bowel sounds x 4  Sternum: Intact, no click, incision healing well with no drainage  Incisions: Incisions clean/dry/intact  Extremities: No edema b/l lower extremities; good capillary refill; no cyanosis; palpable 1+ pedal pulses b/l    Central Venous Catheter: Yes[]  No[] , If Yes indication:           Day #  Hunter Catheter: Yes  [] , No  [] , If yes indication:                      Day #  NGT: Yes [] No [] ,    If Yes Placement:                                     Day #  EPICARDIAL WIRES:  [] YES [] NO                                              Day #  BOWEL MOVEMENT:  [] YES [] NO, If No, Timing since last BM:      Day #  CHEST TUBE(Left/Right):  [] YES [] NO, If yes -  AIR LEAKS:  [] YES [] NO        LABS:                        9.6[L]  10.03 )-----------( 153      ( 15 May 2025 02:25 )             28.4[L]                        9.6[L]  11.91[H] )-----------( 158      ( 14 May 2025 01:34 )             28.1[L]    05-15    138  |  105  |  22[H]  ----------------------------<  114[H]  3.9   |  23  |  0.8  05-14    137  |  105  |  26[H]  ----------------------------<  121[H]  4.1   |  22  |  0.9    Ca    8.3[L]      15 May 2025 02:25  Phos  2.4     05-14  Mg     1.8     05-15    TPro  5.7[L] [6.0 - 8.0]  /  Alb  3.6 [3.5 - 5.2]  /  TBili  0.4 [0.2 - 1.2]  /  DBili  x   /  AST  22 [0 - 41]  /  ALT  9 [0 - 41]  /  AlkPhos  45 [30 - 115]  05-15    PT/INR - ( 14 May 2025 01:34 )   PT: ;   INR: 1.10 ratio         PTT - ( 14 May 2025 01:34 )  PTT:27.1 sec  Urinalysis Basic - ( 15 May 2025 02:25 )    Color: x / Appearance: x / SG: x / pH: x  Gluc: 114 mg/dL / Ketone: x  / Bili: x / Urobili: x   Blood: x / Protein: x / Nitrite: x   Leuk Esterase: x / RBC: x / WBC x   Sq Epi: x / Non Sq Epi: x / Bacteria: x      ABG - ( 14 May 2025 05:15 )  pH: 7.41  /  pCO2: 33    /  pO2: 65    / HCO3: 21    / Base Excess: -3.1  /  SaO2: 94.3  /  LA: 1.4              RADIOLOGY & ADDITIONAL TESTS:  CXR:  EKG:  MEDICATIONS  (STANDING):  albuterol/ipratropium for Nebulization 3 milliLiter(s) Nebulizer every 6 hours  ascorbic acid 500 milliGRAM(s) Oral two times a day  aspirin enteric coated 81 milliGRAM(s) Oral daily  atorvastatin 40 milliGRAM(s) Oral at bedtime  bisacodyl Suppository 10 milliGRAM(s) Rectal once  chlorhexidine 2% Cloths 1 Application(s) Topical daily  dextrose 5%. 1000 milliLiter(s) (50 mL/Hr) IV Continuous <Continuous>  dextrose 5%. 1000 milliLiter(s) (100 mL/Hr) IV Continuous <Continuous>  dextrose 50% Injectable 50 milliLiter(s) IV Push every 15 minutes  dextrose 50% Injectable 25 milliLiter(s) IV Push every 15 minutes  famotidine    Tablet 20 milliGRAM(s) Oral two times a day  furosemide   Injectable 20 milliGRAM(s) IV Push every 12 hours  gabapentin 100 milliGRAM(s) Oral every 8 hours  glucagon  Injectable 1 milliGRAM(s) IntraMuscular once  heparin   Injectable 5000 Unit(s) SubCutaneous every 8 hours  insulin lispro (ADMELOG) corrective regimen sliding scale   SubCutaneous three times a day before meals  insulin lispro (ADMELOG) corrective regimen sliding scale   SubCutaneous at bedtime  meperidine     Injectable 25 milliGRAM(s) IV Push once  polyethylene glycol 3350 17 Gram(s) Oral daily  senna 2 Tablet(s) Oral at bedtime  sodium chloride 0.9%. 1000 milliLiter(s) (20 mL/Hr) IV Continuous <Continuous>    MEDICATIONS  (PRN):  dextrose Oral Gel 15 Gram(s) Oral once PRN Blood Glucose LESS THAN 70 milliGRAM(s)/deciliter  fentaNYL    Injectable 25 MICROGram(s) IV Push every 4 hours PRN Severe Pain (7 - 10)  oxyCODONE    IR 5 milliGRAM(s) Oral every 4 hours PRN Moderate Pain (4 - 6)  oxyCODONE    IR 10 milliGRAM(s) Oral every 4 hours PRN Severe Pain (7 - 10)    HEPARIN:  [] YES [] NO  Dose: XX UNITS/HR UNITS Q8H  LOVENOX:[] YES [] NO  Dose: XX mg Q24H  COUMADIN: []  YES [] NO  Dose: XX mg  Q24H  SCD's: YES b/l  GI Prophylaxis: Protonix [], Pepcid [], None [], (Contra-indication:.....)    Post-Op Beta-Blockers: Yes [], No[], If No, then contraindication:  Post-Op Aspirin: Yes [],  No [], If No, then contraindication:  Post-Op Statin: Yes [], No[], If No, then contraindication:  Allergies    No Known Allergies    Intolerances      Ambulation/Activity Status:    Assessment/Plan:  74y Female status-post .....  - Case and plan discussed with CTU Intensivist and CT Surgeon - Dr. Guillaume/Gabriella/Jean  - Continue CTU supportive care    - Continue DVT/GI prophylaxis  - Incentive Spirometry 10 times an hour  - Continue to advance physical activity as tolerated and continue PT/OT as directed  1. CAD: Continue ASA, statin, BB  2. HTN:   3. A. Fib:   4. COPD/Hypoxia:   5. DM/Glucose Control:     Social Service Disposition:     OPERATIVE PROCEDURE(s):   mvr             POD # 3                      74yFemale  SURGEON(s): KALPANA Guillaume  SUBJECTIVE ASSESSMENT: pt seen and examined. no acute complaints at this time  Vital Signs Last 24 Hrs  T(F): 98.2 (15 May 2025 08:00), Max: 98.2 (15 May 2025 08:00)  HR: 75 (15 May 2025 08:00) (61 - 84)  BP: 116/60 (14 May 2025 11:00) (105/62 - 116/60)  BP(mean): 82 (14 May 2025 11:00) (79 - 82)  ABP: 121/50 (15 May 2025 08:00) (102/46 - 152/66)  ABP(mean): 71 (15 May 2025 08:00)  RR: 19 (15 May 2025 08:00) (14 - 26)  SpO2: 98% (15 May 2025 08:00) (95% - 99%)      I&O's Detail    14 May 2025 07:01  -  15 May 2025 07:00  --------------------------------------------------------  IN:    IV PiggyBack: 100 mL    Oral Fluid: 1080 mL  Total IN: 1180 mL    OUT:    Chest Tube (mL): 0 mL    Indwelling Catheter - Urethral (mL): 720 mL    Voided (mL): 1200 mL  Total OUT: 1920 mL        Net:   I&O's Detail    13 May 2025 07:01  -  14 May 2025 07:00  --------------------------------------------------------  Total NET: 599.6 mL      14 May 2025 07:01  -  15 May 2025 07:00  --------------------------------------------------------  Total NET: -740 mL        CAPILLARY BLOOD GLUCOSE      POCT Blood Glucose.: 118 mg/dL (15 May 2025 07:37)  POCT Blood Glucose.: 127 mg/dL (14 May 2025 21:22)  POCT Blood Glucose.: 119 mg/dL (14 May 2025 14:38)  POCT Blood Glucose.: 145 mg/dL (14 May 2025 11:29)    Physical Exam:  General: NAD; A&Ox3  Cardiac: S1/S2, RRR, no murmur, no rubs  Lungs: unlabored respirations, CTA b/l, no wheeze, no rales, no crackles  Abdomen: Soft/NT/ND; positive bowel sounds x 4  Sternum: Intact, no click, incision healing well with no drainage  Incisions: Incisions clean/dry/intact  Extremities: No edema b/l lower extremities; good capillary refill; no cyanosis; palpable 1+ pedal pulses b/l    Central Venous Catheter: Yes: critical illness, intravenous access  Day # 3  Hunter Catheter: Yes: critical illness; monitor strict i/o's                    Day # 3  EPICARDIAL WIRES:  YES                                                                         Day # 3  BOWEL MOVEMENT:  [] YES [X NO, If No, Timing since last BM Day #5/12/25        LABS:                        9.6[L]  10.03 )-----------( 153      ( 15 May 2025 02:25 )             28.4[L]                        9.6[L]  11.91[H] )-----------( 158      ( 14 May 2025 01:34 )             28.1[L]    05-15    138  |  105  |  22[H]  ----------------------------<  114[H]  3.9   |  23  |  0.8  05-14    137  |  105  |  26[H]  ----------------------------<  121[H]  4.1   |  22  |  0.9    Ca    8.3[L]      15 May 2025 02:25  Phos  2.4     05-14  Mg     1.8     05-15    TPro  5.7[L] [6.0 - 8.0]  /  Alb  3.6 [3.5 - 5.2]  /  TBili  0.4 [0.2 - 1.2]  /  DBili  x   /  AST  22 [0 - 41]  /  ALT  9 [0 - 41]  /  AlkPhos  45 [30 - 115]  05-15    PT/INR - ( 14 May 2025 01:34 )   PT: ;   INR: 1.10 ratio         PTT - ( 14 May 2025 01:34 )  PTT:27.1 sec  Urinalysis Basic - ( 15 May 2025 02:25 )    Color: x / Appearance: x / SG: x / pH: x  Gluc: 114 mg/dL / Ketone: x  / Bili: x / Urobili: x   Blood: x / Protein: x / Nitrite: x   Leuk Esterase: x / RBC: x / WBC x   Sq Epi: x / Non Sq Epi: x / Bacteria: x      ABG - ( 14 May 2025 05:15 )  pH: 7.41  /  pCO2: 33    /  pO2: 65    / HCO3: 21    / Base Excess: -3.1  /  SaO2: 94.3  /  LA: 1.4              RADIOLOGY & ADDITIONAL TESTS:  CXR: < from: Xray Chest 1 View- PORTABLE-Urgent (Xray Chest 1 View- PORTABLE-Urgent .) (05.15.25 @ 11:27) >  IMPRESSION:    Decreased bilateral opacities. Stable pleural effusion. No pneumothorax.    < end of copied text >    EKG: < from: 12 Lead ECG (05.15.25 @ 07:37) >    Ventricular Rate 78 BPM    Atrial Rate 78 BPM    P-R Interval 344 ms    QRS Duration 82 ms    Q-T Interval 384 ms    QTC Calculation(Bazett) 437 ms    P Axis 37 degrees    R Axis 6 degrees    T Axis 20 degrees    Diagnosis Line Sinus rhythm with 1st degree A-V block  Nonspecific ST abnormality  Abnormal ECG    < end of copied text >    MEDICATIONS  (STANDING):  albuterol/ipratropium for Nebulization 3 milliLiter(s) Nebulizer every 6 hours  ascorbic acid 500 milliGRAM(s) Oral two times a day  aspirin enteric coated 81 milliGRAM(s) Oral daily  atorvastatin 40 milliGRAM(s) Oral at bedtime  bisacodyl Suppository 10 milliGRAM(s) Rectal once  chlorhexidine 2% Cloths 1 Application(s) Topical daily  dextrose 5%. 1000 milliLiter(s) (50 mL/Hr) IV Continuous <Continuous>  dextrose 5%. 1000 milliLiter(s) (100 mL/Hr) IV Continuous <Continuous>  dextrose 50% Injectable 50 milliLiter(s) IV Push every 15 minutes  dextrose 50% Injectable 25 milliLiter(s) IV Push every 15 minutes  famotidine    Tablet 20 milliGRAM(s) Oral two times a day  furosemide   Injectable 20 milliGRAM(s) IV Push every 12 hours  gabapentin 100 milliGRAM(s) Oral every 8 hours  glucagon  Injectable 1 milliGRAM(s) IntraMuscular once  heparin   Injectable 5000 Unit(s) SubCutaneous every 8 hours  insulin lispro (ADMELOG) corrective regimen sliding scale   SubCutaneous three times a day before meals  insulin lispro (ADMELOG) corrective regimen sliding scale   SubCutaneous at bedtime  meperidine     Injectable 25 milliGRAM(s) IV Push once  polyethylene glycol 3350 17 Gram(s) Oral daily  senna 2 Tablet(s) Oral at bedtime  sodium chloride 0.9%. 1000 milliLiter(s) (20 mL/Hr) IV Continuous <Continuous>    MEDICATIONS  (PRN):  dextrose Oral Gel 15 Gram(s) Oral once PRN Blood Glucose LESS THAN 70 milliGRAM(s)/deciliter  fentaNYL    Injectable 25 MICROGram(s) IV Push every 4 hours PRN Severe Pain (7 - 10)  oxyCODONE    IR 5 milliGRAM(s) Oral every 4 hours PRN Moderate Pain (4 - 6)  oxyCODONE    IR 10 milliGRAM(s) Oral every 4 hours PRN Severe Pain (7 - 10)    HEPARIN:  [x] YES [] NO  Dose: 5000 UNITS Q8H  SCD's: YES b/l  GI Prophylaxis: Protonix [], Pepcid [x], None [], (Contra-indication:.....)    Post-Op Beta-Blockers: Yes [], No[x], If No, then contraindication: bradycardic   Post-Op Aspirin: Yes [x],  No [], If No, then contraindication:  Post-Op Statin: Yes [x], No[], If No, then contraindication:  Allergies    No Known Allergies    Intolerances      Ambulation/Activity Status: ambulate     Assessment/Plan:  74y Female status-post MVR POD#3  - Case and plan discussed with CTU Intensivist and CT Surgeon - Dr. Guillaume  - Continue CTU supportive care and ongoing plan of care as per continuing CTU rounds.   - Continue DVT/GI prophylaxis  - Incentive Spirometry 10 times an hour  - Continue to advance physical activity as tolerated and continue PT/OT as directed  1. MVR:  Continue ASA, statin, BB held d/t bradycardia. replete electrolytes PRN. pain control  2. HTN: Continue lopressor as tolerated by HR/BP  3. Post-op A. Fib ppx: monitor electrolytes, bb when pt can tolerate, vitamin c  4. POST-op Hypoxia: Coughing and deep breathing exercises/incentive spirometry encouraged/chest PT  5. DM/Glucose Control: FS ACHS with coverage for glycemic control  6. High grade AV block: EP consulted, Recs appreciated. Will monitor for now  7 b/l pleural effusions right > left- s/p thoracentesis draining 450 blood tinged fluid       Social Service Disposition:  Home with home care

## 2025-05-15 NOTE — PROGRESS NOTE ADULT - SUBJECTIVE AND OBJECTIVE BOX
CTU Attending Progress Daily Note     15 May 2025 18:29  Procedure: MVR     He has history of Gastric ulcer, h/o smoking, HTN    Benign essential HTN    Hyperlipidemia    Mitral regurgitation      HPI:75 y/o female with pmx of covid/smoking, HTN and elective MVR      Hospital Course:  5/12 Patient arrived to ICU , sedated on ventilator, large A-a gradient, coagulopathy, recevied 2 units rbc, 2 units platelets, cryo x2 and 26 mcg of DDAVP  5/13 extubated   5/14: POD # 2 - On NC 3 liters O2, EKG- NSR - accelerated junctional - no symptoms, CXR congested - lasix 20 mg BID, OOBTC, DC chest tube after walking  Had low HR/ high degree AV block alternating with first degree - paced at 60/mt - intermittently - EP consulted and monitor at this time - johnny recheck send- Possibly need pacer if no recovery   5/15: POD# 3, MVR - on O2 3 liters - wean off, OOBTC, 2 gram MSSO4/ KCL given, pain well controlled, Increased lasix to 20 mg TID with metalazone 5 mg, negative 1 liter so far    CXR b/l effusion, US showed right effusion - thoracentesis with 450 ml blood stained fluid removed, DC a line, walk around - diuretics to keep negative, labs at 2 pm with CXR follow up       OBJECTIVE:  ICU Vital Signs Last 24 Hrs  T(C): 36.8 (15 May 2025 08:00), Max: 36.8 (15 May 2025 08:00)  T(F): 98.2 (15 May 2025 08:00), Max: 98.2 (15 May 2025 08:00)  HR: 75 (15 May 2025 08:00) (61 - 84)  BP: --  BP(mean): --  ABP: 121/50 (15 May 2025 08:00) (102/46 - 152/66)  ABP(mean): 71 (15 May 2025 08:00) (65 - 90)  RR: 19 (15 May 2025 08:00) (14 - 26)  SpO2: 98% (15 May 2025 08:00) (95% - 99%)    O2 Parameters below as of 15 May 2025 07:00  Patient On (Oxygen Delivery Method): nasal cannula  O2 Flow (L/min): 3    I&O's Summary    14 May 2025 07:01  -  15 May 2025 07:00  --------------------------------------------------------  IN: 1180 mL / OUT: 1920 mL / NET: -740 mL    15 May 2025 07:01  -  15 May 2025 11:19  --------------------------------------------------------  IN: 120 mL / OUT: 300 mL / NET: -180 mL      I&O's Detail    14 May 2025 07:01  -  15 May 2025 07:00  --------------------------------------------------------  IN:    IV PiggyBack: 100 mL    Oral Fluid: 1080 mL  Total IN: 1180 mL    OUT:    Chest Tube (mL): 0 mL    Indwelling Catheter - Urethral (mL): 720 mL    Voided (mL): 1200 mL  Total OUT: 1920 mL    Total NET: -740 mL      15 May 2025 07:01  -  15 May 2025 11:19  --------------------------------------------------------  IN:    Oral Fluid: 120 mL  Total IN: 120 mL    OUT:    Voided (mL): 300 mL  Total OUT: 300 mL    Total NET: -180 mL    CAPILLARY BLOOD GLUCOSE      POCT Blood Glucose.: 118 mg/dL (15 May 2025 07:37)  POCT Blood Glucose.: 127 mg/dL (14 May 2025 21:22)  POCT Blood Glucose.: 119 mg/dL (14 May 2025 14:38)  POCT Blood Glucose.: 145 mg/dL (14 May 2025 11:29)    LABS:  ABG - ( 14 May 2025 05:15 )  pH, Arterial: 7.41  pH, Blood: x     /  pCO2: 33    /  pO2: 65    / HCO3: 21    / Base Excess: -3.1  /  SaO2: 94.3                                    9.6    10.03 )-----------( 153      ( 15 May 2025 02:25 )             28.4     05-15    138  |  105  |  22[H]  ----------------------------<  114[H]  3.9   |  23  |  0.8    Ca    8.3[L]      15 May 2025 02:25  Phos  2.4     05-14  Mg     1.8     05-15    TPro  5.7[L]  /  Alb  3.6  /  TBili  0.4  /  DBili  x   /  AST  22  /  ALT  9   /  AlkPhos  45  05-15    PT/INR - ( 14 May 2025 01:34 )   PT: 13.00 sec;   INR: 1.10 ratio         PTT - ( 14 May 2025 01:34 )  PTT:27.1 sec  Urinalysis Basic - ( 15 May 2025 02:25 )    Color: x / Appearance: x / SG: x / pH: x  Gluc: 114 mg/dL / Ketone: x  / Bili: x / Urobili: x   Blood: x / Protein: x / Nitrite: x   Leuk Esterase: x / RBC: x / WBC x   Sq Epi: x / Non Sq Epi: x / Bacteria: x        Home Medications:  aspirin 81 mg oral tablet: orally once a day (12 May 2025 06:55)  aspirin 81 mg po q day:  (12 May 2025 06:55)  Famotidine: 40 milligram(s) orally once a day (12 May 2025 06:55)  loratadine po q day:  (12 May 2025 06:55)  losartan hctz 50/12.5 mg po q day :  (12 May 2025 06:55)  Meloxicam: 15 milligram(s) orally once a day (12 May 2025 06:55)    HOSPITAL MEDICATIONS:  MEDICATIONS  (STANDING):  acetaminophen   IVPB .. 1000 milliGRAM(s) IV Intermittent once  albuterol/ipratropium for Nebulization 3 milliLiter(s) Nebulizer every 6 hours  ascorbic acid 500 milliGRAM(s) Oral two times a day  aspirin enteric coated 81 milliGRAM(s) Oral daily  atorvastatin 40 milliGRAM(s) Oral at bedtime  bisacodyl Suppository 10 milliGRAM(s) Rectal once  chlorhexidine 2% Cloths 1 Application(s) Topical daily  dextrose 5%. 1000 milliLiter(s) (50 mL/Hr) IV Continuous <Continuous>  dextrose 5%. 1000 milliLiter(s) (100 mL/Hr) IV Continuous <Continuous>  famotidine    Tablet 20 milliGRAM(s) Oral two times a day  furosemide   Injectable 20 milliGRAM(s) IV Push every 12 hours  furosemide   Injectable 20 milliGRAM(s) IV Push once  gabapentin 100 milliGRAM(s) Oral every 8 hours  glucagon  Injectable 1 milliGRAM(s) IntraMuscular once  heparin   Injectable 5000 Unit(s) SubCutaneous every 8 hours  magnesium sulfate  IVPB 2 Gram(s) IV Intermittent once  meperidine     Injectable 25 milliGRAM(s) IV Push once  metolazone 5 milliGRAM(s) Oral once  polyethylene glycol 3350 17 Gram(s) Oral daily  potassium chloride   Powder 40 milliEquivalent(s) Oral once  senna 2 Tablet(s) Oral at bedtime  sodium chloride 0.9%. 1000 milliLiter(s) (20 mL/Hr) IV Continuous <Continuous>    MEDICATIONS  (PRN):  fentaNYL    Injectable 25 MICROGram(s) IV Push every 4 hours PRN Severe Pain (7 - 10)  oxyCODONE    IR 5 milliGRAM(s) Oral every 4 hours PRN Moderate Pain (4 - 6)  oxyCODONE    IR 10 milliGRAM(s) Oral every 4 hours PRN Severe Pain (7 - 10)    RADIOLOGY:  Chest X-ray Reviewed    REVIEW OF SYSTEMS:  CONSTITUTIONAL: [X] all negative; [ ] weakness, [ ] fevers, [ ] chills  EYES/ENT: [X] all negative; [ ] visual changes, [ ] vertigo, [ ] throat pain   NECK: [X] all negative; [ ] pain, [ ] stiffness  RESPIRATORY: [] all negative, [ ] cough, [ ] wheezing, [ ] hemoptysis, [ ] shortness of breath  CARDIOVASCULAR: [] all negative; [ ] chest pain, [ ] palpitations, [ ] orthopnea  GASTROINTESTINAL: [X] all negative; [ ]abdominal pain, [ ] nausea, [ ] vomiting, [ ] hematemesis, [ ] diarrhea, [ ] constipation, [ ] melena, [ ] hematochezia.  GENITOURINARY: [X] all negative; [ ] dysuria, [ ] frequency, [ ] hematuria  NEUROLOGICAL: [X] all negative; [ ] numbness, [ ] weakness  SKIN: [X] all negative; [ ] itching, [ ] burning, [ ] rashes, [ ] lesions   All other review of systems is negative unless indicated above.  [  ] Unable to assess ROS because     PHYSICAL EXAM:          CONSTITUTIONAL: in no acute distress.   	SKIN: warm, dry  	HEAD: Normocephalic; atraumatic.  	EYES: PERRL, EOM, no conj injection, sclera clear  	ENT: No nasal discharge; airway clear.  	NECK: Supple; non tender.   	CARD: S1, S2 normal; no murmurs, gallops, or rubs. Regular rate and rhythm.  no carotid bruits  	RESP: CTA B/L; good air movement No wheezes, rales or rhonchi.  	ABD: Soft, not tender, not distended,  no rebound or guarding, bowel sounds present  	EXT: Normal ROM.  No clubbing, cyanosis or edema.   warm and well perfused.  pulses palpable  	NEURO: Alert, awake, motor 5/5 R, 5/5 L    Assessment  75 y/o female sp MVR    Plan:    Neuro:  Multimodal pain management  Tylenol, Lidoderm patch, gabapentin, opiates as needed  Normal neuro status in the post op period    CV:  S/P MVR  Maintain MAP > 65  ASA, statin, lasix TIDm metalazone   CXR congested - pleural effusion right drained 450 ml blood stained fluid   AV blocks resolved - had Intermittent high degree block with bradycardia and first degree AV block on 5/14 - paced at 60/mt, EP consulted and monitor and no intervention  recovering arrythmia - no BB and no amiodarone     Resp:  keep spo2 > 92   Supplemental oxygen to maintain sats > 92%  Continuous pulse oximetry monitoring  IS / Chest PT  Nebulizers as needed    GI:  Diet   PUD ppx per protocol    Renal  good UOP No JAGDEEP post surgery  Monitor UOP, lytes, creatinine  Diuretics for negative fluid balance  Keep K > 4, Mg > 2    Endo:  Tight glucose control per CTICU protocol  Insulin SSI as needed - no DM     Heme:  s/p Acute blood loss anemia post surgery  High risk for thrombocytopenia  DVT prophylaxis hep sq    ID:  Perioperative prophylactic abx per protocol  Monitor fever curve and WBC count  Remove TLC when no longer indicated  DC a line     Upon my evaluation, the above patient has a high probabillity of imminent or life threatening deterioration due to a high risk for Shock, Cardiogenic shock, arrythmias, acute blood loss, acute kidney injury and acute pulmonary insufficiency which required my direct attention, intervention, and personal management.  Total time was 55 minutes which includes review of radiology results, ordering, interpreting and reviewing diagnostic studies / lab tests with immediate assessment and treatment, consultant collaboration on findings and treatment options, monitoring for potential decompensation, obtaining history from family, EMT, nursing home staff and/or treating physicians; directing the titration of pressors, oxygen support devices, fluid resucitation, interpretation of cardiac output measurements, interpretation of radiological assessments, interpretation of EKG / rhythm strips, telemetry.  This time did not overlap with the management of other patients or performing separately reportable procedures.      Management of this patient was discussed with the CT surgery attending and mid-level providers.    I ackknowledge the use of copied documentation.  All copy forward documentation is my own and has been reviewed and revised as appropriate.  If no changes were made, it is because that information remains the same.

## 2025-05-15 NOTE — PROCEDURE NOTE - ULTRASOUND ASSESSMENT OF FLUID/LOCATION
yes
Vital Signs Last 24 Hrs  T(C): 36.8 (07 Nov 2019 05:08), Max: 37 (06 Nov 2019 21:00)  T(F): 98.3 (07 Nov 2019 05:08), Max: 98.6 (06 Nov 2019 21:00)  HR: 89 (07 Nov 2019 05:08) (70 - 103)  BP: 121/86 (07 Nov 2019 05:08) (106/60 - 163/114)  BP(mean): 88 (06 Nov 2019 23:30) (72 - 147)  RR: 17 (07 Nov 2019 05:08) (12 - 26)  SpO2: 99% (07 Nov 2019 05:08) (68% - 100%)
Vital Signs Last 24 Hrs  T(C): 36.9 (08 Nov 2019 09:55), Max: 37.7 (07 Nov 2019 21:13)  T(F): 98.4 (08 Nov 2019 09:55), Max: 99.9 (07 Nov 2019 21:13)  HR: 89 (08 Nov 2019 09:55) (83 - 104)  BP: 121/66 (08 Nov 2019 09:55) (106/71 - 131/50)  BP(mean): --  RR: 17 (08 Nov 2019 09:55) (17 - 18)  SpO2: 98% (08 Nov 2019 09:55) (96% - 100%)

## 2025-05-15 NOTE — PROCEDURE NOTE - NSPROCDETAILS_GEN_ALL_CORE
aspirated into the bag/location identified, draped/prepped, sterile technique used, needle inserted/introduced/catheter inserted over needle/connection to syringe/ultrasound assessment of effusion (localization)

## 2025-05-16 LAB
ALBUMIN SERPL ELPH-MCNC: 3.6 G/DL — SIGNIFICANT CHANGE UP (ref 3.5–5.2)
ALP SERPL-CCNC: 48 U/L — SIGNIFICANT CHANGE UP (ref 30–115)
ALT FLD-CCNC: 8 U/L — SIGNIFICANT CHANGE UP (ref 0–41)
ANION GAP SERPL CALC-SCNC: 11 MMOL/L — SIGNIFICANT CHANGE UP (ref 7–14)
AST SERPL-CCNC: 17 U/L — SIGNIFICANT CHANGE UP (ref 0–41)
BASOPHILS # BLD AUTO: 0.02 K/UL — SIGNIFICANT CHANGE UP (ref 0–0.2)
BASOPHILS NFR BLD AUTO: 0.3 % — SIGNIFICANT CHANGE UP (ref 0–1)
BILIRUB SERPL-MCNC: 0.5 MG/DL — SIGNIFICANT CHANGE UP (ref 0.2–1.2)
BUN SERPL-MCNC: 24 MG/DL — HIGH (ref 10–20)
CALCIUM SERPL-MCNC: 9 MG/DL — SIGNIFICANT CHANGE UP (ref 8.4–10.5)
CHLORIDE SERPL-SCNC: 97 MMOL/L — LOW (ref 98–110)
CO2 SERPL-SCNC: 26 MMOL/L — SIGNIFICANT CHANGE UP (ref 17–32)
CREAT SERPL-MCNC: 0.9 MG/DL — SIGNIFICANT CHANGE UP (ref 0.7–1.5)
EGFR: 68 ML/MIN/1.73M2 — SIGNIFICANT CHANGE UP
EGFR: 68 ML/MIN/1.73M2 — SIGNIFICANT CHANGE UP
EOSINOPHIL # BLD AUTO: 0.07 K/UL — SIGNIFICANT CHANGE UP (ref 0–0.7)
EOSINOPHIL NFR BLD AUTO: 0.9 % — SIGNIFICANT CHANGE UP (ref 0–8)
GLUCOSE SERPL-MCNC: 100 MG/DL — HIGH (ref 70–99)
HCT VFR BLD CALC: 28 % — LOW (ref 37–47)
HGB BLD-MCNC: 9.4 G/DL — LOW (ref 12–16)
IMM GRANULOCYTES NFR BLD AUTO: 0.4 % — HIGH (ref 0.1–0.3)
LYMPHOCYTES # BLD AUTO: 1.9 K/UL — SIGNIFICANT CHANGE UP (ref 1.2–3.4)
LYMPHOCYTES # BLD AUTO: 23.8 % — SIGNIFICANT CHANGE UP (ref 20.5–51.1)
MAGNESIUM SERPL-MCNC: 2.1 MG/DL — SIGNIFICANT CHANGE UP (ref 1.8–2.4)
MCHC RBC-ENTMCNC: 28.3 PG — SIGNIFICANT CHANGE UP (ref 27–31)
MCHC RBC-ENTMCNC: 33.6 G/DL — SIGNIFICANT CHANGE UP (ref 32–37)
MCV RBC AUTO: 84.3 FL — SIGNIFICANT CHANGE UP (ref 81–99)
MONOCYTES # BLD AUTO: 0.84 K/UL — HIGH (ref 0.1–0.6)
MONOCYTES NFR BLD AUTO: 10.5 % — HIGH (ref 1.7–9.3)
NEUTROPHILS # BLD AUTO: 5.12 K/UL — SIGNIFICANT CHANGE UP (ref 1.4–6.5)
NEUTROPHILS NFR BLD AUTO: 64.1 % — SIGNIFICANT CHANGE UP (ref 42.2–75.2)
NRBC BLD AUTO-RTO: 0 /100 WBCS — SIGNIFICANT CHANGE UP (ref 0–0)
PLATELET # BLD AUTO: 174 K/UL — SIGNIFICANT CHANGE UP (ref 130–400)
PMV BLD: 10.8 FL — HIGH (ref 7.4–10.4)
POTASSIUM SERPL-MCNC: 3.8 MMOL/L — SIGNIFICANT CHANGE UP (ref 3.5–5)
POTASSIUM SERPL-SCNC: 3.8 MMOL/L — SIGNIFICANT CHANGE UP (ref 3.5–5)
PROT SERPL-MCNC: 5.5 G/DL — LOW (ref 6–8)
RBC # BLD: 3.32 M/UL — LOW (ref 4.2–5.4)
RBC # FLD: 15.3 % — HIGH (ref 11.5–14.5)
SODIUM SERPL-SCNC: 134 MMOL/L — LOW (ref 135–146)
SURGICAL PATHOLOGY STUDY: SIGNIFICANT CHANGE UP
WBC # BLD: 7.98 K/UL — SIGNIFICANT CHANGE UP (ref 4.8–10.8)
WBC # FLD AUTO: 7.98 K/UL — SIGNIFICANT CHANGE UP (ref 4.8–10.8)

## 2025-05-16 PROCEDURE — 99233 SBSQ HOSP IP/OBS HIGH 50: CPT

## 2025-05-16 PROCEDURE — 71045 X-RAY EXAM CHEST 1 VIEW: CPT | Mod: 26

## 2025-05-16 PROCEDURE — 93010 ELECTROCARDIOGRAM REPORT: CPT | Mod: 76

## 2025-05-16 RX ORDER — BISACODYL 5 MG
5 TABLET, DELAYED RELEASE (ENTERIC COATED) ORAL EVERY 12 HOURS
Refills: 0 | Status: DISCONTINUED | OUTPATIENT
Start: 2025-05-16 | End: 2025-05-20

## 2025-05-16 RX ORDER — ACETAMINOPHEN 500 MG/5ML
1000 LIQUID (ML) ORAL ONCE
Refills: 0 | Status: COMPLETED | OUTPATIENT
Start: 2025-05-16 | End: 2025-05-16

## 2025-05-16 RX ADMIN — Medication 40 MILLIEQUIVALENT(S): at 06:18

## 2025-05-16 RX ADMIN — Medication 81 MILLIGRAM(S): at 11:00

## 2025-05-16 RX ADMIN — Medication 400 MILLIGRAM(S): at 12:04

## 2025-05-16 RX ADMIN — OXYCODONE HYDROCHLORIDE 5 MILLIGRAM(S): 30 TABLET ORAL at 21:14

## 2025-05-16 RX ADMIN — POLYETHYLENE GLYCOL 3350 17 GRAM(S): 17 POWDER, FOR SOLUTION ORAL at 11:00

## 2025-05-16 RX ADMIN — HEPARIN SODIUM 5000 UNIT(S): 1000 INJECTION INTRAVENOUS; SUBCUTANEOUS at 21:15

## 2025-05-16 RX ADMIN — GABAPENTIN 100 MILLIGRAM(S): 400 CAPSULE ORAL at 05:06

## 2025-05-16 RX ADMIN — Medication 1 APPLICATION(S): at 11:00

## 2025-05-16 RX ADMIN — OXYCODONE HYDROCHLORIDE 5 MILLIGRAM(S): 30 TABLET ORAL at 22:30

## 2025-05-16 RX ADMIN — IPRATROPIUM BROMIDE AND ALBUTEROL SULFATE 3 MILLILITER(S): .5; 2.5 SOLUTION RESPIRATORY (INHALATION) at 02:15

## 2025-05-16 RX ADMIN — Medication 40 MILLIEQUIVALENT(S): at 14:18

## 2025-05-16 RX ADMIN — IPRATROPIUM BROMIDE AND ALBUTEROL SULFATE 3 MILLILITER(S): .5; 2.5 SOLUTION RESPIRATORY (INHALATION) at 13:46

## 2025-05-16 RX ADMIN — Medication 500 MILLIGRAM(S): at 17:18

## 2025-05-16 RX ADMIN — GABAPENTIN 100 MILLIGRAM(S): 400 CAPSULE ORAL at 21:13

## 2025-05-16 RX ADMIN — FUROSEMIDE 20 MILLIGRAM(S): 10 INJECTION INTRAMUSCULAR; INTRAVENOUS at 17:18

## 2025-05-16 RX ADMIN — Medication 40 MILLIEQUIVALENT(S): at 21:13

## 2025-05-16 RX ADMIN — Medication 20 MILLIGRAM(S): at 05:06

## 2025-05-16 RX ADMIN — IPRATROPIUM BROMIDE AND ALBUTEROL SULFATE 3 MILLILITER(S): .5; 2.5 SOLUTION RESPIRATORY (INHALATION) at 20:13

## 2025-05-16 RX ADMIN — Medication 5 MILLIGRAM(S): at 11:00

## 2025-05-16 RX ADMIN — Medication 5 MILLIGRAM(S): at 17:18

## 2025-05-16 RX ADMIN — GABAPENTIN 100 MILLIGRAM(S): 400 CAPSULE ORAL at 17:19

## 2025-05-16 RX ADMIN — ATORVASTATIN CALCIUM 40 MILLIGRAM(S): 80 TABLET, FILM COATED ORAL at 21:14

## 2025-05-16 RX ADMIN — Medication 500 MILLIGRAM(S): at 05:07

## 2025-05-16 RX ADMIN — IPRATROPIUM BROMIDE AND ALBUTEROL SULFATE 3 MILLILITER(S): .5; 2.5 SOLUTION RESPIRATORY (INHALATION) at 08:08

## 2025-05-16 RX ADMIN — Medication 1000 MILLIGRAM(S): at 12:34

## 2025-05-16 RX ADMIN — FUROSEMIDE 20 MILLIGRAM(S): 10 INJECTION INTRAMUSCULAR; INTRAVENOUS at 05:07

## 2025-05-16 RX ADMIN — Medication 2 TABLET(S): at 21:13

## 2025-05-16 RX ADMIN — Medication 20 MILLIGRAM(S): at 17:19

## 2025-05-16 RX ADMIN — HEPARIN SODIUM 5000 UNIT(S): 1000 INJECTION INTRAVENOUS; SUBCUTANEOUS at 05:06

## 2025-05-16 RX ADMIN — HEPARIN SODIUM 5000 UNIT(S): 1000 INJECTION INTRAVENOUS; SUBCUTANEOUS at 17:19

## 2025-05-16 NOTE — PROGRESS NOTE ADULT - ASSESSMENT
#2:1 AVB with Mobitz 2nd degree Type I  #MR s/p MV repair, POD #4  #HTN  #COPD  #AFlutter  #Pleural effusion    Rec  - Monitor on tele  - Monitor lytes. Keep K>4 and Mg>2  - BB once BP allows  - Cont to monitor for recurrence of AFlutter. CHADS VASC of at least 3 (HTN, Age > 65, F). Would benefit from full OAC  - No indication for PPM at this time as pt is hemodynamically stable asymptomatic. No evidence of high degree AV block  - Rec MCOT prior to discharge

## 2025-05-16 NOTE — PROGRESS NOTE ADULT - SUBJECTIVE AND OBJECTIVE BOX
CTU Attending Progress Daily Note     16 May 2025 13:51  Procedure: MVR  POD#: 4    history of Gastric ulcer  Benign essential HTN  Hyperlipidemia  Mitral regurgitation  COPD with hypoxia  Acute respiratory failure with hypoxia        HPI:75 y/o female with pmx of covid/smoking, HTN and elective MVR      Hospital Course:  5/12 Patient arrived to ICU , sedated on ventilator, large A-a gradient, coagulopathy, recevied 2 units rbc, 2 units platelets, cryo x2 and 26 mcg of DDAVP  5/13 extubated   5/14: POD # 2 - On NC 3 liters O2, EKG- NSR - accelerated junctional - no symptoms, CXR congested - lasix 20 mg BID, OOBTC, DC chest tube after walking  Had low HR/ high degree AV block alternating with first degree - paced at 60/mt - intermittently - EP consulted and monitor at this time - johnny recheck send- Possibly need pacer if no recovery   5/15: POD# 3, MVR - on O2 3 liters - wean off, OOBTC, 2 gram MSSO4/ KCL given, pain well controlled, Increased lasix to 20 mg TID with metalazone 5 mg, negative 1 liter so far    CXR b/l effusion, US showed right effusion - thoracentesis with 450 ml blood stained fluid removed, DC a line, walk around - diuretics to keep negative, labs at 2 pm with CXR follow up   5/16 rate controlled Flutter        OBJECTIVE:  ICU Vital Signs Last 24 Hrs  T(C): 36.4 (16 May 2025 08:00), Max: 37.8 (15 May 2025 20:00)  T(F): 97.6 (16 May 2025 08:00), Max: 100.1 (15 May 2025 20:00)  HR: 87 (16 May 2025 12:00) (73 - 101)  BP: 119/70 (16 May 2025 12:00) (94/63 - 127/69)  BP(mean): 87 (16 May 2025 12:00) (68 - 93)  ABP: --  ABP(mean): --  RR: 26 (16 May 2025 12:00) (16 - 39)  SpO2: 95% (16 May 2025 12:00) (92% - 100%)    O2 Parameters below as of 16 May 2025 08:00  Patient On (Oxygen Delivery Method): room air          I&O's Summary    15 May 2025 07:01  -  16 May 2025 07:00  --------------------------------------------------------  IN: 1080 mL / OUT: 3300 mL / NET: -2220 mL    16 May 2025 07:01  -  16 May 2025 13:51  --------------------------------------------------------  IN: 480 mL / OUT: 300 mL / NET: 180 mL      I&O's Detail    15 May 2025 07:01  -  16 May 2025 07:00  --------------------------------------------------------  IN:    Oral Fluid: 1080 mL  Total IN: 1080 mL    OUT:    Other (mL): 450 mL    Voided (mL): 2850 mL  Total OUT: 3300 mL    Total NET: -2220 mL      16 May 2025 07:01  -  16 May 2025 13:51  --------------------------------------------------------  IN:    Oral Fluid: 480 mL  Total IN: 480 mL    OUT:    Voided (mL): 300 mL  Total OUT: 300 mL    Total NET: 180 mL      LABS:                          9.4    7.98  )-----------( 174      ( 16 May 2025 02:17 )             28.0     05-16    134[L]  |  97[L]  |  24[H]  ----------------------------<  100[H]  3.8   |  26  |  0.9    Ca    9.0      16 May 2025 02:17  Phos  2.4     05-14  Mg     2.1     05-16    TPro  5.5[L]  /  Alb  3.6  /  TBili  0.5  /  DBili  x   /  AST  17  /  ALT  8   /  AlkPhos  48  05-16      Urinalysis Basic - ( 16 May 2025 02:17 )    Color: x / Appearance: x / SG: x / pH: x  Gluc: 100 mg/dL / Ketone: x  / Bili: x / Urobili: x   Blood: x / Protein: x / Nitrite: x   Leuk Esterase: x / RBC: x / WBC x   Sq Epi: x / Non Sq Epi: x / Bacteria: x        Home Medications:  aspirin 81 mg oral tablet: orally once a day (12 May 2025 06:55)  aspirin 81 mg po q day:  (12 May 2025 06:55)  Famotidine: 40 milligram(s) orally once a day (12 May 2025 06:55)  loratadine po q day:  (12 May 2025 06:55)  losartan hctz 50/12.5 mg po q day :  (12 May 2025 06:55)  Meloxicam: 15 milligram(s) orally once a day (12 May 2025 06:55)    HOSPITAL MEDICATIONS:  MEDICATIONS  (STANDING):  acetaminophen   IVPB .. 1000 milliGRAM(s) IV Intermittent once  albuterol/ipratropium for Nebulization 3 milliLiter(s) Nebulizer every 6 hours  ascorbic acid 500 milliGRAM(s) Oral two times a day  aspirin enteric coated 81 milliGRAM(s) Oral daily  atorvastatin 40 milliGRAM(s) Oral at bedtime  bisacodyl 5 milliGRAM(s) Oral every 12 hours  bisacodyl Suppository 10 milliGRAM(s) Rectal once  chlorhexidine 2% Cloths 1 Application(s) Topical daily  dextrose 5%. 1000 milliLiter(s) (50 mL/Hr) IV Continuous <Continuous>  dextrose 5%. 1000 milliLiter(s) (100 mL/Hr) IV Continuous <Continuous>  famotidine    Tablet 20 milliGRAM(s) Oral two times a day  furosemide   Injectable 20 milliGRAM(s) IV Push every 12 hours  furosemide   Injectable 20 milliGRAM(s) IV Push once  gabapentin 100 milliGRAM(s) Oral every 8 hours  glucagon  Injectable 1 milliGRAM(s) IntraMuscular once  heparin   Injectable 5000 Unit(s) SubCutaneous every 8 hours  meperidine     Injectable 25 milliGRAM(s) IV Push once  polyethylene glycol 3350 17 Gram(s) Oral daily  potassium chloride   Powder 40 milliEquivalent(s) Oral every 8 hours  senna 2 Tablet(s) Oral at bedtime    MEDICATIONS  (PRN):  fentaNYL    Injectable 25 MICROGram(s) IV Push every 4 hours PRN Severe Pain (7 - 10)  oxyCODONE    IR 5 milliGRAM(s) Oral every 4 hours PRN Moderate Pain (4 - 6)  oxyCODONE    IR 10 milliGRAM(s) Oral every 4 hours PRN Severe Pain (7 - 10)    RADIOLOGY:  Chest X-ray Reviewed    REVIEW OF SYSTEMS:  CONSTITUTIONAL: [X] all negative; [ ] weakness, [ ] fevers, [ ] chills  EYES/ENT: [X] all negative; [ ] visual changes, [ ] vertigo, [ ] throat pain   NECK: [X] all negative; [ ] pain, [ ] stiffness  RESPIRATORY: [x] all negative, [ ] cough, [ ] wheezing, [ ] hemoptysis, [ ] shortness of breath  CARDIOVASCULAR: [x] all negative; [ ] chest pain, [ ] palpitations, [ ] orthopnea  GASTROINTESTINAL: [X] all negative; [ ]abdominal pain, [ ] nausea, [ ] vomiting, [ ] hematemesis, [ ] diarrhea, [ ] constipation, [ ] melena, [ ] hematochezia.  GENITOURINARY: [X] all negative; [ ] dysuria, [ ] frequency, [ ] hematuria  NEUROLOGICAL: [X] all negative; [ ] numbness, [ ] weakness  SKIN: [X] all negative; [ ] itching, [ ] burning, [ ] rashes, [ ] lesions   All other review of systems is negative unless indicated above.  [  ] Unable to assess ROS because     PHYSICAL EXAM:          CONSTITUTIONAL: Well-developed; well-nourished; in no acute distress.   	SKIN: warm, dry  	HEAD: Normocephalic; atraumatic.  	EYES: PERRL, EOM, no conj injection, sclera clear  	ENT: No nasal discharge; airway clear.  	NECK: Supple; non tender.   	CARD: S1, S2 normal; no murmurs, gallops, or rubs. Regular rate and rhythm.  no carotid bruits  	RESP: CTA B/L; good air movement No wheezes, rales or rhonchi.  	ABD: Soft, not tender, not distended,  no rebound or guarding, bowel sounds present  	EXT: Normal ROM.  No clubbing, cyanosis or edema.   warm and well perfused.  pulses palpable  	NEURO: Alert, awake, motor 5/5 R, 5/5 L    Assessment   s/p MVR    Plan:    Neuro:  Multimodal pain management  Tylenol, Lidoderm patch, gabapentin, opiates as needed  Monitor neuro status in the post op period    CV:  Monitor perfusion, , lactate, UOP, index and MVO2 if available  Maintain MAP > 65  ASA, statin      Resp:    Supplemental oxygen to maintain sats > 92%  Continuous pulse oximetry monitoring  IS / Chest PT  OOBTC and Ambulate  Nebulizers as needed    GI:  Heart healthy diet  Bowel Regimen - escalate as needed  PUD ppx per protocol    Renal  High risk for JAGDEEP post surgery  Monitor UOP, lytes, creatinine  Diuretics as needed for negative fluid balance  Keep K > 4, Mg > 2    Endo:  Tight glucose control per CTICU protocol  Insulin gtt as needed  Transition to Lantus / SSI and premeal insulin as needed    Heme:  Acute blood loss anemia post surgery  DVT prophylaxis once bleeding risk post surgery is minimized    ID:  Perioperative prophylactic abx per protocol  Monitor fever curve and WBC count  Remove TLC when no longer indicated        Upon my evaluation, the above patient has a high probability of imminent or life threatening deterioration due to a high risk for Shock, Cardiogenic shock, arrythmias, acute blood loss, acute kidney injury and acute pulmonary insufficiency which required my direct attention, intervention, and personal management.  Total time was 55 minutes which includes review of radiology results, ordering, interpreting and reviewing diagnostic studies / lab tests with immediate assessment and treatment, consultant collaboration on findings and treatment options, monitoring for potential decompensation, obtaining history from family, EMT, nursing home staff and/or treating physicians; directing the titration of pressors, oxygen support devices, fluid resuscitation, interpretation of cardiac output measurements, interpretation of radiological assessments, interpretation of EKG / rhythm strips, telemetry.  This time did not overlap with the management of other patients or performing separately reportable procedures.      Management of this patient was discussed with the CT surgery attending and mid-level providers.    I acknowledge the use of copied documentation.  All copy forward documentation is my own and has been reviewed and revised as appropriate.  If no changes were made, it is because that information remains the same.

## 2025-05-16 NOTE — CHART NOTE - NSCHARTNOTEFT_GEN_A_CORE
Tele evaluation:     The patient is currently conducting 1:1. The patient had Wenckebach, blocked atrial premature contractions.    Plan:   - Continue tele monitoring  - Contact electrophysiology if the patient has further episodes of atrioventricular block Tele evaluation:     The patient is currently conducting 1:1. The patient had Wenckebach, blocked atrial premature contractions, 20 minutes of atrial flutter.    Plan:   - Continue tele monitoring  - Contact electrophysiology if the patient has further episodes of atrioventricular block  - MCOT (mobile cardiac outpatient telemetry) on discharge  - If atrial flutter recurs would initiate anticoagulation. [id number] = 3

## 2025-05-16 NOTE — PROGRESS NOTE ADULT - SUBJECTIVE AND OBJECTIVE BOX
OPERATIVE PROCEDURE(s):     mvr           POD # 4    SURGEON(s): elenita      SUBJECTIVE ASSESSMENT: pt is without complaints and went into a. flutter this morninig    Vital Signs Last 24 Hrs  T(C): 36.4 (16 May 2025 08:00), Max: 37.8 (15 May 2025 20:00)  T(F): 97.6 (16 May 2025 08:00), Max: 100.1 (15 May 2025 20:00)  HR: 87 (16 May 2025 12:00) (73 - 101)  BP: 119/70 (16 May 2025 12:00) (94/63 - 127/69)  BP(mean): 87 (16 May 2025 12:00) (68 - 93)  RR: 26 (16 May 2025 12:00) (16 - 39)  SpO2: 95% (16 May 2025 12:00) (92% - 100%)    Parameters below as of 16 May 2025 08:00  Patient On (Oxygen Delivery Method): room air      05-15-25 @ 07:01  -  05-16-25 @ 07:00  --------------------------------------------------------  IN: 1080 mL / OUT: 3300 mL / NET: -2220 mL    05-16-25 @ 07:01  -  05-16-25 @ 13:43  --------------------------------------------------------  IN: 480 mL / OUT: 300 mL / NET: 180 mL        Physical Exam:  General: NAD; A&Ox3  Cardiac: S1/S2, RRR, no murmur, no rubs  Lungs: unlabored respirations, CTA b/l, no wheeze, no rales, no crackles  Abdomen: Soft/NT/ND; positive bowel sounds x 4  Sternum: Intact, no click, incision healing well with no drainage  Incisions: Incisions clean/dry/intact  Extremities: No edema b/l lower extremities; good capillary refill; no cyanosis; palpable 1+ pedal pulses b/l      LABS:                        9.4    7.98  )-----------( 174      ( 16 May 2025 02:17 )             28.0     COUMADIN:   [ ] YES [ x] NO      05-16    134[L]  |  97[L]  |  24[H]  ----------------------------<  100[H]  3.8   |  26  |  0.9    Ca    9.0      16 May 2025 02:17  Phos  2.4     05-14  Mg     2.1     05-16    TPro  5.5[L]  /  Alb  3.6  /  TBili  0.5  /  DBili  x   /  AST  17  /  ALT  8   /  AlkPhos  48  05-16    Urinalysis Basic - ( 16 May 2025 02:17 )    Color: x / Appearance: x / SG: x / pH: x  Gluc: 100 mg/dL / Ketone: x  / Bili: x / Urobili: x   Blood: x / Protein: x / Nitrite: x   Leuk Esterase: x / RBC: x / WBC x   Sq Epi: x / Non Sq Epi: x / Bacteria: x        MEDICATIONS  (STANDING):  acetaminophen   IVPB .. 1000 milliGRAM(s) IV Intermittent once  albuterol/ipratropium for Nebulization 3 milliLiter(s) Nebulizer every 6 hours  ascorbic acid 500 milliGRAM(s) Oral two times a day  aspirin enteric coated 81 milliGRAM(s) Oral daily  atorvastatin 40 milliGRAM(s) Oral at bedtime  bisacodyl 5 milliGRAM(s) Oral every 12 hours  bisacodyl Suppository 10 milliGRAM(s) Rectal once  chlorhexidine 2% Cloths 1 Application(s) Topical daily  dextrose 5%. 1000 milliLiter(s) (50 mL/Hr) IV Continuous <Continuous>  dextrose 5%. 1000 milliLiter(s) (100 mL/Hr) IV Continuous <Continuous>  famotidine    Tablet 20 milliGRAM(s) Oral two times a day  furosemide   Injectable 20 milliGRAM(s) IV Push every 12 hours  furosemide   Injectable 20 milliGRAM(s) IV Push once  gabapentin 100 milliGRAM(s) Oral every 8 hours  glucagon  Injectable 1 milliGRAM(s) IntraMuscular once  heparin   Injectable 5000 Unit(s) SubCutaneous every 8 hours  meperidine     Injectable 25 milliGRAM(s) IV Push once  polyethylene glycol 3350 17 Gram(s) Oral daily  potassium chloride   Powder 40 milliEquivalent(s) Oral every 8 hours  senna 2 Tablet(s) Oral at bedtime    MEDICATIONS  (PRN):  fentaNYL    Injectable 25 MICROGram(s) IV Push every 4 hours PRN Severe Pain (7 - 10)  oxyCODONE    IR 5 milliGRAM(s) Oral every 4 hours PRN Moderate Pain (4 - 6)  oxyCODONE    IR 10 milliGRAM(s) Oral every 4 hours PRN Severe Pain (7 - 10)      Allergies    No Known Allergies    Intolerances        Ambulation/Activity Status:  amb with pt      RADIOLOGY & ADDITIONAL TESTS:  Xray Chest 1 View- PORTABLE-Routine: AM   Indication: Shortness of Breath  Transport: Portable,  w/ Monitor  Provider's Contact #: [phone] (05-16-25 @ 09:01)    ACC: [id number] EXAM:  XR CHEST PORTABLE ROUTINE 1V   ORDERED BY: LEYLA OMER     PROCEDURE DATE:  05/16/2025      INTERPRETATION:  CLINICAL HISTORY: Pleural effusion.    COMPARISON: Chest radiograph dated May 15, 2025.    TECHNIQUE: Portable frontal chest radiograph.    FINDINGS:    Support devices: Right IJ central venous catheter in place. Overlying EKG   leads.    Cardiac/mediastinum/hilum: Stable.    Lung parenchyma/Pleura: Unchanged bilateral opacities. Unchanged small   left basilaropacity/pleural effusion. No pneumothorax.    Skeleton/soft tissues: Stable.      IMPRESSION:    1. Unchanged bilateral opacities with unchanged small left basilar   opacity/pleural effusion.    --- End of Report ---    Assessment/Plan:  74y Female status-post MVR POD#4  - Case and plan discussed with CTU Intensivist and CT Surgeon - Dr. Guillaume  - Continue CTU supportive care and ongoing plan of care as per continuing CTU rounds.   - Continue DVT/GI prophylaxis  - Incentive Spirometry 10 times an hour  - Continue to advance physical activity as tolerated and continue PT/OT as directed  1. MVR:  Continue ASA, statin, BB held d/t bradycardia. replete electrolytes PRN. pain control  2. HTN: Continue lopressor as tolerated by HR/BP  3. Post-op A. Fib ppx: monitor electrolytes, bb when pt can tolerate, vitamin c- pt went into a. flutter and was unable to give any AV quyen blocking agents due to heart block- EP is following pateint  4. POST-op Hypoxia: Coughing and deep breathing exercises/incentive spirometry encouraged/chest PT  5. DM/Glucose Control: FS ACHS with coverage for glycemic control  6. High grade AV block: EP consulted, Recs appreciated. Will monitor for now  7 b/l pleural effusions right > left- s/p thoracentesis draining 450 blood tinged fluid       Social Service Disposition:  Home with home care

## 2025-05-16 NOTE — PROGRESS NOTE ADULT - SUBJECTIVE AND OBJECTIVE BOX
INTERVAL HPI/OVERNIGHT EVENTS: Patient is POD 4 from      MEDICATIONS  (STANDING):  albuterol/ipratropium for Nebulization 3 milliLiter(s) Nebulizer every 6 hours  ascorbic acid 500 milliGRAM(s) Oral two times a day  aspirin enteric coated 81 milliGRAM(s) Oral daily  atorvastatin 40 milliGRAM(s) Oral at bedtime  bisacodyl 5 milliGRAM(s) Oral every 12 hours  bisacodyl Suppository 10 milliGRAM(s) Rectal once  chlorhexidine 2% Cloths 1 Application(s) Topical daily  dextrose 5%. 1000 milliLiter(s) (50 mL/Hr) IV Continuous <Continuous>  dextrose 5%. 1000 milliLiter(s) (100 mL/Hr) IV Continuous <Continuous>  famotidine    Tablet 20 milliGRAM(s) Oral two times a day  furosemide   Injectable 20 milliGRAM(s) IV Push every 12 hours  furosemide   Injectable 20 milliGRAM(s) IV Push once  gabapentin 100 milliGRAM(s) Oral every 8 hours  glucagon  Injectable 1 milliGRAM(s) IntraMuscular once  heparin   Injectable 5000 Unit(s) SubCutaneous every 8 hours  meperidine     Injectable 25 milliGRAM(s) IV Push once  polyethylene glycol 3350 17 Gram(s) Oral daily  potassium chloride   Powder 40 milliEquivalent(s) Oral every 8 hours  senna 2 Tablet(s) Oral at bedtime    MEDICATIONS  (PRN):  fentaNYL    Injectable 25 MICROGram(s) IV Push every 4 hours PRN Severe Pain (7 - 10)  oxyCODONE    IR 5 milliGRAM(s) Oral every 4 hours PRN Moderate Pain (4 - 6)  oxyCODONE    IR 10 milliGRAM(s) Oral every 4 hours PRN Severe Pain (7 - 10)      Allergies    No Known Allergies    Intolerances        Vital Signs Last 24 Hrs  T(C): 36.4 (16 May 2025 08:00), Max: 37.8 (15 May 2025 20:00)  T(F): 97.6 (16 May 2025 08:00), Max: 100.1 (15 May 2025 20:00)  HR: 81 (16 May 2025 16:00) (68 - 101)  BP: 102/65 (16 May 2025 16:00) (94/63 - 127/69)  BP(mean): 76 (16 May 2025 16:00) (68 - 93)  RR: 23 (16 May 2025 16:00) (16 - 39)  SpO2: 98% (16 May 2025 16:00) (93% - 100%)    Parameters below as of 16 May 2025 08:00  Patient On (Oxygen Delivery Method): room air        05-15-25 @ 07:01  -  05-16-25 @ 07:00  --------------------------------------------------------  IN: 1080 mL / OUT: 3300 mL / NET: -2220 mL    05-16-25 @ 07:01  -  05-16-25 @ 18:35  --------------------------------------------------------  IN: 700 mL / OUT: 300 mL / NET: 400 mL          TELE: ~ 20 min of AFlutter from 12:50 pm to 1:08 pm.     Physical Exam    GENERAL: In no apparent distress, well nourished, and hydrated.  HEAD:  Atraumatic, Normocephalic  EYES: EOMI  ENMT: Moist mucous membranes  NECK: Supple, Right IJ line in place  HEART: Regular rate and rhythm; No murmurs, rubs, or gallops.  PULMONARY: Clear to auscultation and perfusion.  No rales, wheezing, or rhonchi bilaterally.  ABDOMEN: Normoactive bowel sounds.   EXTREMITIES:  2+ Peripheral Pulses. No clubbing, cyanosis, or edema  NEUROLOGICAL: Grossly nonfocal    LABS:                        9.4    7.98  )-----------( 174      ( 16 May 2025 02:17 )             28.0     05-16    134[L]  |  97[L]  |  24[H]  ----------------------------<  100[H]  3.8   |  26  |  0.9    Ca    9.0      16 May 2025 02:17  Mg     2.1     05-16    TPro  5.5[L]  /  Alb  3.6  /  TBili  0.5  /  DBili  x   /  AST  17  /  ALT  8   /  AlkPhos  48  05-16      Urinalysis Basic - ( 16 May 2025 02:17 )    Color: x / Appearance: x / SG: x / pH: x  Gluc: 100 mg/dL / Ketone: x  / Bili: x / Urobili: x   Blood: x / Protein: x / Nitrite: x   Leuk Esterase: x / RBC: x / WBC x   Sq Epi: x / Non Sq Epi: x / Bacteria: x        RADIOLOGY & ADDITIONAL TESTS:

## 2025-05-17 LAB
ALBUMIN SERPL ELPH-MCNC: 3.3 G/DL — LOW (ref 3.5–5.2)
ALP SERPL-CCNC: 46 U/L — SIGNIFICANT CHANGE UP (ref 30–115)
ALT FLD-CCNC: 9 U/L — SIGNIFICANT CHANGE UP (ref 0–41)
ANION GAP SERPL CALC-SCNC: 11 MMOL/L — SIGNIFICANT CHANGE UP (ref 7–14)
AST SERPL-CCNC: 16 U/L — SIGNIFICANT CHANGE UP (ref 0–41)
BASOPHILS # BLD AUTO: 0.03 K/UL — SIGNIFICANT CHANGE UP (ref 0–0.2)
BASOPHILS NFR BLD AUTO: 0.4 % — SIGNIFICANT CHANGE UP (ref 0–1)
BILIRUB SERPL-MCNC: 0.4 MG/DL — SIGNIFICANT CHANGE UP (ref 0.2–1.2)
BUN SERPL-MCNC: 23 MG/DL — HIGH (ref 10–20)
CALCIUM SERPL-MCNC: 8.4 MG/DL — SIGNIFICANT CHANGE UP (ref 8.4–10.5)
CHLORIDE SERPL-SCNC: 99 MMOL/L — SIGNIFICANT CHANGE UP (ref 98–110)
CO2 SERPL-SCNC: 25 MMOL/L — SIGNIFICANT CHANGE UP (ref 17–32)
CREAT SERPL-MCNC: 0.7 MG/DL — SIGNIFICANT CHANGE UP (ref 0.7–1.5)
EGFR: 91 ML/MIN/1.73M2 — SIGNIFICANT CHANGE UP
EGFR: 91 ML/MIN/1.73M2 — SIGNIFICANT CHANGE UP
EOSINOPHIL # BLD AUTO: 0.12 K/UL — SIGNIFICANT CHANGE UP (ref 0–0.7)
EOSINOPHIL NFR BLD AUTO: 1.7 % — SIGNIFICANT CHANGE UP (ref 0–8)
GLUCOSE SERPL-MCNC: 99 MG/DL — SIGNIFICANT CHANGE UP (ref 70–99)
HCT VFR BLD CALC: 28.3 % — LOW (ref 37–47)
HCT VFR BLD CALC: 34.2 % — LOW (ref 37–47)
HGB BLD-MCNC: 11.5 G/DL — LOW (ref 12–16)
HGB BLD-MCNC: 9.5 G/DL — LOW (ref 12–16)
IMM GRANULOCYTES NFR BLD AUTO: 0.6 % — HIGH (ref 0.1–0.3)
LYMPHOCYTES # BLD AUTO: 1.82 K/UL — SIGNIFICANT CHANGE UP (ref 1.2–3.4)
LYMPHOCYTES # BLD AUTO: 25.1 % — SIGNIFICANT CHANGE UP (ref 20.5–51.1)
MAGNESIUM SERPL-MCNC: 2 MG/DL — SIGNIFICANT CHANGE UP (ref 1.8–2.4)
MCHC RBC-ENTMCNC: 28.4 PG — SIGNIFICANT CHANGE UP (ref 27–31)
MCHC RBC-ENTMCNC: 28.6 PG — SIGNIFICANT CHANGE UP (ref 27–31)
MCHC RBC-ENTMCNC: 33.6 G/DL — SIGNIFICANT CHANGE UP (ref 32–37)
MCHC RBC-ENTMCNC: 33.6 G/DL — SIGNIFICANT CHANGE UP (ref 32–37)
MCV RBC AUTO: 84.7 FL — SIGNIFICANT CHANGE UP (ref 81–99)
MCV RBC AUTO: 85.1 FL — SIGNIFICANT CHANGE UP (ref 81–99)
MONOCYTES # BLD AUTO: 0.79 K/UL — HIGH (ref 0.1–0.6)
MONOCYTES NFR BLD AUTO: 10.9 % — HIGH (ref 1.7–9.3)
NEUTROPHILS # BLD AUTO: 4.44 K/UL — SIGNIFICANT CHANGE UP (ref 1.4–6.5)
NEUTROPHILS NFR BLD AUTO: 61.3 % — SIGNIFICANT CHANGE UP (ref 42.2–75.2)
NRBC BLD AUTO-RTO: 0 /100 WBCS — SIGNIFICANT CHANGE UP (ref 0–0)
NRBC BLD AUTO-RTO: 0 /100 WBCS — SIGNIFICANT CHANGE UP (ref 0–0)
PLATELET # BLD AUTO: 239 K/UL — SIGNIFICANT CHANGE UP (ref 130–400)
PLATELET # BLD AUTO: 84 K/UL — LOW (ref 130–400)
PMV BLD: 11.2 FL — HIGH (ref 7.4–10.4)
PMV BLD: 9.3 FL — SIGNIFICANT CHANGE UP (ref 7.4–10.4)
POTASSIUM SERPL-MCNC: 4.3 MMOL/L — SIGNIFICANT CHANGE UP (ref 3.5–5)
POTASSIUM SERPL-SCNC: 4.3 MMOL/L — SIGNIFICANT CHANGE UP (ref 3.5–5)
PROT SERPL-MCNC: 5.6 G/DL — LOW (ref 6–8)
RBC # BLD: 3.34 M/UL — LOW (ref 4.2–5.4)
RBC # BLD: 4.02 M/UL — LOW (ref 4.2–5.4)
RBC # FLD: 14.7 % — HIGH (ref 11.5–14.5)
RBC # FLD: 14.8 % — HIGH (ref 11.5–14.5)
SODIUM SERPL-SCNC: 135 MMOL/L — SIGNIFICANT CHANGE UP (ref 135–146)
WBC # BLD: 7.24 K/UL — SIGNIFICANT CHANGE UP (ref 4.8–10.8)
WBC # BLD: 9.29 K/UL — SIGNIFICANT CHANGE UP (ref 4.8–10.8)
WBC # FLD AUTO: 7.24 K/UL — SIGNIFICANT CHANGE UP (ref 4.8–10.8)
WBC # FLD AUTO: 9.29 K/UL — SIGNIFICANT CHANGE UP (ref 4.8–10.8)

## 2025-05-17 PROCEDURE — 71045 X-RAY EXAM CHEST 1 VIEW: CPT | Mod: 26

## 2025-05-17 PROCEDURE — 99233 SBSQ HOSP IP/OBS HIGH 50: CPT

## 2025-05-17 PROCEDURE — 93010 ELECTROCARDIOGRAM REPORT: CPT

## 2025-05-17 RX ORDER — ACETAMINOPHEN 500 MG/5ML
650 LIQUID (ML) ORAL EVERY 6 HOURS
Refills: 0 | Status: DISCONTINUED | OUTPATIENT
Start: 2025-05-17 | End: 2025-05-18

## 2025-05-17 RX ORDER — LACTULOSE 10 G/15ML
15 SOLUTION ORAL ONCE
Refills: 0 | Status: COMPLETED | OUTPATIENT
Start: 2025-05-17 | End: 2025-05-17

## 2025-05-17 RX ORDER — METOPROLOL SUCCINATE 50 MG/1
12.5 TABLET, EXTENDED RELEASE ORAL EVERY 12 HOURS
Refills: 0 | Status: DISCONTINUED | OUTPATIENT
Start: 2025-05-17 | End: 2025-05-20

## 2025-05-17 RX ADMIN — HEPARIN SODIUM 5000 UNIT(S): 1000 INJECTION INTRAVENOUS; SUBCUTANEOUS at 21:17

## 2025-05-17 RX ADMIN — OXYCODONE HYDROCHLORIDE 5 MILLIGRAM(S): 30 TABLET ORAL at 06:33

## 2025-05-17 RX ADMIN — Medication 20 MILLIEQUIVALENT(S): at 11:53

## 2025-05-17 RX ADMIN — OXYCODONE HYDROCHLORIDE 10 MILLIGRAM(S): 30 TABLET ORAL at 21:17

## 2025-05-17 RX ADMIN — GABAPENTIN 100 MILLIGRAM(S): 400 CAPSULE ORAL at 16:10

## 2025-05-17 RX ADMIN — Medication 500 MILLIGRAM(S): at 06:33

## 2025-05-17 RX ADMIN — FUROSEMIDE 20 MILLIGRAM(S): 10 INJECTION INTRAMUSCULAR; INTRAVENOUS at 06:33

## 2025-05-17 RX ADMIN — Medication 20 MILLIGRAM(S): at 17:22

## 2025-05-17 RX ADMIN — METOPROLOL SUCCINATE 12.5 MILLIGRAM(S): 50 TABLET, EXTENDED RELEASE ORAL at 11:54

## 2025-05-17 RX ADMIN — GABAPENTIN 100 MILLIGRAM(S): 400 CAPSULE ORAL at 06:33

## 2025-05-17 RX ADMIN — FUROSEMIDE 20 MILLIGRAM(S): 10 INJECTION INTRAMUSCULAR; INTRAVENOUS at 17:22

## 2025-05-17 RX ADMIN — POLYETHYLENE GLYCOL 3350 17 GRAM(S): 17 POWDER, FOR SOLUTION ORAL at 11:53

## 2025-05-17 RX ADMIN — Medication 1 APPLICATION(S): at 11:59

## 2025-05-17 RX ADMIN — IPRATROPIUM BROMIDE AND ALBUTEROL SULFATE 3 MILLILITER(S): .5; 2.5 SOLUTION RESPIRATORY (INHALATION) at 02:59

## 2025-05-17 RX ADMIN — Medication 81 MILLIGRAM(S): at 11:53

## 2025-05-17 RX ADMIN — Medication 20 MILLIGRAM(S): at 06:33

## 2025-05-17 RX ADMIN — HEPARIN SODIUM 5000 UNIT(S): 1000 INJECTION INTRAVENOUS; SUBCUTANEOUS at 16:10

## 2025-05-17 RX ADMIN — Medication 500 MILLIGRAM(S): at 17:21

## 2025-05-17 RX ADMIN — ATORVASTATIN CALCIUM 40 MILLIGRAM(S): 80 TABLET, FILM COATED ORAL at 21:17

## 2025-05-17 RX ADMIN — Medication 5 MILLIGRAM(S): at 06:34

## 2025-05-17 RX ADMIN — Medication 650 MILLIGRAM(S): at 17:22

## 2025-05-17 RX ADMIN — LACTULOSE 15 GRAM(S): 10 SOLUTION ORAL at 10:40

## 2025-05-17 RX ADMIN — Medication 2 TABLET(S): at 21:17

## 2025-05-17 RX ADMIN — HEPARIN SODIUM 5000 UNIT(S): 1000 INJECTION INTRAVENOUS; SUBCUTANEOUS at 06:33

## 2025-05-17 NOTE — PROGRESS NOTE ADULT - ASSESSMENT
#2:1 AVB with Mobitz 2nd degree Type I  #MR s/p MV repair, POD #4  #HTN  #COPD  #AFlutter  #Pleural effusion    Rec  - Monitor on tele  - Monitor lytes. Keep K>4 and Mg>2  - Start low dose BB  - Recurrence of Atrial flutter noted. CHADS VASC of at least 3 (HTN, Age > 65, F). Full dose of anticoagulation is recommended for thromboembolic prophylaxis once cleared by surgery  - will follow up tomorrow to determine the need for PPM in case if patient has tachy-chel syndrome

## 2025-05-17 NOTE — PROGRESS NOTE ADULT - SUBJECTIVE AND OBJECTIVE BOX
OPERATIVE PROCEDURE(s):   mvr             POD # 5    SURGEON(s): elenita    SUBJECTIVE ASSESSMENT:     Vital Signs Last 24 Hrs  T(C): 36.7 (17 May 2025 00:00), Max: 36.8 (16 May 2025 12:00)  T(F): 98.1 (17 May 2025 00:00), Max: 98.3 (16 May 2025 12:00)  HR: 86 (17 May 2025 05:00) (68 - 96)  BP: 120/71 (17 May 2025 05:00) (94/63 - 139/70)  BP(mean): 90 (17 May 2025 05:00) (68 - 98)  RR: 17 (17 May 2025 05:00) (17 - 41)  SpO2: 94% (17 May 2025 05:00) (93% - 99%)    Parameters below as of 17 May 2025 05:00  Patient On (Oxygen Delivery Method): room air      05-16-25 @ 07:01  -  05-17-25 @ 07:00  --------------------------------------------------------  IN: 820 mL / OUT: 600 mL / NET: 220 mL      Physical Exam:  General: NAD; A&Ox3  Cardiac: S1/S2, RRR, no murmur, no rubs  Lungs: unlabored respirations, CTA b/l, no wheeze, no rales, no crackles  Abdomen: Soft/NT/ND; positive bowel sounds x 4  Sternum: Intact, no click, incision healing well with no drainage  Incisions: Incisions clean/dry/intact  Extremities: No edema b/l lower extremities; good capillary refill; no cyanosis; palpable 1+ pedal pulses b/l      LABS:                        9.5    7.24  )-----------( 84       ( 17 May 2025 01:46 )             28.3     COUMADIN:   [ ] YES [ x] NO      05-17    135  |  99  |  23[H]  ----------------------------<  99  4.3   |  25  |  0.7    Ca    8.4      17 May 2025 01:46  Mg     2.0     05-17    TPro  5.6[L]  /  Alb  3.3[L]  /  TBili  0.4  /  DBili  x   /  AST  16  /  ALT  9   /  AlkPhos  46  05-17    Urinalysis Basic - ( 17 May 2025 01:46 )    Color: x / Appearance: x / SG: x / pH: x  Gluc: 99 mg/dL / Ketone: x  / Bili: x / Urobili: x   Blood: x / Protein: x / Nitrite: x   Leuk Esterase: x / RBC: x / WBC x   Sq Epi: x / Non Sq Epi: x / Bacteria: x        MEDICATIONS  (STANDING):  ascorbic acid 500 milliGRAM(s) Oral two times a day  aspirin enteric coated 81 milliGRAM(s) Oral daily  atorvastatin 40 milliGRAM(s) Oral at bedtime  bisacodyl 5 milliGRAM(s) Oral every 12 hours  bisacodyl Suppository 10 milliGRAM(s) Rectal once  chlorhexidine 2% Cloths 1 Application(s) Topical daily  dextrose 5%. 1000 milliLiter(s) (50 mL/Hr) IV Continuous <Continuous>  dextrose 5%. 1000 milliLiter(s) (100 mL/Hr) IV Continuous <Continuous>  famotidine    Tablet 20 milliGRAM(s) Oral two times a day  furosemide   Injectable 20 milliGRAM(s) IV Push every 12 hours  furosemide   Injectable 20 milliGRAM(s) IV Push once  gabapentin 100 milliGRAM(s) Oral every 8 hours  glucagon  Injectable 1 milliGRAM(s) IntraMuscular once  heparin   Injectable 5000 Unit(s) SubCutaneous every 8 hours  meperidine     Injectable 25 milliGRAM(s) IV Push once  polyethylene glycol 3350 17 Gram(s) Oral daily  potassium chloride    Tablet ER 20 milliEquivalent(s) Oral daily  senna 2 Tablet(s) Oral at bedtime    MEDICATIONS  (PRN):  fentaNYL    Injectable 25 MICROGram(s) IV Push every 4 hours PRN Severe Pain (7 - 10)  oxyCODONE    IR 5 milliGRAM(s) Oral every 4 hours PRN Moderate Pain (4 - 6)  oxyCODONE    IR 10 milliGRAM(s) Oral every 4 hours PRN Severe Pain (7 - 10)      Allergies    No Known Allergies    Intolerances        Ambulation/Activity Status:        RADIOLOGY & ADDITIONAL TESTS:        Assessment/Plan:  74y Female status-post MVR POD#5  - Case and plan discussed with CTU Intensivist and CT Surgeon - Dr. Guillaume  - Continue CTU supportive care and ongoing plan of care as per continuing CTU rounds.   - Continue DVT/GI prophylaxis  - Incentive Spirometry 10 times an hour  - Continue to advance physical activity as tolerated and continue PT/OT as directed  1. MVR:  Continue ASA, statin, BB held d/t bradycardia. replete electrolytes PRN. pain control  2. HTN: Continue lopressor as tolerated by HR/BP  3. Post-op A. Fib ppx: monitor electrolytes, bb when pt can tolerate, vitamin c- pt went into a. flutter and was unable to give any AV quyen blocking agents due to heart block- EP is following pateint  4. POST-op Hypoxia: Coughing and deep breathing exercises/incentive spirometry encouraged/chest PT  5. DM/Glucose Control: FS ACHS with coverage for glycemic control  6. High grade AV block: EP consulted, Recs appreciated. Will monitor for now  7 b/l pleural effusions right > left- s/p thoracentesis draining 450 blood tinged fluid       Social Service Disposition:  Home with home care    OPERATIVE PROCEDURE(s):   mvr             POD # 5    SURGEON(s): elenita    SUBJECTIVE ASSESSMENT: pt is without complaints but developed rapid atrial flutter and then converted to SR    Vital Signs Last 24 Hrs  T(C): 36.7 (17 May 2025 00:00), Max: 36.8 (16 May 2025 12:00)  T(F): 98.1 (17 May 2025 00:00), Max: 98.3 (16 May 2025 12:00)  HR: 86 (17 May 2025 05:00) (68 - 96)  BP: 120/71 (17 May 2025 05:00) (94/63 - 139/70)  BP(mean): 90 (17 May 2025 05:00) (68 - 98)  RR: 17 (17 May 2025 05:00) (17 - 41)  SpO2: 94% (17 May 2025 05:00) (93% - 99%)    Parameters below as of 17 May 2025 05:00  Patient On (Oxygen Delivery Method): room air      05-16-25 @ 07:01  -  05-17-25 @ 07:00  --------------------------------------------------------  IN: 820 mL / OUT: 600 mL / NET: 220 mL      Physical Exam:  General: NAD; A&Ox3  Cardiac: S1/S2, RRR, no murmur, no rubs  Lungs: unlabored respirations, CTA b/l, no wheeze, no rales, no crackles  Abdomen: Soft/NT/ND; positive bowel sounds x 4  Sternum: Intact, no click, incision healing well with no drainage  Incisions: Incisions clean/dry/intact  Extremities: No edema b/l lower extremities; good capillary refill; no cyanosis; palpable 1+ pedal pulses b/l      LABS:                        9.5    7.24  )-----------( 84       ( 17 May 2025 01:46 )             28.3     COUMADIN:   [ ] YES [ x] NO      05-17    135  |  99  |  23[H]  ----------------------------<  99  4.3   |  25  |  0.7    Ca    8.4      17 May 2025 01:46  Mg     2.0     05-17    TPro  5.6[L]  /  Alb  3.3[L]  /  TBili  0.4  /  DBili  x   /  AST  16  /  ALT  9   /  AlkPhos  46  05-17    Urinalysis Basic - ( 17 May 2025 01:46 )    Color: x / Appearance: x / SG: x / pH: x  Gluc: 99 mg/dL / Ketone: x  / Bili: x / Urobili: x   Blood: x / Protein: x / Nitrite: x   Leuk Esterase: x / RBC: x / WBC x   Sq Epi: x / Non Sq Epi: x / Bacteria: x        MEDICATIONS  (STANDING):  ascorbic acid 500 milliGRAM(s) Oral two times a day  aspirin enteric coated 81 milliGRAM(s) Oral daily  atorvastatin 40 milliGRAM(s) Oral at bedtime  bisacodyl 5 milliGRAM(s) Oral every 12 hours  bisacodyl Suppository 10 milliGRAM(s) Rectal once  chlorhexidine 2% Cloths 1 Application(s) Topical daily  dextrose 5%. 1000 milliLiter(s) (50 mL/Hr) IV Continuous <Continuous>  dextrose 5%. 1000 milliLiter(s) (100 mL/Hr) IV Continuous <Continuous>  famotidine    Tablet 20 milliGRAM(s) Oral two times a day  furosemide   Injectable 20 milliGRAM(s) IV Push every 12 hours  furosemide   Injectable 20 milliGRAM(s) IV Push once  gabapentin 100 milliGRAM(s) Oral every 8 hours  glucagon  Injectable 1 milliGRAM(s) IntraMuscular once  heparin   Injectable 5000 Unit(s) SubCutaneous every 8 hours  meperidine     Injectable 25 milliGRAM(s) IV Push once  polyethylene glycol 3350 17 Gram(s) Oral daily  potassium chloride    Tablet ER 20 milliEquivalent(s) Oral daily  senna 2 Tablet(s) Oral at bedtime    MEDICATIONS  (PRN):  fentaNYL    Injectable 25 MICROGram(s) IV Push every 4 hours PRN Severe Pain (7 - 10)  oxyCODONE    IR 5 milliGRAM(s) Oral every 4 hours PRN Moderate Pain (4 - 6)  oxyCODONE    IR 10 milliGRAM(s) Oral every 4 hours PRN Severe Pain (7 - 10)      Allergies    No Known Allergies    Intolerances        Ambulation/Activity Status:    amb well with pt    RADIOLOGY & ADDITIONAL TESTS:    ACC: [id number] EXAM:  XR CHEST PORTABLE ROUTINE 1V   ORDERED BY: LEYLA OMER     PROCEDURE DATE:  05/16/2025      INTERPRETATION:  CLINICAL HISTORY: Pleural effusion.    COMPARISON: Chest radiograph dated May 15, 2025.    TECHNIQUE: Portable frontal chest radiograph.    FINDINGS:    Support devices: Right IJ central venous catheter in place. Overlying EKG   leads.    Cardiac/mediastinum/hilum: Stable.    Lung parenchyma/Pleura: Unchanged bilateral opacities. Unchanged small   left basilaropacity/pleural effusion. No pneumothorax.    Skeleton/soft tissues: Stable.      IMPRESSION:    1. Unchanged bilateral opacities with unchanged small left basilar   opacity/pleural effusion.    --- End of Report ---        Assessment/Plan:  74y Female status-post MVR POD#5  - Case and plan discussed with CTU Intensivist and CT Surgeon - Dr. Guillaume  - Continue CTU supportive care and ongoing plan of care as per continuing CTU rounds.   - Continue DVT/GI prophylaxis  - Incentive Spirometry 10 times an hour  - Continue to advance physical activity as tolerated and continue PT/OT as directed  1. MVR:  Continue ASA, statin, BB held d/t bradycardia. replete electrolytes PRN. pain control  2. HTN: Continue lopressor as tolerated by HR/BP  3. Post-op A. Fib ppx: monitor electrolytes, bb when pt can tolerate, vitamin c- pt went into a. flutter and was unable to give any AV quyen blocking agents due to heart block- EP is following and now recommends beta-blocker for rate control of a. flutter ; if pt develops worsening bradycardia then she may need a ppm prior to d/c ; if rhythm remains stable then she should go home with [facility]  4. POST-op Hypoxia: Coughing and deep breathing exercises/incentive spirometry encouraged/chest PT  5. DM/Glucose Control: FS ACHS with coverage for glycemic control  6. High grade AV block: EP consulted, Recs appreciated. Will monitor for now  7 b/l pleural effusions right > left- s/p thoracentesis draining 450 blood tinged fluid   anticoagulation to be started only after pacing wires are removed    Social Service Disposition:  Home with home care prob early next week

## 2025-05-17 NOTE — PROGRESS NOTE ADULT - SUBJECTIVE AND OBJECTIVE BOX
INTERVAL HPI/OVERNIGHT EVENTS:  POD#5 s/p MVR  Resting comfortably.     MEDICATIONS  (STANDING):  ascorbic acid 500 milliGRAM(s) Oral two times a day  aspirin enteric coated 81 milliGRAM(s) Oral daily  atorvastatin 40 milliGRAM(s) Oral at bedtime  bisacodyl 5 milliGRAM(s) Oral every 12 hours  bisacodyl Suppository 10 milliGRAM(s) Rectal once  chlorhexidine 2% Cloths 1 Application(s) Topical daily  dextrose 5%. 1000 milliLiter(s) (50 mL/Hr) IV Continuous <Continuous>  dextrose 5%. 1000 milliLiter(s) (100 mL/Hr) IV Continuous <Continuous>  famotidine    Tablet 20 milliGRAM(s) Oral two times a day  furosemide   Injectable 20 milliGRAM(s) IV Push every 12 hours  furosemide   Injectable 20 milliGRAM(s) IV Push once  gabapentin 100 milliGRAM(s) Oral every 8 hours  glucagon  Injectable 1 milliGRAM(s) IntraMuscular once  heparin   Injectable 5000 Unit(s) SubCutaneous every 8 hours  meperidine     Injectable 25 milliGRAM(s) IV Push once  metoprolol tartrate 12.5 milliGRAM(s) Oral every 12 hours  polyethylene glycol 3350 17 Gram(s) Oral daily  potassium chloride    Tablet ER 20 milliEquivalent(s) Oral daily  senna 2 Tablet(s) Oral at bedtime    MEDICATIONS  (PRN):  fentaNYL    Injectable 25 MICROGram(s) IV Push every 4 hours PRN Severe Pain (7 - 10)  oxyCODONE    IR 5 milliGRAM(s) Oral every 4 hours PRN Moderate Pain (4 - 6)  oxyCODONE    IR 10 milliGRAM(s) Oral every 4 hours PRN Severe Pain (7 - 10)      Allergies    No Known Allergies    Intolerances        REVIEW OF SYSTEMS    [ ] A ten-point review of systems was otherwise negative except as noted.  [ ] Due to altered mental status/intubation, subjective information were not able to be obtained from the patient. History was obtained, to the extent possible, from review of the chart and collateral sources of information.      Vital Signs Last 24 Hrs  T(C): 36.4 (17 May 2025 12:00), Max: 36.7 (16 May 2025 16:00)  T(F): 97.6 (17 May 2025 12:00), Max: 98.1 (16 May 2025 16:00)  HR: 67 (17 May 2025 12:00) (59 - 93)  BP: 120/67 (17 May 2025 12:00) (90/68 - 139/70)  BP(mean): 87 (17 May 2025 12:00) (68 - 98)  RR: 25 (17 May 2025 12:00) (17 - 41)  SpO2: 94% (17 May 2025 12:00) (94% - 99%)    Parameters below as of 17 May 2025 12:00  Patient On (Oxygen Delivery Method): room air        05-16-25 @ 07:01  -  05-17-25 @ 07:00  --------------------------------------------------------  IN: 820 mL / OUT: 1400 mL / NET: -580 mL        PHYSICAL EXAM:    GENERAL: In no apparent distress, well nourished, and hydrated.  HEART: Regular rate and rhythm;  PULMONARY: Clear to auscultation  NEUROLOGICAL: alert & oriented x 3, no focal deficits, PERRLA, EOMI    LABS:                        9.5    7.24  )-----------( 84       ( 17 May 2025 01:46 )             28.3     05-17    135  |  99  |  23[H]  ----------------------------<  99  4.3   |  25  |  0.7    Ca    8.4      17 May 2025 01:46  Mg     2.0     05-17    TPro  5.6[L]  /  Alb  3.3[L]  /  TBili  0.4  /  DBili  x   /  AST  16  /  ALT  9   /  AlkPhos  46  05-17      Urinalysis Basic - ( 17 May 2025 01:46 )    Color: x / Appearance: x / SG: x / pH: x  Gluc: 99 mg/dL / Ketone: x  / Bili: x / Urobili: x   Blood: x / Protein: x / Nitrite: x   Leuk Esterase: x / RBC: x / WBC x   Sq Epi: x / Non Sq Epi: x / Bacteria: x          12 Lead ECG:   Ventricular Rate 78 BPM    Atrial Rate 78 BPM    P-R Interval 344 ms    QRS Duration 82 ms    Q-T Interval 384 ms    QTC Calculation(Bazett) 437 ms    P Axis 37 degrees    R Axis 6 degrees    T Axis 20 degrees    Diagnosis Line Sinus rhythm with 1st degree A-V block  Nonspecific ST abnormality  Abnormal ECG    Confirmed by Esau Cohen ([id number]) on 5/15/2025 8:00:28 AM (05-15-25 @ 07:37)  12 Lead ECG:   Ventricular Rate 53 BPM    Atrial Rate 53 BPM    P-R Interval 360 ms    QRS Duration 94 ms    Q-T Interval 426 ms    QTC Calculation(Bazett) 399 ms    P Axis 48 degrees    R Axis 14 degrees    T Axis 2 degrees    Diagnosis Line Sinus bradycardia with marked sinus arrhythmia with 1st degree A-V block  Nonspecific ST abnormality  Abnormal ECG    Confirmed by TAMI ALVAREZ, [facility] ([id number]) on 5/14/2025 10:47:00 PM (05-14-25 @ 14:11)      RADIOLOGY & ADDITIONAL TESTS:

## 2025-05-17 NOTE — PROGRESS NOTE ADULT - SUBJECTIVE AND OBJECTIVE BOX
TAMIKO MASON  MRN#: [id number]  Subjective:  Patient was seen and evalauted on AM rounds offerring no specific compliants at this time.    OBJECTIVE:  ICU Vital Signs Last 24 Hrs  T(C): 36.4 (17 May 2025 12:00), Max: 36.7 (16 May 2025 16:00)  T(F): 97.6 (17 May 2025 12:00), Max: 98.1 (16 May 2025 16:00)  HR: 69 (17 May 2025 13:00) (59 - 93)  BP: 103/61 (17 May 2025 13:00) (90/68 - 139/70)  BP(mean): 78 (17 May 2025 13:00) (68 - 98)  ABP: --  ABP(mean): --  RR: 20 (17 May 2025 13:00) (17 - 41)  SpO2: 95% (17 May 2025 13:00) (94% - 99%)    O2 Parameters below as of 17 May 2025 13:00  Patient On (Oxygen Delivery Method): room air            05-16 @ 07:01  -  05-17 @ 07:00  --------------------------------------------------------  IN: 820 mL / OUT: 1400 mL / NET: -580 mL      CAPILLARY BLOOD GLUCOSE          PHYSICAL EXAM:Daily     Daily Weight in k.7 (17 May 2025 05:00)  General: WN/WD NAD    HEENT:     + NCAT  + EOMI  - Conjuctival edema   - Icterus   - Thrush   - ETT  - NGT/OGT    Neck:         + FROM    - JVD     - Nodes     - Masses    + Mid-line trachea   - Tracheostomy    Chest:         - Sternal click  - Sternal drainage  + Pacing wires  - Chest tubes  - SubQ emphysema    Lungs:          + CTA   - Rhonchi    - Rales    - Wheezing     - Decreased BS   - Dullness R L    Cardiac:       + S1 + S2    + RRR   - Irregular   - S3  - S4    - Murmurs   - Rub   - Hamman’s sign     Abdomen:    + BS     + Soft    + Non-tender     - Distended    - Organomegaly  - PEG    Extremities:   - Cyanosis U/L   - Clubbing  U/L  - LE/UE Edema   + Capillary refill    + Pulses     Neuro:        + Awake   +  Alert   - Confused   - Lethargic   - Sedated   - Generalized Weakness    Skin:        - Rashes    - Erythema   + Normal incisions   + IV sites intact  - Sacral decubitus    HOSPITAL MEDICATIONS:  MEDICATIONS  (STANDING):  ascorbic acid 500 milliGRAM(s) Oral two times a day  aspirin enteric coated 81 milliGRAM(s) Oral daily  atorvastatin 40 milliGRAM(s) Oral at bedtime  bisacodyl 5 milliGRAM(s) Oral every 12 hours  bisacodyl Suppository 10 milliGRAM(s) Rectal once  chlorhexidine 2% Cloths 1 Application(s) Topical daily  dextrose 5%. 1000 milliLiter(s) (50 mL/Hr) IV Continuous <Continuous>  dextrose 5%. 1000 milliLiter(s) (100 mL/Hr) IV Continuous <Continuous>  famotidine    Tablet 20 milliGRAM(s) Oral two times a day  furosemide   Injectable 20 milliGRAM(s) IV Push every 12 hours  furosemide   Injectable 20 milliGRAM(s) IV Push once  gabapentin 100 milliGRAM(s) Oral every 8 hours  glucagon  Injectable 1 milliGRAM(s) IntraMuscular once  heparin   Injectable 5000 Unit(s) SubCutaneous every 8 hours  meperidine     Injectable 25 milliGRAM(s) IV Push once  metoprolol tartrate 12.5 milliGRAM(s) Oral every 12 hours  polyethylene glycol 3350 17 Gram(s) Oral daily  potassium chloride    Tablet ER 20 milliEquivalent(s) Oral daily  senna 2 Tablet(s) Oral at bedtime    MEDICATIONS  (PRN):  fentaNYL    Injectable 25 MICROGram(s) IV Push every 4 hours PRN Severe Pain (7 - 10)  oxyCODONE    IR 5 milliGRAM(s) Oral every 4 hours PRN Moderate Pain (4 - 6)  oxyCODONE    IR 10 milliGRAM(s) Oral every 4 hours PRN Severe Pain (7 - 10)      LABS:                        9.5    7.24  )-----------( 84       ( 17 May 2025 01:46 )             28.3        135  |  99  |  23[H]  ----------------------------<  99  4.3   |  25  |  0.7    Ca    8.4      17 May 2025 01:46  Mg     2.0         TPro  5.6[L]  /  Alb  3.3[L]  /  TBili  0.4  /  DBili  x   /  AST  16  /  ALT  9   /  AlkPhos  46       LIVER FUNCTIONS - ( 17 May 2025 01:46 )  Alb: 3.3 g/dL / Pro: 5.6 g/dL / ALK PHOS: 46 U/L / ALT: 9 U/L / AST: 16 U/L / GGT: x           Urinalysis Basic - ( 17 May 2025 01:46 )    Color: x / Appearance: x / SG: x / pH: x  Gluc: 99 mg/dL / Ketone: x  / Bili: x / Urobili: x   Blood: x / Protein: x / Nitrite: x   Leuk Esterase: x / RBC: x / WBC x   Sq Epi: x / Non Sq Epi: x / Bacteria: x        RADIOLOGY:  X Reviewed and interpreted by me: bilateral opacities    CARDIOPULMONARY DYSFUNCTION  - Respiratory status required supplemental oxygen & the following of continuous pulse oximetry for support & to prevent decompensation  - Continued early mobilization as tolerated  - Addressed analgesic regimen to optimize function    PREVENTION-PROPHYLAXIS  - ASA continued for thromboembolism prophylaxis  - Lipitor was also started   - Heparin continued for VTE prophylaxis in addition to Venodyne boots  - Pepcid maintained for GI bleeding prophylaxis  - Lopressor initiated for atrial fibrillation prophylaxis  - Metabolic stability & infection prophylaxis required review and adjustment of regular Insulin sliding scale and gylcemic regimen while following serial glucose levels to help achieve & maintain euglycemia  - Reviewed & addressed surgical site infection prophylaxis regimen

## 2025-05-17 NOTE — PROGRESS NOTE ADULT - ASSESSMENT
Assessment/Plan:  Mitral Valve Insufficiency-s/p MVR-POD #5  1-BP control-start metoprolol  2-serum glucose control-insulin sliding scale correction regimen  3-fluid overload-diuresis  4-acute blood loss anemia-stable, monitor Hb/Hct daily  0-gznqbrqwlrkghfku-a/u repeat CBC  6-HLD-statin  1-JRTL-ilarglzxqzckvn tx, pulmonary toilet,chest {PT, incentive spirometry, supplemental O2  8-AVB/A-Flutter-start beta-blockers, EP f/u    40 minutes of critical care services provided

## 2025-05-18 LAB
ALBUMIN SERPL ELPH-MCNC: 3.2 G/DL — LOW (ref 3.5–5.2)
ALP SERPL-CCNC: 50 U/L — SIGNIFICANT CHANGE UP (ref 30–115)
ALT FLD-CCNC: 10 U/L — SIGNIFICANT CHANGE UP (ref 0–41)
ANION GAP SERPL CALC-SCNC: 10 MMOL/L — SIGNIFICANT CHANGE UP (ref 7–14)
AST SERPL-CCNC: 17 U/L — SIGNIFICANT CHANGE UP (ref 0–41)
BASOPHILS # BLD AUTO: 0.05 K/UL — SIGNIFICANT CHANGE UP (ref 0–0.2)
BASOPHILS NFR BLD AUTO: 0.6 % — SIGNIFICANT CHANGE UP (ref 0–1)
BILIRUB SERPL-MCNC: 0.4 MG/DL — SIGNIFICANT CHANGE UP (ref 0.2–1.2)
BUN SERPL-MCNC: 22 MG/DL — HIGH (ref 10–20)
CALCIUM SERPL-MCNC: 8.6 MG/DL — SIGNIFICANT CHANGE UP (ref 8.4–10.5)
CHLORIDE SERPL-SCNC: 99 MMOL/L — SIGNIFICANT CHANGE UP (ref 98–110)
CO2 SERPL-SCNC: 24 MMOL/L — SIGNIFICANT CHANGE UP (ref 17–32)
CREAT SERPL-MCNC: 0.8 MG/DL — SIGNIFICANT CHANGE UP (ref 0.7–1.5)
EGFR: 78 ML/MIN/1.73M2 — SIGNIFICANT CHANGE UP
EGFR: 78 ML/MIN/1.73M2 — SIGNIFICANT CHANGE UP
EOSINOPHIL # BLD AUTO: 0.21 K/UL — SIGNIFICANT CHANGE UP (ref 0–0.7)
EOSINOPHIL NFR BLD AUTO: 2.7 % — SIGNIFICANT CHANGE UP (ref 0–8)
GLUCOSE SERPL-MCNC: 98 MG/DL — SIGNIFICANT CHANGE UP (ref 70–99)
HCT VFR BLD CALC: 29.2 % — LOW (ref 37–47)
HGB BLD-MCNC: 9.8 G/DL — LOW (ref 12–16)
IMM GRANULOCYTES NFR BLD AUTO: 0.6 % — HIGH (ref 0.1–0.3)
LYMPHOCYTES # BLD AUTO: 2.07 K/UL — SIGNIFICANT CHANGE UP (ref 1.2–3.4)
LYMPHOCYTES # BLD AUTO: 26.8 % — SIGNIFICANT CHANGE UP (ref 20.5–51.1)
MAGNESIUM SERPL-MCNC: 1.7 MG/DL — LOW (ref 1.8–2.4)
MCHC RBC-ENTMCNC: 28.6 PG — SIGNIFICANT CHANGE UP (ref 27–31)
MCHC RBC-ENTMCNC: 33.6 G/DL — SIGNIFICANT CHANGE UP (ref 32–37)
MCV RBC AUTO: 85.1 FL — SIGNIFICANT CHANGE UP (ref 81–99)
MONOCYTES # BLD AUTO: 0.86 K/UL — HIGH (ref 0.1–0.6)
MONOCYTES NFR BLD AUTO: 11.1 % — HIGH (ref 1.7–9.3)
NEUTROPHILS # BLD AUTO: 4.48 K/UL — SIGNIFICANT CHANGE UP (ref 1.4–6.5)
NEUTROPHILS NFR BLD AUTO: 58.2 % — SIGNIFICANT CHANGE UP (ref 42.2–75.2)
NRBC BLD AUTO-RTO: 0 /100 WBCS — SIGNIFICANT CHANGE UP (ref 0–0)
PLATELET # BLD AUTO: 197 K/UL — SIGNIFICANT CHANGE UP (ref 130–400)
PMV BLD: 10 FL — SIGNIFICANT CHANGE UP (ref 7.4–10.4)
POTASSIUM SERPL-MCNC: 3.8 MMOL/L — SIGNIFICANT CHANGE UP (ref 3.5–5)
POTASSIUM SERPL-SCNC: 3.8 MMOL/L — SIGNIFICANT CHANGE UP (ref 3.5–5)
PROT SERPL-MCNC: 5.3 G/DL — LOW (ref 6–8)
RBC # BLD: 3.43 M/UL — LOW (ref 4.2–5.4)
RBC # FLD: 14.6 % — HIGH (ref 11.5–14.5)
SODIUM SERPL-SCNC: 133 MMOL/L — LOW (ref 135–146)
WBC # BLD: 7.72 K/UL — SIGNIFICANT CHANGE UP (ref 4.8–10.8)
WBC # FLD AUTO: 7.72 K/UL — SIGNIFICANT CHANGE UP (ref 4.8–10.8)

## 2025-05-18 PROCEDURE — 99291 CRITICAL CARE FIRST HOUR: CPT

## 2025-05-18 PROCEDURE — 93010 ELECTROCARDIOGRAM REPORT: CPT

## 2025-05-18 PROCEDURE — 71045 X-RAY EXAM CHEST 1 VIEW: CPT | Mod: 26

## 2025-05-18 PROCEDURE — 99233 SBSQ HOSP IP/OBS HIGH 50: CPT

## 2025-05-18 RX ORDER — ONDANSETRON HCL/PF 4 MG/2 ML
4 VIAL (ML) INJECTION ONCE
Refills: 0 | Status: COMPLETED | OUTPATIENT
Start: 2025-05-18 | End: 2025-05-18

## 2025-05-18 RX ORDER — ACETAMINOPHEN 500 MG/5ML
1000 LIQUID (ML) ORAL ONCE
Refills: 0 | Status: COMPLETED | OUTPATIENT
Start: 2025-05-18 | End: 2025-05-18

## 2025-05-18 RX ORDER — MAGNESIUM SULFATE 500 MG/ML
2 SYRINGE (ML) INJECTION ONCE
Refills: 0 | Status: COMPLETED | OUTPATIENT
Start: 2025-05-18 | End: 2025-05-18

## 2025-05-18 RX ADMIN — FUROSEMIDE 20 MILLIGRAM(S): 10 INJECTION INTRAMUSCULAR; INTRAVENOUS at 17:43

## 2025-05-18 RX ADMIN — POLYETHYLENE GLYCOL 3350 17 GRAM(S): 17 POWDER, FOR SOLUTION ORAL at 12:30

## 2025-05-18 RX ADMIN — Medication 400 MILLIGRAM(S): at 21:14

## 2025-05-18 RX ADMIN — Medication 4 MILLIGRAM(S): at 07:42

## 2025-05-18 RX ADMIN — METOPROLOL SUCCINATE 12.5 MILLIGRAM(S): 50 TABLET, EXTENDED RELEASE ORAL at 17:41

## 2025-05-18 RX ADMIN — Medication 81 MILLIGRAM(S): at 12:30

## 2025-05-18 RX ADMIN — HEPARIN SODIUM 5000 UNIT(S): 1000 INJECTION INTRAVENOUS; SUBCUTANEOUS at 21:14

## 2025-05-18 RX ADMIN — Medication 5 MILLIGRAM(S): at 17:41

## 2025-05-18 RX ADMIN — Medication 20 MILLIEQUIVALENT(S): at 12:30

## 2025-05-18 RX ADMIN — OXYCODONE HYDROCHLORIDE 5 MILLIGRAM(S): 30 TABLET ORAL at 15:39

## 2025-05-18 RX ADMIN — Medication 25 GRAM(S): at 06:16

## 2025-05-18 RX ADMIN — Medication 2 TABLET(S): at 21:15

## 2025-05-18 RX ADMIN — Medication 20 MILLIGRAM(S): at 17:42

## 2025-05-18 RX ADMIN — Medication 1000 MILLIGRAM(S): at 23:32

## 2025-05-18 RX ADMIN — FUROSEMIDE 20 MILLIGRAM(S): 10 INJECTION INTRAMUSCULAR; INTRAVENOUS at 05:59

## 2025-05-18 RX ADMIN — Medication 5 MILLIGRAM(S): at 05:59

## 2025-05-18 RX ADMIN — OXYCODONE HYDROCHLORIDE 10 MILLIGRAM(S): 30 TABLET ORAL at 00:37

## 2025-05-18 RX ADMIN — ATORVASTATIN CALCIUM 40 MILLIGRAM(S): 80 TABLET, FILM COATED ORAL at 21:14

## 2025-05-18 RX ADMIN — Medication 40 MILLIEQUIVALENT(S): at 06:16

## 2025-05-18 RX ADMIN — Medication 20 MILLIGRAM(S): at 05:59

## 2025-05-18 RX ADMIN — HEPARIN SODIUM 5000 UNIT(S): 1000 INJECTION INTRAVENOUS; SUBCUTANEOUS at 05:59

## 2025-05-18 RX ADMIN — Medication 1000 MILLIGRAM(S): at 13:50

## 2025-05-18 RX ADMIN — OXYCODONE HYDROCHLORIDE 5 MILLIGRAM(S): 30 TABLET ORAL at 16:09

## 2025-05-18 RX ADMIN — METOPROLOL SUCCINATE 12.5 MILLIGRAM(S): 50 TABLET, EXTENDED RELEASE ORAL at 05:59

## 2025-05-18 RX ADMIN — HEPARIN SODIUM 5000 UNIT(S): 1000 INJECTION INTRAVENOUS; SUBCUTANEOUS at 13:00

## 2025-05-18 RX ADMIN — Medication 400 MILLIGRAM(S): at 13:21

## 2025-05-18 NOTE — PROGRESS NOTE ADULT - ASSESSMENT
#2:1 AVB with Mobitz 2nd degree Type I  #MR s/p MV repair, POD #4  #HTN  #COPD  #AFlutter  #Pleural effusion    Rec  - Monitor on tele  - Monitor lytes. Keep K>4 and Mg>2  - c/w low dose BB  - Recurrence of Atrial flutter noted. Patient is currently in rate controlled AFlutter CHADS VASC of at least 3 (HTN, Age > 65, F). Full dose of anticoagulation is recommended for thromboembolic prophylaxis once cleared by CT surgery.   - Recommend CAMRYN/CV once on full dose of AC

## 2025-05-18 NOTE — DISCHARGE NOTE NURSING/CASE MANAGEMENT/SOCIAL WORK - PATIENT PORTAL LINK FT
You can access the FollowMyHealth Patient Portal offered by [facility] by registering at the following website: [email or web address]. By joining [facility]’s FollowMyHealth portal, you will also be able to view your health information using other applications (apps) compatible with our system.

## 2025-05-18 NOTE — DISCHARGE NOTE NURSING/CASE MANAGEMENT/SOCIAL WORK - FINANCIAL ASSISTANCE
[facility] provides services at a reduced cost to those who are determined to be eligible through [facility]’s financial assistance program. Information regarding [facility]’s financial assistance program can be found by going to [email or web address] or by calling [phone].

## 2025-05-18 NOTE — PROGRESS NOTE ADULT - SUBJECTIVE AND OBJECTIVE BOX
CTU Attending Progress Daily Note     Procedure: MVR     He has history of Gastric ulcer, h/o smoking, HTN    Benign essential HTN    Hyperlipidemia    Mitral regurgitation      HPI:73 y/o female with pmx of covid/smoking, HTN and elective MVR      Hospital Course:  5/12 Patient arrived to ICU , sedated on ventilator, large A-a gradient, coagulopathy, recevied 2 units rbc, 2 units platelets, cryo x2 and 26 mcg of DDAVP  5/13 extubated   5/14: POD # 2 - On NC 3 liters O2, EKG- NSR - accelerated junctional - no symptoms, CXR congested - lasix 20 mg BID, OOBTC, DC chest tube after walking  Had low HR/ high degree AV block alternating with first degree - paced at 60/mt - intermittently - EP consulted and monitor at this time - lytes recheck send- Possibly need pacer if no recovery   5/15: POD# 3, MVR - on O2 3 liters - wean off, OOBTC, 2 gram MSSO4/ KCL given, pain well controlled, Increased lasix to 20 mg TID with metalazone 5 mg, negative 1 liter so far    CXR b/l effusion, US showed right effusion - thoracentesis with 450 ml blood stained fluid removed, DC a line, walk around - diuretics to keep negative, labs at 2 pm with CXR follow up     5/18:  On lopressor 12.5 mg BID - Pacer  with v wires ).8 sen, 45/mt, 14 MA, threshold 8   on room air, has right IJ cordis, EKG - a.flutter 4;1 - no further blocks- on BB, plan is to DC wires in am/ MCOT, as per EP CAMRYN cardioversion on 5/19     OBJECTIVE:  ICU Vital Signs Last 24 Hrs  T(C): 37.1 (18 May 2025 08:00), Max: 37.1 (18 May 2025 08:00)  T(F): 98.8 (18 May 2025 08:00), Max: 98.8 (18 May 2025 08:00)  HR: 76 (18 May 2025 13:00) (68 - 77)  BP: 90/59 (18 May 2025 13:00) (84/47 - 120/58)  BP(mean): 68 (18 May 2025 13:00) (61 - 84)  ABP: --  ABP(mean): --  RR: 25 (18 May 2025 13:00) (16 - 33)  SpO2: 97% (18 May 2025 13:00) (94% - 98%)    O2 Parameters below as of 18 May 2025 12:00  Patient On (Oxygen Delivery Method): room air          I&O's Summary    17 May 2025 07:01  -  18 May 2025 07:00  --------------------------------------------------------  IN: 460 mL / OUT: 1200 mL / NET: -740 mL    18 May 2025 07:01  -  18 May 2025 14:45  --------------------------------------------------------  IN: 240 mL / OUT: 0 mL / NET: 240 mL      I&O's Detail    17 May 2025 07:01  -  18 May 2025 07:00  --------------------------------------------------------  IN:    IV PiggyBack: 100 mL    Oral Fluid: 360 mL  Total IN: 460 mL    OUT:    Voided (mL): 1200 mL  Total OUT: 1200 mL    Total NET: -740 mL      18 May 2025 07:01  -  18 May 2025 14:45  --------------------------------------------------------  IN:    Oral Fluid: 240 mL  Total IN: 240 mL    OUT:  Total OUT: 0 mL    Total NET: 240 mL        Adult Advanced Hemodynamics Last 24 Hrs  CVP(mm Hg): --  CVP(cm H2O): --  CO: --  CI: --  PA: --  PA(mean): --  PCWP: --  SVR: --  SVRI: --  PVR: --  PVRI: --    CAPILLARY BLOOD GLUCOSE        LABS:                          9.8    7.72  )-----------( 197      ( 18 May 2025 02:07 )             29.2     05-18    133[L]  |  99  |  22[H]  ----------------------------<  98  3.8   |  24  |  0.8    Ca    8.6      18 May 2025 02:07  Mg     1.7     05-18    TPro  5.3[L]  /  Alb  3.2[L]  /  TBili  0.4  /  DBili  x   /  AST  17  /  ALT  10  /  AlkPhos  50  05-18      Urinalysis Basic - ( 18 May 2025 02:07 )    Color: x / Appearance: x / SG: x / pH: x  Gluc: 98 mg/dL / Ketone: x  / Bili: x / Urobili: x   Blood: x / Protein: x / Nitrite: x   Leuk Esterase: x / RBC: x / WBC x   Sq Epi: x / Non Sq Epi: x / Bacteria: x        Home Medications:  aspirin 81 mg oral tablet: orally once a day (12 May 2025 06:55)  aspirin 81 mg po q day:  (12 May 2025 06:55)  Famotidine: 40 milligram(s) orally once a day (12 May 2025 06:55)  loratadine po q day:  (12 May 2025 06:55)  losartan hctz 50/12.5 mg po q day :  (12 May 2025 06:55)  Meloxicam: 15 milligram(s) orally once a day (12 May 2025 06:55)    HOSPITAL MEDICATIONS:  MEDICATIONS  (STANDING):  acetaminophen   IVPB .. 1000 milliGRAM(s) IV Intermittent once  acetaminophen   IVPB .. 1000 milliGRAM(s) IV Intermittent once  aspirin enteric coated 81 milliGRAM(s) Oral daily  atorvastatin 40 milliGRAM(s) Oral at bedtime  bisacodyl 5 milliGRAM(s) Oral every 12 hours  bisacodyl Suppository 10 milliGRAM(s) Rectal once  chlorhexidine 2% Cloths 1 Application(s) Topical daily  dextrose 5%. 1000 milliLiter(s) (50 mL/Hr) IV Continuous <Continuous>  dextrose 5%. 1000 milliLiter(s) (100 mL/Hr) IV Continuous <Continuous>  famotidine    Tablet 20 milliGRAM(s) Oral two times a day  furosemide   Injectable 20 milliGRAM(s) IV Push every 12 hours  glucagon  Injectable 1 milliGRAM(s) IntraMuscular once  heparin   Injectable 5000 Unit(s) SubCutaneous every 8 hours  meperidine     Injectable 25 milliGRAM(s) IV Push once  metoprolol tartrate 12.5 milliGRAM(s) Oral every 12 hours  ondansetron Injectable 4 milliGRAM(s) IV Push once  polyethylene glycol 3350 17 Gram(s) Oral daily  potassium chloride    Tablet ER 20 milliEquivalent(s) Oral daily  senna 2 Tablet(s) Oral at bedtime    MEDICATIONS  (PRN):  fentaNYL    Injectable 25 MICROGram(s) IV Push every 4 hours PRN Severe Pain (7 - 10)  oxyCODONE    IR 5 milliGRAM(s) Oral every 4 hours PRN Moderate Pain (4 - 6)  oxyCODONE    IR 10 milliGRAM(s) Oral every 4 hours PRN Severe Pain (7 - 10)    RADIOLOGY:  Chest X-ray Reviewed    PHYSICAL EXAM:          CONSTITUTIONAL: in no acute distress.   	SKIN: warm, dry  	HEAD: Normocephalic; atraumatic.  	EYES: PERRL, EOM, no conj injection, sclera clear  	ENT: No nasal discharge; airway clear.  	NECK: Supple; non tender.   	CARD: S1, S2 normal; no murmurs, gallops, or rubs. Regular rate and rhythm.  no carotid bruits  	RESP: CTA B/L; good air movement No wheezes, rales or rhonchi.  	ABD: Soft, not tender, not distended,  no rebound or guarding, bowel sounds present  	EXT: Normal ROM.  No clubbing, cyanosis or edema.   warm and well perfused.  pulses palpable  	NEURO: Alert, awake, motor 5/5 R, 5/5 L    Assessment  73 y/o female sp MVR    Plan:    Neuro:  Multimodal pain management  Tylenol, Lidoderm patch, gabapentin, opiates as needed  Normal neuro status in the post op period    CV:  S/P MVR  Maintain MAP > 65  ASA, statin, lasix BID   CXR congested - pleural effusion right drained 450 ml blood stained fluid on 5/15  AV blocks resolved - had Intermittent high degree block with bradycardia and first degree AV block on 5/14 - paced at 60/mt, EP consulted and monitor and no intervention at that time  Now A.flutter - CAMRYN cardioversion planned on 5/19   Tolerating metoprolol 12.5 mg BID  DC wires in am     Resp:  keep spo2 > 92 on room air   Supplemental oxygen to maintain sats > 92%  Continuous pulse oximetry monitoring  IS / Chest PT  Nebulizers as needed    GI:  Diet   PUD ppx per protocol    Renal  good UOP No JAGDEEP post surgery  Monitor UOP, lytes, creatinine  Diuretics for negative fluid balance  Keep K > 4, Mg > 2    Endo:  Tight glucose control per CTICU protocol  Insulin SSI as needed - no DM     Heme:  s/p Acute blood loss anemia post surgery  High risk for thrombocytopenia  DVT prophylaxis hep sq    ID:  Perioperative prophylactic abx per protocol  Monitor fever curve and WBC count  Remove TLC when no longer indicated  DC a line     Upon my evaluation, the above patient has a high probabillity of imminent or life threatening deterioration due to a high risk for Shock, Cardiogenic shock, arrythmias, acute blood loss, acute kidney injury and acute pulmonary insufficiency which required my direct attention, intervention, and personal management.  Total time was 55 minutes which includes review of radiology results, ordering, interpreting and reviewing diagnostic studies / lab tests with immediate assessment and treatment, consultant collaboration on findings and treatment options, monitoring for potential decompensation, obtaining history from family, EMT, nursing home staff and/or treating physicians; directing the titration of pressors, oxygen support devices, fluid resucitation, interpretation of cardiac output measurements, interpretation of radiological assessments, interpretation of EKG / rhythm strips, telemetry.  This time did not overlap with the management of other patients or performing separately reportable procedures.      Management of this patient was discussed with the CT surgery attending and mid-level providers.    I acknowledge the use of copied documentation.  All copy forward documentation is my own and has been reviewed and revised as appropriate.  If no changes were made, it is because that information remains the same.

## 2025-05-18 NOTE — DISCHARGE NOTE NURSING/CASE MANAGEMENT/SOCIAL WORK - NSDCFUADDAPPT_GEN_ALL_CORE_FT
You have a follow-up appointment with Dr. Guillaume.     Please follow up with your Primary Care Physician / Cardiologist in  2-4 weeks from discharge.    Patient will be discharged with a [employer] monitor to evaluate for potential conduction or rhythm disturbances post TAVR.    Your Care Navigator Nurse Practitioner will be in touch to see you in your home within a few days from discharge. The Follow Your Heart program can help ensure you understand your medications, discharge instructions and answer any questions you may have at that time.

## 2025-05-18 NOTE — DISCHARGE NOTE NURSING/CASE MANAGEMENT/SOCIAL WORK - NSDCPEFALRISK_GEN_ALL_CORE
For information on Fall & Injury Prevention, visit: [email or web address] OR  [email or web address] OR  https://www.cdc.gov/steadi/patient.html

## 2025-05-18 NOTE — PROGRESS NOTE ADULT - SUBJECTIVE AND OBJECTIVE BOX
INTERVAL HPI/OVERNIGHT EVENTS:    POD#6 s/p MVR  Resting comfortably, admits to back pain.  Telemetry - atrial flutter rate controlled    MEDICATIONS  (STANDING):  acetaminophen   IVPB .. 1000 milliGRAM(s) IV Intermittent once  acetaminophen   IVPB .. 1000 milliGRAM(s) IV Intermittent once  aspirin enteric coated 81 milliGRAM(s) Oral daily  atorvastatin 40 milliGRAM(s) Oral at bedtime  bisacodyl 5 milliGRAM(s) Oral every 12 hours  bisacodyl Suppository 10 milliGRAM(s) Rectal once  chlorhexidine 2% Cloths 1 Application(s) Topical daily  dextrose 5%. 1000 milliLiter(s) (50 mL/Hr) IV Continuous <Continuous>  dextrose 5%. 1000 milliLiter(s) (100 mL/Hr) IV Continuous <Continuous>  famotidine    Tablet 20 milliGRAM(s) Oral two times a day  furosemide   Injectable 20 milliGRAM(s) IV Push every 12 hours  glucagon  Injectable 1 milliGRAM(s) IntraMuscular once  heparin   Injectable 5000 Unit(s) SubCutaneous every 8 hours  meperidine     Injectable 25 milliGRAM(s) IV Push once  metoprolol tartrate 12.5 milliGRAM(s) Oral every 12 hours  ondansetron Injectable 4 milliGRAM(s) IV Push once  polyethylene glycol 3350 17 Gram(s) Oral daily  potassium chloride    Tablet ER 20 milliEquivalent(s) Oral daily  senna 2 Tablet(s) Oral at bedtime    MEDICATIONS  (PRN):  fentaNYL    Injectable 25 MICROGram(s) IV Push every 4 hours PRN Severe Pain (7 - 10)  oxyCODONE    IR 5 milliGRAM(s) Oral every 4 hours PRN Moderate Pain (4 - 6)  oxyCODONE    IR 10 milliGRAM(s) Oral every 4 hours PRN Severe Pain (7 - 10)      Allergies    No Known Allergies    Intolerances      REVIEW OF SYSTEMS  [x] A ten-point review of systems was otherwise negative except as noted.  [ ] Due to altered mental status/intubation, subjective information were not able to be obtained from the patient. History was obtained, to the extent possible, from review of the chart and collateral sources of information.      Vital Signs Last 24 Hrs  T(C): 37.1 (18 May 2025 08:00), Max: 37.1 (18 May 2025 08:00)  T(F): 98.8 (18 May 2025 08:00), Max: 98.8 (18 May 2025 08:00)  HR: 76 (18 May 2025 13:00) (68 - 77)  BP: 90/59 (18 May 2025 13:00) (84/47 - 120/58)  BP(mean): 68 (18 May 2025 13:00) (61 - 84)  RR: 25 (18 May 2025 13:00) (16 - 33)  SpO2: 97% (18 May 2025 13:00) (94% - 98%)    Parameters below as of 18 May 2025 12:00  Patient On (Oxygen Delivery Method): room air        05-17-25 @ 07:01  -  05-18-25 @ 07:00  --------------------------------------------------------  IN: 460 mL / OUT: 1200 mL / NET: -740 mL    05-18-25 @ 07:01  -  05-18-25 @ 14:15  --------------------------------------------------------  IN: 240 mL / OUT: 0 mL / NET: 240 mL        PHYSICAL EXAM:  GENERAL: In no apparent distress, well nourished, and hydrated.  HEART: Irregularly irregular  PULMONARY: Clear to auscultation and percussion.   NEUROLOGICAL: alert & oriented x 3, no focal deficits, PERRLA, EOMI    LABS:                        9.8    7.72  )-----------( 197      ( 18 May 2025 02:07 )             29.2     05-18    133[L]  |  99  |  22[H]  ----------------------------<  98  3.8   |  24  |  0.8    Ca    8.6      18 May 2025 02:07  Mg     1.7     05-18    TPro  5.3[L]  /  Alb  3.2[L]  /  TBili  0.4  /  DBili  x   /  AST  17  /  ALT  10  /  AlkPhos  50  05-18      Urinalysis Basic - ( 18 May 2025 02:07 )    Color: x / Appearance: x / SG: x / pH: x  Gluc: 98 mg/dL / Ketone: x  / Bili: x / Urobili: x   Blood: x / Protein: x / Nitrite: x   Leuk Esterase: x / RBC: x / WBC x   Sq Epi: x / Non Sq Epi: x / Bacteria: x          RADIOLOGY & ADDITIONAL TESTS:

## 2025-05-18 NOTE — PROGRESS NOTE ADULT - SUBJECTIVE AND OBJECTIVE BOX
OPERATIVE PROCEDURE(s):                POD #                       74yFemale  SURGEON(s): KALPANA Guillaume  SUBJECTIVE ASSESSMENT:   Vital Signs Last 24 Hrs  T(F): 98.1 (18 May 2025 04:00), Max: 98.1 (18 May 2025 04:00)  HR: 77 (18 May 2025 07:00) (59 - 77)  BP: 112/63 (18 May 2025 07:00) (84/47 - 120/67)  BP(mean): 83 (18 May 2025 07:00) (61 - 87)  RR: 22 (18 May 2025 07:00) (16 - 28)  SpO2: 98% (18 May 2025 07:00) (94% - 98%)      I&O's Detail    17 May 2025 07:01  -  18 May 2025 07:00  --------------------------------------------------------  IN:    IV PiggyBack: 100 mL    Oral Fluid: 360 mL  Total IN: 460 mL    OUT:    Voided (mL): 1200 mL  Total OUT: 1200 mL        Net:   I&O's Detail    16 May 2025 07:01  -  17 May 2025 07:00  --------------------------------------------------------  Total NET: -580 mL      17 May 2025 07:01  -  18 May 2025 07:00  --------------------------------------------------------  Total NET: -740 mL        CAPILLARY BLOOD GLUCOSE        Physical Exam:  General: NAD; A&Ox3/Patient is intubated and sedated  Cardiac: S1/S2, RRR, no murmur, no rubs  Lungs: unlabored respirations, CTA b/l, no wheeze, no rales, no crackles  Abdomen: Soft/NT/ND; positive bowel sounds x 4  Sternum: Intact, no click, incision healing well with no drainage  Incisions: Incisions clean/dry/intact  Extremities: No edema b/l lower extremities; good capillary refill; no cyanosis; palpable 1+ pedal pulses b/l    Central Venous Catheter: Yes[]  No[] , If Yes indication:           Day #  Hunter Catheter: Yes  [] , No  [] , If yes indication:                      Day #  NGT: Yes [] No [] ,    If Yes Placement:                                     Day #  EPICARDIAL WIRES:  [] YES [] NO                                              Day #  BOWEL MOVEMENT:  [] YES [] NO, If No, Timing since last BM:      Day #  CHEST TUBE(Left/Right):  [] YES [] NO, If yes -  AIR LEAKS:  [] YES [] NO        LABS:                        9.8[L]  7.72  )-----------( 197      ( 18 May 2025 02:07 )             29.2[L]                        11.5[L]  9.29  )-----------( 239      ( 17 May 2025 13:53 )             34.2[L]    05-18    133[L]  |  99  |  22[H]  ----------------------------<  98  3.8   |  24  |  0.8  05-17    135  |  99  |  23[H]  ----------------------------<  99  4.3   |  25  |  0.7    Ca    8.6      18 May 2025 02:07  Mg     1.7     05-18    TPro  5.3[L] [6.0 - 8.0]  /  Alb  3.2[L] [3.5 - 5.2]  /  TBili  0.4 [0.2 - 1.2]  /  DBili  x   /  AST  17 [0 - 41]  /  ALT  10 [0 - 41]  /  AlkPhos  50 [30 - 115]  05-18      Urinalysis Basic - ( 18 May 2025 02:07 )    Color: x / Appearance: x / SG: x / pH: x  Gluc: 98 mg/dL / Ketone: x  / Bili: x / Urobili: x   Blood: x / Protein: x / Nitrite: x   Leuk Esterase: x / RBC: x / WBC x   Sq Epi: x / Non Sq Epi: x / Bacteria: x        RADIOLOGY & ADDITIONAL TESTS:  CXR:  EKG:  MEDICATIONS  (STANDING):  aspirin enteric coated 81 milliGRAM(s) Oral daily  atorvastatin 40 milliGRAM(s) Oral at bedtime  bisacodyl 5 milliGRAM(s) Oral every 12 hours  bisacodyl Suppository 10 milliGRAM(s) Rectal once  chlorhexidine 2% Cloths 1 Application(s) Topical daily  dextrose 5%. 1000 milliLiter(s) (50 mL/Hr) IV Continuous <Continuous>  dextrose 5%. 1000 milliLiter(s) (100 mL/Hr) IV Continuous <Continuous>  famotidine    Tablet 20 milliGRAM(s) Oral two times a day  furosemide   Injectable 20 milliGRAM(s) IV Push every 12 hours  furosemide   Injectable 20 milliGRAM(s) IV Push once  glucagon  Injectable 1 milliGRAM(s) IntraMuscular once  heparin   Injectable 5000 Unit(s) SubCutaneous every 8 hours  meperidine     Injectable 25 milliGRAM(s) IV Push once  metoprolol tartrate 12.5 milliGRAM(s) Oral every 12 hours  ondansetron Injectable 4 milliGRAM(s) IV Push once  polyethylene glycol 3350 17 Gram(s) Oral daily  potassium chloride    Tablet ER 20 milliEquivalent(s) Oral daily  senna 2 Tablet(s) Oral at bedtime    MEDICATIONS  (PRN):  acetaminophen     Tablet .. 650 milliGRAM(s) Oral every 6 hours PRN Mild Pain (1 - 3)  fentaNYL    Injectable 25 MICROGram(s) IV Push every 4 hours PRN Severe Pain (7 - 10)  oxyCODONE    IR 5 milliGRAM(s) Oral every 4 hours PRN Moderate Pain (4 - 6)  oxyCODONE    IR 10 milliGRAM(s) Oral every 4 hours PRN Severe Pain (7 - 10)    HEPARIN:  [] YES [] NO  Dose: XX UNITS/HR UNITS Q8H  LOVENOX:[] YES [] NO  Dose: XX mg Q24H  COUMADIN: []  YES [] NO  Dose: XX mg  Q24H  SCD's: YES b/l  GI Prophylaxis: Protonix [], Pepcid [], None [], (Contra-indication:.....)    Post-Op Beta-Blockers: Yes [], No[], If No, then contraindication:  Post-Op Aspirin: Yes [],  No [], If No, then contraindication:  Post-Op Statin: Yes [], No[], If No, then contraindication:  Allergies    No Known Allergies    Intolerances      Ambulation/Activity Status:    Assessment/Plan:  74y Female status-post .....  - Case and plan discussed with CTU Intensivist and CT Surgeon - Dr. Guillaume/Gabriella/Jean  - Continue CTU supportive care    - Continue DVT/GI prophylaxis  - Incentive Spirometry 10 times an hour  - Continue to advance physical activity as tolerated and continue PT/OT as directed  1. CAD: Continue ASA, statin, BB  2. HTN:   3. A. Fib:   4. COPD/Hypoxia:   5. DM/Glucose Control:     Social Service Disposition:     OPERATIVE PROCEDURE(s):  MVR               POD #     6                  74yFemale  SURGEON(s): KALPANA Guillaume  SUBJECTIVE ASSESSMENT: pt seen and examined. no acute complaints at this time.   Vital Signs Last 24 Hrs  T(F): 98.1 (18 May 2025 04:00), Max: 98.1 (18 May 2025 04:00)  HR: 77 (18 May 2025 07:00) (59 - 77)  BP: 112/63 (18 May 2025 07:00) (84/47 - 120/67)  BP(mean): 83 (18 May 2025 07:00) (61 - 87)  RR: 22 (18 May 2025 07:00) (16 - 28)  SpO2: 98% (18 May 2025 07:00) (94% - 98%)      I&O's Detail    17 May 2025 07:01  -  18 May 2025 07:00  --------------------------------------------------------  IN:    IV PiggyBack: 100 mL    Oral Fluid: 360 mL  Total IN: 460 mL    OUT:    Voided (mL): 1200 mL  Total OUT: 1200 mL        Net:   I&O's Detail    16 May 2025 07:01  -  17 May 2025 07:00  --------------------------------------------------------  Total NET: -580 mL      17 May 2025 07:01  -  18 May 2025 07:00  --------------------------------------------------------  Total NET: -740 mL        CAPILLARY BLOOD GLUCOSE        Physical Exam:  General: NAD; A&Ox3, no focal deficits, clear speech   Cardiac: S1/S2, RRR, no murmur, no rubs  Lungs: unlabored respirations, CTA b/l, no wheeze, no rales, no crackles  Abdomen: Soft/NT/ND; positive bowel sounds x 4  Sternum: Intact, no click, incision healing well with no drainage  Incisions: Incisions clean/dry/intact  Extremities: No edema b/l lower extremities; good capillary refill; no cyanosis; palpable 1+ pedal pulses b/l    Central Venous Catheter: Yes[x]  No[] , If Yes indication:     critical      Day #6  EPICARDIAL WIRES:  [x] YES [] NO                                              Day #6  BOWEL MOVEMENT:  [x] YES [] NO, If No, Timing since last BM:      Day #      LABS:                        9.8[L]  7.72  )-----------( 197      ( 18 May 2025 02:07 )             29.2[L]                        11.5[L]  9.29  )-----------( 239      ( 17 May 2025 13:53 )             34.2[L]    05-18    133[L]  |  99  |  22[H]  ----------------------------<  98  3.8   |  24  |  0.8  05-17    135  |  99  |  23[H]  ----------------------------<  99  4.3   |  25  |  0.7    Ca    8.6      18 May 2025 02:07  Mg     1.7     05-18    TPro  5.3[L] [6.0 - 8.0]  /  Alb  3.2[L] [3.5 - 5.2]  /  TBili  0.4 [0.2 - 1.2]  /  DBili  x   /  AST  17 [0 - 41]  /  ALT  10 [0 - 41]  /  AlkPhos  50 [30 - 115]  05-18      Urinalysis Basic - ( 18 May 2025 02:07 )    Color: x / Appearance: x / SG: x / pH: x  Gluc: 98 mg/dL / Ketone: x  / Bili: x / Urobili: x   Blood: x / Protein: x / Nitrite: x   Leuk Esterase: x / RBC: x / WBC x   Sq Epi: x / Non Sq Epi: x / Bacteria: x        RADIOLOGY & ADDITIONAL TESTS:  CXR: < from: Xray Chest 1 View- PORTABLE-Routine (Xray Chest 1 View- PORTABLE-Routine in AM.) (05.18.25 @ 06:19) >  IMPRESSION:    Cardiomegaly with mild CHF. Stable support devices.    < end of copied text >    EKG: < from: 12 Lead ECG (05.15.25 @ 07:37) >    Ventricular Rate 78 BPM    Atrial Rate 78 BPM    P-R Interval 344 ms    QRS Duration 82 ms    Q-T Interval 384 ms    QTC Calculation(Bazett) 437 ms    P Axis 37 degrees    R Axis 6 degrees    T Axis 20 degrees    Diagnosis Line Sinus rhythm with 1st degree A-V block  Nonspecific ST abnormality  Abnormal ECG    < end of copied text >    MEDICATIONS  (STANDING):  aspirin enteric coated 81 milliGRAM(s) Oral daily  atorvastatin 40 milliGRAM(s) Oral at bedtime  bisacodyl 5 milliGRAM(s) Oral every 12 hours  bisacodyl Suppository 10 milliGRAM(s) Rectal once  chlorhexidine 2% Cloths 1 Application(s) Topical daily  dextrose 5%. 1000 milliLiter(s) (50 mL/Hr) IV Continuous <Continuous>  dextrose 5%. 1000 milliLiter(s) (100 mL/Hr) IV Continuous <Continuous>  famotidine    Tablet 20 milliGRAM(s) Oral two times a day  furosemide   Injectable 20 milliGRAM(s) IV Push every 12 hours  furosemide   Injectable 20 milliGRAM(s) IV Push once  glucagon  Injectable 1 milliGRAM(s) IntraMuscular once  heparin   Injectable 5000 Unit(s) SubCutaneous every 8 hours  meperidine     Injectable 25 milliGRAM(s) IV Push once  metoprolol tartrate 12.5 milliGRAM(s) Oral every 12 hours  ondansetron Injectable 4 milliGRAM(s) IV Push once  polyethylene glycol 3350 17 Gram(s) Oral daily  potassium chloride    Tablet ER 20 milliEquivalent(s) Oral daily  senna 2 Tablet(s) Oral at bedtime    MEDICATIONS  (PRN):  acetaminophen     Tablet .. 650 milliGRAM(s) Oral every 6 hours PRN Mild Pain (1 - 3)  fentaNYL    Injectable 25 MICROGram(s) IV Push every 4 hours PRN Severe Pain (7 - 10)  oxyCODONE    IR 5 milliGRAM(s) Oral every 4 hours PRN Moderate Pain (4 - 6)  oxyCODONE    IR 10 milliGRAM(s) Oral every 4 hours PRN Severe Pain (7 - 10)    HEPARIN:  [x] YES [] NO  Dose: 5000 UNITS Q8H  SCD's: YES b/l  GI Prophylaxis: Protonix [], Pepcid [x], None [], (Contra-indication:.....)    Post-Op Beta-Blockers: Yes [x], No[], If No, then contraindication:  Post-Op Aspirin: Yes [x],  No [], If No, then contraindication:  Post-Op Statin: Yes [x], No[], If No, then contraindication:  Allergies    No Known Allergies    Intolerances      Ambulation/Activity Status: ambulate     Assessment/Plan:  74y Female status-post MVR POD#6  - Case and plan discussed with CTU Intensivist and CT Surgeon - Dr. Guillaume  - Continue CTU supportive care and ongoing plan of care as per continuing CTU rounds.   - Continue DVT/GI prophylaxis  - Incentive Spirometry 10 times an hour  - Continue to advance physical activity as tolerated and continue PT/OT as directed  1. MVR:  Continue ASA, statin, low dose bb, replete electrolytes PRN. pain control  2. HTN: Continue lopressor as tolerated by HR/BP  3. Post-op A. Fib ppx: monitor electrolytes, cont low dose bb, vitamin c- pt went into a. flutter- on bb, keep npo after midnight for poss cardioversion, will start eliquis after pacing wires come out ; if pt develops worsening bradycardia then she may need a ppm prior to d/c ; if rhythm remains stable then she should go home with [facility]  4. POST-op Hypoxia: Coughing and deep breathing exercises/incentive spirometry encouraged/chest PT, lasix for noncardiogenic fluid overload   5. DM/Glucose Control: FS ACHS with coverage for glycemic control  6. High grade AV block: EP consulted, Recs appreciated. Will monitor for now  7 b/l pleural effusions right > left- s/p thoracentesis draining 450 blood tinged fluid   anticoagulation to be started only after pacing wires are removed    Social Service Disposition:  Home with home care prob early next week

## 2025-05-19 ENCOUNTER — RESULT REVIEW (OUTPATIENT)
Age: 75
End: 2025-05-19

## 2025-05-19 LAB
ALBUMIN SERPL ELPH-MCNC: 3.1 G/DL — LOW (ref 3.5–5.2)
ALP SERPL-CCNC: 51 U/L — SIGNIFICANT CHANGE UP (ref 30–115)
ALT FLD-CCNC: 10 U/L — SIGNIFICANT CHANGE UP (ref 0–41)
ANION GAP SERPL CALC-SCNC: 12 MMOL/L — SIGNIFICANT CHANGE UP (ref 7–14)
AST SERPL-CCNC: 13 U/L — SIGNIFICANT CHANGE UP (ref 0–41)
BASOPHILS # BLD AUTO: 0.03 K/UL — SIGNIFICANT CHANGE UP (ref 0–0.2)
BASOPHILS NFR BLD AUTO: 0.4 % — SIGNIFICANT CHANGE UP (ref 0–1)
BILIRUB SERPL-MCNC: 0.3 MG/DL — SIGNIFICANT CHANGE UP (ref 0.2–1.2)
BUN SERPL-MCNC: 25 MG/DL — HIGH (ref 10–20)
CALCIUM SERPL-MCNC: 8.2 MG/DL — LOW (ref 8.4–10.5)
CHLORIDE SERPL-SCNC: 97 MMOL/L — LOW (ref 98–110)
CO2 SERPL-SCNC: 25 MMOL/L — SIGNIFICANT CHANGE UP (ref 17–32)
CREAT SERPL-MCNC: 1 MG/DL — SIGNIFICANT CHANGE UP (ref 0.7–1.5)
EGFR: 59 ML/MIN/1.73M2 — LOW
EGFR: 59 ML/MIN/1.73M2 — LOW
EOSINOPHIL # BLD AUTO: 0.17 K/UL — SIGNIFICANT CHANGE UP (ref 0–0.7)
EOSINOPHIL NFR BLD AUTO: 2.4 % — SIGNIFICANT CHANGE UP (ref 0–8)
GLUCOSE SERPL-MCNC: 101 MG/DL — HIGH (ref 70–99)
HCT VFR BLD CALC: 28.2 % — LOW (ref 37–47)
HGB BLD-MCNC: 9.3 G/DL — LOW (ref 12–16)
IMM GRANULOCYTES NFR BLD AUTO: 0.7 % — HIGH (ref 0.1–0.3)
LYMPHOCYTES # BLD AUTO: 1.76 K/UL — SIGNIFICANT CHANGE UP (ref 1.2–3.4)
LYMPHOCYTES # BLD AUTO: 25.1 % — SIGNIFICANT CHANGE UP (ref 20.5–51.1)
MAGNESIUM SERPL-MCNC: 1.8 MG/DL — SIGNIFICANT CHANGE UP (ref 1.8–2.4)
MCHC RBC-ENTMCNC: 28.4 PG — SIGNIFICANT CHANGE UP (ref 27–31)
MCHC RBC-ENTMCNC: 33 G/DL — SIGNIFICANT CHANGE UP (ref 32–37)
MCV RBC AUTO: 86.2 FL — SIGNIFICANT CHANGE UP (ref 81–99)
MONOCYTES # BLD AUTO: 0.71 K/UL — HIGH (ref 0.1–0.6)
MONOCYTES NFR BLD AUTO: 10.1 % — HIGH (ref 1.7–9.3)
NEUTROPHILS # BLD AUTO: 4.29 K/UL — SIGNIFICANT CHANGE UP (ref 1.4–6.5)
NEUTROPHILS NFR BLD AUTO: 61.3 % — SIGNIFICANT CHANGE UP (ref 42.2–75.2)
NRBC BLD AUTO-RTO: 0 /100 WBCS — SIGNIFICANT CHANGE UP (ref 0–0)
PLATELET # BLD AUTO: 232 K/UL — SIGNIFICANT CHANGE UP (ref 130–400)
PMV BLD: 11 FL — HIGH (ref 7.4–10.4)
POTASSIUM SERPL-MCNC: 4 MMOL/L — SIGNIFICANT CHANGE UP (ref 3.5–5)
POTASSIUM SERPL-SCNC: 4 MMOL/L — SIGNIFICANT CHANGE UP (ref 3.5–5)
PROT SERPL-MCNC: 5.4 G/DL — LOW (ref 6–8)
RBC # BLD: 3.27 M/UL — LOW (ref 4.2–5.4)
RBC # FLD: 14.6 % — HIGH (ref 11.5–14.5)
SODIUM SERPL-SCNC: 134 MMOL/L — LOW (ref 135–146)
WBC # BLD: 7.01 K/UL — SIGNIFICANT CHANGE UP (ref 4.8–10.8)
WBC # FLD AUTO: 7.01 K/UL — SIGNIFICANT CHANGE UP (ref 4.8–10.8)

## 2025-05-19 PROCEDURE — 71045 X-RAY EXAM CHEST 1 VIEW: CPT | Mod: 26

## 2025-05-19 PROCEDURE — 93010 ELECTROCARDIOGRAM REPORT: CPT | Mod: 77

## 2025-05-19 PROCEDURE — 93320 DOPPLER ECHO COMPLETE: CPT | Mod: 26

## 2025-05-19 PROCEDURE — 93312 ECHO TRANSESOPHAGEAL: CPT | Mod: 26,XU

## 2025-05-19 PROCEDURE — 99291 CRITICAL CARE FIRST HOUR: CPT

## 2025-05-19 PROCEDURE — 93325 DOPPLER ECHO COLOR FLOW MAPG: CPT | Mod: 26

## 2025-05-19 PROCEDURE — 93010 ELECTROCARDIOGRAM REPORT: CPT

## 2025-05-19 PROCEDURE — 92960 CARDIOVERSION ELECTRIC EXT: CPT | Mod: XU

## 2025-05-19 RX ORDER — ACETAMINOPHEN 500 MG/5ML
1000 LIQUID (ML) ORAL ONCE
Refills: 0 | Status: COMPLETED | OUTPATIENT
Start: 2025-05-20 | End: 2025-05-20

## 2025-05-19 RX ORDER — ACETAMINOPHEN 500 MG/5ML
1000 LIQUID (ML) ORAL ONCE
Refills: 0 | Status: COMPLETED | OUTPATIENT
Start: 2025-05-19 | End: 2025-05-19

## 2025-05-19 RX ORDER — ONDANSETRON HCL/PF 4 MG/2 ML
4 VIAL (ML) INJECTION EVERY 8 HOURS
Refills: 0 | Status: DISCONTINUED | OUTPATIENT
Start: 2025-05-19 | End: 2025-05-20

## 2025-05-19 RX ORDER — APIXABAN 2.5 MG/1
5 TABLET, FILM COATED ORAL EVERY 12 HOURS
Refills: 0 | Status: DISCONTINUED | OUTPATIENT
Start: 2025-05-19 | End: 2025-05-20

## 2025-05-19 RX ORDER — MELATONIN 5 MG
5 TABLET ORAL AT BEDTIME
Refills: 0 | Status: DISCONTINUED | OUTPATIENT
Start: 2025-05-19 | End: 2025-05-20

## 2025-05-19 RX ORDER — MAGNESIUM SULFATE 500 MG/ML
2 SYRINGE (ML) INJECTION ONCE
Refills: 0 | Status: COMPLETED | OUTPATIENT
Start: 2025-05-19 | End: 2025-05-19

## 2025-05-19 RX ORDER — FUROSEMIDE 10 MG/ML
20 INJECTION INTRAMUSCULAR; INTRAVENOUS ONCE
Refills: 0 | Status: COMPLETED | OUTPATIENT
Start: 2025-05-19 | End: 2025-05-19

## 2025-05-19 RX ORDER — APIXABAN 2.5 MG/1
5 TABLET, FILM COATED ORAL ONCE
Refills: 0 | Status: COMPLETED | OUTPATIENT
Start: 2025-05-19 | End: 2025-05-19

## 2025-05-19 RX ADMIN — POLYETHYLENE GLYCOL 3350 17 GRAM(S): 17 POWDER, FOR SOLUTION ORAL at 12:19

## 2025-05-19 RX ADMIN — Medication 20 MILLIEQUIVALENT(S): at 06:13

## 2025-05-19 RX ADMIN — Medication 20 MILLIGRAM(S): at 06:14

## 2025-05-19 RX ADMIN — Medication 400 MILLIGRAM(S): at 18:16

## 2025-05-19 RX ADMIN — FUROSEMIDE 20 MILLIGRAM(S): 10 INJECTION INTRAMUSCULAR; INTRAVENOUS at 18:15

## 2025-05-19 RX ADMIN — Medication 1000 MILLIGRAM(S): at 12:50

## 2025-05-19 RX ADMIN — Medication 1 APPLICATION(S): at 06:14

## 2025-05-19 RX ADMIN — Medication 2 TABLET(S): at 22:23

## 2025-05-19 RX ADMIN — OXYCODONE HYDROCHLORIDE 5 MILLIGRAM(S): 30 TABLET ORAL at 20:17

## 2025-05-19 RX ADMIN — FUROSEMIDE 20 MILLIGRAM(S): 10 INJECTION INTRAMUSCULAR; INTRAVENOUS at 06:14

## 2025-05-19 RX ADMIN — Medication 5 MILLIGRAM(S): at 22:23

## 2025-05-19 RX ADMIN — ATORVASTATIN CALCIUM 40 MILLIGRAM(S): 80 TABLET, FILM COATED ORAL at 22:23

## 2025-05-19 RX ADMIN — Medication 1000 MILLIGRAM(S): at 06:56

## 2025-05-19 RX ADMIN — APIXABAN 5 MILLIGRAM(S): 2.5 TABLET, FILM COATED ORAL at 09:40

## 2025-05-19 RX ADMIN — Medication 20 MILLIGRAM(S): at 18:16

## 2025-05-19 RX ADMIN — Medication 25 GRAM(S): at 06:13

## 2025-05-19 RX ADMIN — Medication 5 MILLIGRAM(S): at 06:14

## 2025-05-19 RX ADMIN — Medication 400 MILLIGRAM(S): at 12:19

## 2025-05-19 RX ADMIN — Medication 20 MILLIEQUIVALENT(S): at 12:19

## 2025-05-19 RX ADMIN — METOPROLOL SUCCINATE 12.5 MILLIGRAM(S): 50 TABLET, EXTENDED RELEASE ORAL at 18:15

## 2025-05-19 RX ADMIN — Medication 5 MILLIGRAM(S): at 18:16

## 2025-05-19 RX ADMIN — APIXABAN 5 MILLIGRAM(S): 2.5 TABLET, FILM COATED ORAL at 18:16

## 2025-05-19 RX ADMIN — Medication 400 MILLIGRAM(S): at 06:40

## 2025-05-19 RX ADMIN — Medication 81 MILLIGRAM(S): at 12:19

## 2025-05-19 NOTE — CHART NOTE - NSCHARTNOTEFT_GEN_A_CORE
POST OPERATIVE PROCEDURAL DOCUMENTATION  PRE-OP DIAGNOSIS: Aflutter with variable AV block    POST-OP DIAGNOSIS: successful cardioversion to NSR using 200 J     PROCEDURE: Transesophageal Echocardiogram and DCCV    Primary Physician: Dr. Barber  Cardiology Fellow: Dr Jerrell Vu    ANESTHESIA TYPE  [  ] General Anesthesia  [ x ] Conscious Sedation  [  ] Local/Regional    CONDITION  [  ] Critical  [  ] Serious  [  ] Fair  [ x ] Good    SPECIMENS REMOVED (IF APPLICABLE): N/A    IMPLANTS (IF APPLICABLE): None    ESTIMATED BLOOD LOSS: None    COMPLICATIONS: None    After risks and benefits of procedures were explained, informed consent was obtained and placed in chart.   The patient received topical anesthetic to the oropharynx with viscous lidocaine and benzocaine spray.  Refer to Anesthesia note for sedation details.  The CAMRYN probe was passed into the esophagus without difficulty.  Transesophageal and transgastric images were obtained.  The CAMRYN probe was removed without difficulty and examined.  There was no evidence for bleeding.  The patient tolerated the procedure well without any immediate CAMRYN-related complications.      Preliminary Findings:  LA: moderately enlarged  DONNIE: Left atrial appendage was clear of clot and smoke. prominent pectinate muscles   LV: LVEF was estimated at 55-65%  MV: bioprosthetic valve seen and assessed with trace MR seen, normal coaptation, no perivalvular leak   AV: No AI, no  AS  RA: Mildly enlarged  RV: Normal size and function  TV: mild TR  PV: not assessed   IAS: not assessed   Aorta: There was mild, non-mobile atheroma seen in the thoracic aorta.    Patient successfully converted to sinus rhythm with 200 J of synchronized direct current cardioversion (DCCV) via AP pads.   EKG 12 LEADS showed NSR with 1st degree AV block      DIAGNOSIS/IMPRESSION:    Full report to follow    PLAN OF CARE:  [x] Continue eliquis  [x] No eating or drinking for 1 hour  [x] EP eval to interrogate device. consider removing epicardial pacing wires   [x] No driving for 24 hours  [x] rest of care as per CTU team     Results of procedure/ plan of care discussed with patient/  in detail. TRANSESOPHAGEAL ECHOCARDIOGRAM PROCEDURE NOTE    PRE-OP DIAGNOSIS:  Aflutter    POST-OP DIAGNOSIS:  No DONNIE thrombus.  Successful cardioversion to SR.    PROCEDURE:  Transesophageal echocardiogram and DC cardioversion    Primary Physician:  Sunil  Fellow:  Jerrell Barcenas    ANESTHESIA TYPE  [ ]  General Anesthesia  [X] Conscious Sedation  [ ]  Local/Regional    CONDITION  [ ] Critical  [ ] Serious  [ ] Fair  [X] Good    SPECIMENS REMOVED (IF APPLICABLE):  N/A    IMPLANTS (IF APPLICABLE):  N/A    ESTIMATED BLOOD LOSS:  None    COMPLICATIONS:  None    Risks and benefits of procedures were explained and informed consent was obtained.   Topical anesthetic to the oropharynx with viscous lidocaine and benzocaine spray.  Refer to Anesthesia documentation for sedation details.  The CAMRYN probe was passed into the esophagus and removed without difficulty.  No evidence of bleeding.  The patient tolerated the procedure well without any immediate complications.    Preliminary Findings:  No DONNIE thrombus  Normal functioning bioprosthetic mitral valve.  Trivial valvular MR.  Mild TR    Successful cardioversion to SR with 2090 J x 1.    Full report to follow.

## 2025-05-19 NOTE — PROGRESS NOTE ADULT - SUBJECTIVE AND OBJECTIVE BOX
CTU Attending Progress Daily Note     Procedure: MVR     He has history of Gastric ulcer, h/o smoking, HTN    Benign essential HTN    Hyperlipidemia    Mitral regurgitation      HPI:75 y/o female with pmx of covid/smoking, HTN and elective MVR      Hospital Course:  5/12 Patient arrived to ICU , sedated on ventilator, large A-a gradient, coagulopathy, received 2 units rbc, 2 units platelets, cryo x2 and 26 mcg of DDAVP  5/13 extubated   5/14: POD # 2 - EKG- NSR - accelerated junctional - lasix 20 mg BID, OOBTC, DC chest tube after walking - Had low HR/ high degree AV block alternating with first degree - paced at 60/mt - intermittently - EP consulted and monitor at this time - lytes recheck send- Possibly need pacer if no recovery   5/15: POD# 3, increased lasix to 20 mg TID with metolazone 5 mg, negative 1 liter so far  - CXR b/l effusion, US showed right effusion - thoracentesis with 450 ml bloody fluid removed, DC a line, walk around - diuretics to keep negative, labs at 2 pm with CXR follow up   5/18:  On lopressor 12.5 mg BID - Pacer backup VVI 45 output 14 - in Aflutter - started lopressor 12.5  5/19: CAMRYN cardioversion, started eliquis    OBJECTIVE:  ICU Vital Signs Last 24 Hrs  T(C): 36.6 (19 May 2025 09:57), Max: 36.9 (19 May 2025 08:00)  T(F): 98.4 (19 May 2025 08:00), Max: 98.4 (19 May 2025 08:00)  HR: 98 (19 May 2025 16:00) (48 - 98)  BP: 117/68 (19 May 2025 16:00) (100/57 - 136/64)  BP(mean): 88 (19 May 2025 16:00) (73 - 92)  ABP: --  ABP(mean): --  RR: 22 (19 May 2025 16:00) (15 - 27)  SpO2: 96% (19 May 2025 16:00) (93% - 96%)    O2 Parameters below as of 19 May 2025 16:00  Patient On (Oxygen Delivery Method): room air          I&O's Summary    18 May 2025 07:01  -  19 May 2025 07:00  --------------------------------------------------------  IN: 1430 mL / OUT: 1400 mL / NET: 30 mL    19 May 2025 07:01  -  19 May 2025 18:38  --------------------------------------------------------  IN: 800 mL / OUT: 1000 mL / NET: -200 mL      I&O's Detail    18 May 2025 07:01  -  19 May 2025 07:00  --------------------------------------------------------  IN:    IV PiggyBack: 350 mL    Oral Fluid: 1080 mL  Total IN: 1430 mL    OUT:    Voided (mL): 1400 mL  Total OUT: 1400 mL    Total NET: 30 mL      19 May 2025 07:01  -  19 May 2025 18:38  --------------------------------------------------------  IN:    IV PiggyBack: 200 mL    Oral Fluid: 600 mL  Total IN: 800 mL    OUT:    Voided (mL): 1000 mL  Total OUT: 1000 mL    Total NET: -200 mL        Adult Advanced Hemodynamics Last 24 Hrs  CVP(mm Hg): --  CVP(cm H2O): --  CO: --  CI: --  PA: --  PA(mean): --  PCWP: --  SVR: --  SVRI: --  PVR: --  PVRI: --    CAPILLARY BLOOD GLUCOSE        LABS:                          9.3    7.01  )-----------( 232      ( 19 May 2025 02:03 )             28.2     05-19    134[L]  |  97[L]  |  25[H]  ----------------------------<  101[H]  4.0   |  25  |  1.0    Ca    8.2[L]      19 May 2025 02:03  Mg     1.8     05-19    TPro  5.4[L]  /  Alb  3.1[L]  /  TBili  0.3  /  DBili  x   /  AST  13  /  ALT  10  /  AlkPhos  51  05-19      Urinalysis Basic - ( 19 May 2025 02:03 )    Color: x / Appearance: x / SG: x / pH: x  Gluc: 101 mg/dL / Ketone: x  / Bili: x / Urobili: x   Blood: x / Protein: x / Nitrite: x   Leuk Esterase: x / RBC: x / WBC x   Sq Epi: x / Non Sq Epi: x / Bacteria: x        Home Medications:  aspirin 81 mg oral tablet: orally once a day (12 May 2025 06:55)  aspirin 81 mg po q day:  (12 May 2025 06:55)  Famotidine: 40 milligram(s) orally once a day (12 May 2025 06:55)  loratadine po q day:  (12 May 2025 06:55)  losartan hctz 50/12.5 mg po q day :  (12 May 2025 06:55)  Meloxicam: 15 milligram(s) orally once a day (12 May 2025 06:55)    HOSPITAL MEDICATIONS:  MEDICATIONS  (STANDING):  apixaban 5 milliGRAM(s) Oral every 12 hours  aspirin enteric coated 81 milliGRAM(s) Oral daily  atorvastatin 40 milliGRAM(s) Oral at bedtime  bisacodyl 5 milliGRAM(s) Oral every 12 hours  bisacodyl Suppository 10 milliGRAM(s) Rectal once  chlorhexidine 2% Cloths 1 Application(s) Topical daily  dextrose 5%. 1000 milliLiter(s) (50 mL/Hr) IV Continuous <Continuous>  dextrose 5%. 1000 milliLiter(s) (100 mL/Hr) IV Continuous <Continuous>  famotidine    Tablet 20 milliGRAM(s) Oral two times a day  furosemide   Injectable 20 milliGRAM(s) IV Push every 12 hours  glucagon  Injectable 1 milliGRAM(s) IntraMuscular once  meperidine     Injectable 25 milliGRAM(s) IV Push once  metoprolol tartrate 12.5 milliGRAM(s) Oral every 12 hours  polyethylene glycol 3350 17 Gram(s) Oral daily  potassium chloride    Tablet ER 20 milliEquivalent(s) Oral daily  senna 2 Tablet(s) Oral at bedtime    MEDICATIONS  (PRN):  fentaNYL    Injectable 25 MICROGram(s) IV Push every 4 hours PRN Severe Pain (7 - 10)  ondansetron Injectable 4 milliGRAM(s) IV Push every 8 hours PRN Nausea and/or Vomiting  oxyCODONE    IR 5 milliGRAM(s) Oral every 4 hours PRN Moderate Pain (4 - 6)  oxyCODONE    IR 10 milliGRAM(s) Oral every 4 hours PRN Severe Pain (7 - 10)    RADIOLOGY:  Chest X-ray Reviewed    REVIEW OF SYSTEMS:  CONSTITUTIONAL: [X] all negative; [ ] weakness, [ ] fevers, [ ] chills  EYES/ENT: [X] all negative; [ ] visual changes, [ ] vertigo, [ ] throat pain   NECK: [X] all negative; [ ] pain, [ ] stiffness  RESPIRATORY: [] all negative, [ ] cough, [ ] wheezing, [ ] hemoptysis, [ ] shortness of breath  CARDIOVASCULAR: [] all negative; [ ] chest pain, [ ] palpitations, [ ] orthopnea  GASTROINTESTINAL: [X] all negative; [ ]abdominal pain, [ ] nausea, [ ] vomiting, [ ] hematemesis, [ ] diarrhea, [ ] constipation, [ ] melena, [ ] hematochezia.  GENITOURINARY: [X] all negative; [ ] dysuria, [ ] frequency, [ ] hematuria  NEUROLOGICAL: [X] all negative; [ ] numbness, [ ] weakness  SKIN: [X] all negative; [ ] itching, [ ] burning, [ ] rashes, [ ] lesions   All other review of systems is negative unless indicated above.  [  ] Unable to assess ROS because     PHYSICAL EXAM:          CONSTITUTIONAL: in no acute distress.   	SKIN: warm, dry  	HEAD: Normocephalic; atraumatic.  	EYES: PERRL, EOM, no conj injection, sclera clear  	ENT: No nasal discharge; airway clear.  	NECK: Supple; non tender.   	CARD: S1, S2 normal; no murmurs, gallops, or rubs. Regular rate and rhythm.  no carotid bruits  	RESP: CTA B/L; good air movement No wheezes, rales or rhonchi.  	ABD: Soft, not tender, not distended,  no rebound or guarding, bowel sounds present  	EXT: Normal ROM.  No clubbing, cyanosis or edema.   warm and well perfused.  pulses palpable  	NEURO: Alert, awake, motor 5/5 R, 5/5 L    Assessment  75 y/o female sp MVR    Plan:    Neuro:  Multimodal pain management  Tylenol, Lidoderm patch, gabapentin, opiates as needed  Normal neuro status in the post op period    CV:  S/P MVR  Maintain MAP > 65  ASA, statin, lasix BID   S/p thoracentesis on 5/15  Aflutter 4:1 - chel  EP following  Cardioversion / CAMRYN done today  Now in Sinus HR 80s - intermittent chel  Pacer wires disconnected - undersensing  EP following  Continue BB  May need PPM or MCOT on discharge  Apixaban for AC  Cut pacing wires tomorrow    Resp:  Supplemental oxygen to maintain sats > 92%  Continuous pulse oximetry monitoring  IS / Chest PT  Nebulizers as needed    GI:  Diet   Bowel regimen  PUD ppx per protocol    Renal  good UOP No JAGDEEP post surgery  Monitor UOP, lytes, creatinine  Diuretics for negative fluid balance  Keep K > 4, Mg > 2    Endo:  Tight glucose control per CTICU protocol  Insulin SSI as needed - no DM     Heme:  s/p Acute blood loss anemia post surgery  High risk for thrombocytopenia  Apixaban    ID:  Monitor fever curve and WBC count  Remove TLC    Upon my evaluation, the above patient has a high probability of imminent or life threatening deterioration due to a high risk for Shock, Cardiogenic shock, arrythmia, acute blood loss, acute kidney injury and acute pulmonary insufficiency which required my direct attention, intervention, and personal management.  Total critical care time indicated in the note includes review of radiology results, ordering, interpreting and reviewing diagnostic studies / lab tests with immediate assessment and treatment, consultant collaboration on findings and treatment options, monitoring for potential decompensation, obtaining history from family, EMT, nursing home staff and/or treating physicians; directing the titration of pressors, oxygen support devices, fluid resuscitation, interpretation of cardiac output measurements, interpretation of radiological assessments, interpretation of EKG / rhythm strips, telemetry.  This time did not overlap with the management of other patients or performing separately reportable procedures.      Management of this patient was discussed with the CT Surgery/Thoracic surgery/Structural Cardiology attending and mid-level providers.    I acknowledge the use of copied documentation.  All copy forward documentation is my own and has been reviewed and revised as appropriate.  If no changes were made, it is because that information remains the same.

## 2025-05-19 NOTE — PROGRESS NOTE ADULT - SUBJECTIVE AND OBJECTIVE BOX
OPERATIVE PROCEDURE(s):       mvr         POD # 7    SURGEON(s): elenita      SUBJECTIVE ASSESSMENT: pt was complaining of nausea earlier that resolved with zofran and she underwent a barney / cardioversion and converted to SR    Vital Signs Last 24 Hrs  T(C): 36.6 (19 May 2025 09:57), Max: 36.9 (18 May 2025 16:00)  T(F): 98.4 (19 May 2025 08:00), Max: 98.4 (18 May 2025 16:00)  HR: 97 (19 May 2025 12:00) (48 - 97)  BP: 127/62 (19 May 2025 12:00) (93/55 - 136/64)  BP(mean): 86 (19 May 2025 12:00) (68 - 92)  RR: 21 (19 May 2025 12:00) (17 - 27)  SpO2: 95% (19 May 2025 12:00) (93% - 96%)    Parameters below as of 19 May 2025 12:00  Patient On (Oxygen Delivery Method): room air      05-18-25 @ 07:01  -  05-19-25 @ 07:00  --------------------------------------------------------  IN: 1430 mL / OUT: 1400 mL / NET: 30 mL    05-19-25 @ 07:01  -  05-19-25 @ 13:49  --------------------------------------------------------  IN: 0 mL / OUT: 500 mL / NET: -500 mL    Physical Exam:  General: NAD; A&Ox3, no focal deficits, clear speech   Cardiac: S1/S2, RRR, no murmur, no rubs  Lungs: unlabored respirations, CTA b/l, no wheeze, no rales, no crackles  Abdomen: Soft/NT/ND; positive bowel sounds x 4  Sternum: Intact, no click, incision healing well with no drainage  Incisions: Incisions clean/dry/intact  Extremities: No edema b/l lower extremities; good capillary refill; no cyanosis; palpable 1+ pedal pulses b/l    LABS:                        9.3    7.01  )-----------( 232      ( 19 May 2025 02:03 )             28.2     COUMADIN:   [ ] YES [ x] NO      05-19    134[L]  |  97[L]  |  25[H]  ----------------------------<  101[H]  4.0   |  25  |  1.0    Ca    8.2[L]      19 May 2025 02:03  Mg     1.8     05-19    TPro  5.4[L]  /  Alb  3.1[L]  /  TBili  0.3  /  DBili  x   /  AST  13  /  ALT  10  /  AlkPhos  51  05-19    Urinalysis Basic - ( 19 May 2025 02:03 )    Color: x / Appearance: x / SG: x / pH: x  Gluc: 101 mg/dL / Ketone: x  / Bili: x / Urobili: x   Blood: x / Protein: x / Nitrite: x   Leuk Esterase: x / RBC: x / WBC x   Sq Epi: x / Non Sq Epi: x / Bacteria: x    MEDICATIONS  (STANDING):  acetaminophen   IVPB .. 1000 milliGRAM(s) IV Intermittent once  aspirin enteric coated 81 milliGRAM(s) Oral daily  atorvastatin 40 milliGRAM(s) Oral at bedtime  bisacodyl 5 milliGRAM(s) Oral every 12 hours  bisacodyl Suppository 10 milliGRAM(s) Rectal once  chlorhexidine 2% Cloths 1 Application(s) Topical daily  dextrose 5%. 1000 milliLiter(s) (50 mL/Hr) IV Continuous <Continuous>  dextrose 5%. 1000 milliLiter(s) (100 mL/Hr) IV Continuous <Continuous>  famotidine    Tablet 20 milliGRAM(s) Oral two times a day  furosemide   Injectable 20 milliGRAM(s) IV Push every 12 hours  glucagon  Injectable 1 milliGRAM(s) IntraMuscular once  meperidine     Injectable 25 milliGRAM(s) IV Push once  metoprolol tartrate 12.5 milliGRAM(s) Oral every 12 hours  polyethylene glycol 3350 17 Gram(s) Oral daily  potassium chloride    Tablet ER 20 milliEquivalent(s) Oral daily  senna 2 Tablet(s) Oral at bedtime    MEDICATIONS  (PRN):  fentaNYL    Injectable 25 MICROGram(s) IV Push every 4 hours PRN Severe Pain (7 - 10)  ondansetron Injectable 4 milliGRAM(s) IV Push every 8 hours PRN Nausea and/or Vomiting  oxyCODONE    IR 5 milliGRAM(s) Oral every 4 hours PRN Moderate Pain (4 - 6)  oxyCODONE    IR 10 milliGRAM(s) Oral every 4 hours PRN Severe Pain (7 - 10)      Allergies    No Known Allergies    Intolerances        Ambulation/Activity Status:  amb with pt      RADIOLOGY & ADDITIONAL TESTS:  Xray Chest 1 View- PORTABLE-Routine: AM   Indication: Shortness of Breath  Transport: Portable,  w/ Monitor  Provider's Contact #: [phone] (05-19-25 @ 08:58)    ACC: [id number] EXAM:  XR CHEST PORTABLE ROUTINE 1V   ORDERED BY: JR KERR     PROCEDURE DATE:  05/19/2025      INTERPRETATION:  Clinical History / Reason for exam: s/p mvr    Comparison : Chest radiograph: May 18 2025    Technique/Positioning: Frontal view of the chest    Findings/  impression:    Support devices: Stable positioning    Cardiac/mediastinum/hilum: No significant change    Skeleton/soft tissues: No significant change.    Lung parenchyma/Pleura: Low lung volume. Bibasilar opacities. No evidence   of pneumothorax.    --- End of Report ---    Assessment/Plan:  74y Female status-post MVR POD#7  - Case and plan discussed with CTU Intensivist and CT Surgeon - Dr. Guillaume  - Continue CTU supportive care and ongoing plan of care as per continuing CTU rounds.   - Continue DVT/GI prophylaxis  - Incentive Spirometry 10 times an hour  - Continue to advance physical activity as tolerated and continue PT/OT as directed  1. MVR:  Continue ASA, statin, low dose bb, replete electrolytes PRN. pain control  2. HTN: Continue lopressor as tolerated by HR/BP  3. Post-op A. Fib ppx: monitor electrolytes, cont low dose bb, vitamin c- pt went into a. flutter- on bb, awaiting follow up from EP regarding whether or not pt needs a ppm  4. POST-op Hypoxia: Coughing and deep breathing exercises/incentive spirometry encouraged/chest PT, lasix for noncardiogenic fluid overload   5. DM/Glucose Control: FS ACHS with coverage for glycemic control  6. High grade AV block: EP consulted, Recs appreciated. Will monitor for now  7 b/l pleural effusions right > left- s/p thoracentesis draining 450 blood tinged fluid   anticoagulation to be started after EP determines whether or not PPM is indicated    Social Service Disposition:  Home with home care

## 2025-05-19 NOTE — PRE-ANESTHESIA EVALUATION ADULT - LAST ECHOCARDIOGRAM
LOW NORMAL LV FUNCTION. EF 50-55%. NORMAL RV. MOD MR. MILD AI AND TR
LOW NORMAL LV FUNCTION. EF 50-55%. NORMAL RV. MOD MR. MILD AI AND TR

## 2025-05-19 NOTE — CHART NOTE - NSCHARTNOTEFT_GEN_A_CORE
Pre Procedure Note:  Patient Name: TAMIKO MASON   Age:74y  MRN: [id number]  Location: [address]  Procedure Service:  Cardiology   Procedure Name: Transesophageal Echo + DCCV    Pre Procedure Evaluation:    HPI: 74yyear old Female presents for Transesophageal echocardiogram.  Indication:  Afib post op        Any respiratory problems: Asthma, COPD, ROMEO, recent respiratory illness? NO    Any history of Atlantoaxial disease and severe generalized cervical arthritis? NO    Oral/Dental history: Any dentures, removable, loose, broken tooth/teeth, mouth sores? NO    Significant dysphagia and odynophagia? NO    Any GI history of: Esophageal surgeries, strictures, diverticula, masses, varices, diaphragmatic hernia, stomach ulcers, stomach surgeries, bariatric surgery, GI bleed? NO    ALLERGIES: No Known Allergies        REVIEW OF SYSTEMS:  Negative except as mentioend in H&P    PHYSICAL EXAM:  Vital Signs:  T(C): 36.6 (05-19-25 @ 04:00), Max: 36.9 (05-18-25 @ 16:00)  HR: 63 (05-19-25 @ 07:00) (48 - 78)  BP: 122/64 (05-19-25 @ 06:00) (88/51 - 136/64)  RR: 20 (05-19-25 @ 07:00) (17 - 27)  SpO2: 95% (05-19-25 @ 07:00) (93% - 97%)    Appearance: Normal	  HEENT:   Normal oral mucosa, PERRL, EOMI	  Cardiovascular: irregular s1s2, no murmurs   Respiratory: decrease breath sounds bilaterally 	  Gastrointestinal:  Soft, Non-tender, + BS	  Neurologic/PSY: Non-focal, A&O x 3  Extremities: No clubbing, cyanosis or edema    LABS: reviewed    CARDIAC TESTING/STUDIES:  	    Plan: 	  CAMRYN Procedure Candidate	X YES          Attestation Statements:  I have personally seen and examined the patient.  I fully participated in the care of this patient.  I have made amendments to the documentation where necessary, and agree with the history, physical exam, and plan as documented by the Fellow. Pre-procedure Assessment:    Procedure:  CAMRYN/DCCV  Indication:  Aflutter  Cardiologist / EP:  Sylvia    Chart reviewed.  No contraindications.  I agree with the assessment and plan and noted any changes below.  05-19-25 @ 09:52

## 2025-05-19 NOTE — PACU DISCHARGE NOTE - THE ANESTHESIA ORDERS USED IN THE PACU ORDER SET WILL BE DISCONTINUED UPON TRANSFER OF THIS PATIENT
Patient on morphine gtt 0.13mcg/kg/hr Statement Selected Gtt drip for sedation changed from fentanyl to morphine. Pt on fentanyl gtt of 3.5mcg/kg/hr

## 2025-05-19 NOTE — PRE-ANESTHESIA EVALUATION ADULT - NSDENTALSD_ENT_ALL_CORE
appears normal and intact
appears normal and intact/caps/bridge/implants/missing teeth
numerical 0-10

## 2025-05-19 NOTE — CHART NOTE - NSCHARTNOTEFT_GEN_A_CORE
Registered Dietitian Follow-Up     Patient Profile Reviewed                           Yes [x]   No []     Nutrition History Previously Obtained        Yes [x]  No []       Pertinent Subjective Information: Pt currently NPO after midnight for CAMRYN cardioversion. Diet order with supplements currently pending from .    Pertinent Medical Information:   Per MD note on :  Hospital Course:   Patient arrived to ICU , sedated on ventilator, large A-a gradient, coagulopathy, recevied 2 units rbc, 2 units platelets, cryo x2 and 26 mcg of DDAVP   extubated   : POD # 2 - On NC 3 liters O2, EKG- NSR - accelerated junctional - no symptoms, CXR congested - lasix 20 mg BID, OOBTC, DC chest tube after walking  Had low HR/ high degree AV block alternating with first degree - paced at 60/mt - intermittently - EP consulted and monitor at this time - lytes recheck send- Possibly need pacer if no recovery   5/15: POD# 3, MVR - on O2 3 liters - wean off, OOBTC, 2 gram MSSO4/ KCL given, pain well controlled, Increased lasix to 20 mg TID with metalazone 5 mg, negative 1 liter so far    CXR b/l effusion, US showed right effusion - thoracentesis with 450 ml blood stained fluid removed, DC a line, walk around - diuretics to keep negative, labs at 2 pm with CXR follow up   :  On lopressor 12.5 mg BID - Pacer  with v wires ).8 sen, 45/mt, 14 MA, threshold 8   on room air, has right IJ cordis, EKG - a.flutter 4;1 - no further blocks- on BB, plan is to DC wires in am/ MCOT, as per EP CAMRYN cardioversion on       Diet order: Diet, NPO after Midnight:      NPO Start Date: 18-May-2025,   NPO Start Time: 23:59 (25 @ 13:51) [Active]  Diet, Consistent Carbohydrate/No Snacks:   DASH/TLC {Sodium & Cholesterol Restricted} (DASH)  Supplement Feeding Modality:  Oral  Glucerna Shake Cans or Servings Per Day:  1       Frequency:  Two Times a day (25 @ 13:33) [Pending Verification By Attending]    Diet, Consistent Carbohydrate/No Snacks:   DASH/TLC {Sodium & Cholesterol Restricted} (DASH) (25 @ 14:33) [Active]    Anthropometrics:  Height (cm): 162 (25 @ 09:57)  Weight (kg): 85 (25 @ 09:57)  BMI (kg/m2): 32.4 (25 @ 09:57)  IBW: 54.5 KG    Daily Weight in k.3 (-19), Weight in k.2 (-18), Weight in k.7 (-17), Weight in k.9 (-16), Weight in k.5 (-15), Weight in k.5 (-14), Weight in k.8 () -- please note edema 2+ to right ankle;  left ankle; weight fluctuations likely d/t fluid shifts     MEDICATIONS  (STANDING):  apixaban 5 milliGRAM(s) Oral once  aspirin enteric coated 81 milliGRAM(s) Oral daily  atorvastatin 40 milliGRAM(s) Oral at bedtime  bisacodyl 5 milliGRAM(s) Oral every 12 hours  bisacodyl Suppository 10 milliGRAM(s) Rectal once  chlorhexidine 2% Cloths 1 Application(s) Topical daily  dextrose 5%. 1000 milliLiter(s) (50 mL/Hr) IV Continuous <Continuous>  dextrose 5%. 1000 milliLiter(s) (100 mL/Hr) IV Continuous <Continuous>  famotidine    Tablet 20 milliGRAM(s) Oral two times a day  furosemide   Injectable 20 milliGRAM(s) IV Push every 12 hours  glucagon  Injectable 1 milliGRAM(s) IntraMuscular once  heparin   Injectable 5000 Unit(s) SubCutaneous every 8 hours  meperidine     Injectable 25 milliGRAM(s) IV Push once  metoprolol tartrate 12.5 milliGRAM(s) Oral every 12 hours  polyethylene glycol 3350 17 Gram(s) Oral daily  potassium chloride    Tablet ER 20 milliEquivalent(s) Oral daily  senna 2 Tablet(s) Oral at bedtime    MEDICATIONS  (PRN):  fentaNYL    Injectable 25 MICROGram(s) IV Push every 4 hours PRN Severe Pain (7 - 10)  ondansetron Injectable 4 milliGRAM(s) IV Push every 8 hours PRN Nausea and/or Vomiting  oxyCODONE    IR 5 milliGRAM(s) Oral every 4 hours PRN Moderate Pain (4 - 6)  oxyCODONE    IR 10 milliGRAM(s) Oral every 4 hours PRN Severe Pain (7 - 10)    Pertinent Labs:  @ 02:03: Na 134[L], BUN 25[H], Cr 1.0, [H], K+ 4.0, Phos --, Mg 1.8, Alk Phos 51, ALT/SGPT 10, AST/SGOT 13, HbA1c --    Physical Findings:  - Appearance: alert  - GI function: last BM on   - Tubes: n/a  - Oral/Mouth cavity: regular w/ thin liquids   - Skin: surgical incision; no pressure injuries noted  - Edema: edema 2+ to right ankle;  left ankle     Nutrition Requirements:  Weight Used: ABW 85 KG -- with consideration for age, BMI, surgery, critical illness setting     Estimated Energy Needs    Continue [x]  Adjust []  ENERGY: 8462-1866 kcal/day (MSJ 1341.49* 1.2-1.4 SF)     Estimated Protein Needs    Continue [x]  Adjust []  PROTEIN:  g/day (1.5-2 g/kg IBW 54.5 KG)     Estimated Fluid Needs        Continue [x]  Adjust []  FLUID: 5842-8348 mL/day (20-25 mL/kg)    [x] Previous Nutrition Diagnosis: Increased Nutrient Needs            [x] Ongoing          [] Resolved     Goal/Expected Outcome: Pt to meet at least 51-75% of estimated needs within 4 days      Indicator/Monitoring: Diet order, PO intake, weights, labs, NFPF, body composition, BM and tolerance to medical food supplements    Recommendation:  1. Please activate diet order with Glucernicolas Shake 2X/DAILY -- provides 440 kcal, 20g pro total   2. Encourage PO intake     High risk f/u    RD to remain available: Luciana Gilmore [phone] or peggy

## 2025-05-20 VITALS — HEIGHT: 63.78 IN | WEIGHT: 187.39 LBS

## 2025-05-20 LAB
ALBUMIN SERPL ELPH-MCNC: 3.7 G/DL — SIGNIFICANT CHANGE UP (ref 3.5–5.2)
ALP SERPL-CCNC: 64 U/L — SIGNIFICANT CHANGE UP (ref 30–115)
ALT FLD-CCNC: 13 U/L — SIGNIFICANT CHANGE UP (ref 0–41)
ANION GAP SERPL CALC-SCNC: 13 MMOL/L — SIGNIFICANT CHANGE UP (ref 7–14)
AST SERPL-CCNC: 17 U/L — SIGNIFICANT CHANGE UP (ref 0–41)
BASOPHILS # BLD AUTO: 0.04 K/UL — SIGNIFICANT CHANGE UP (ref 0–0.2)
BASOPHILS NFR BLD AUTO: 0.5 % — SIGNIFICANT CHANGE UP (ref 0–1)
BILIRUB SERPL-MCNC: 0.4 MG/DL — SIGNIFICANT CHANGE UP (ref 0.2–1.2)
BUN SERPL-MCNC: 21 MG/DL — HIGH (ref 10–20)
CALCIUM SERPL-MCNC: 8.9 MG/DL — SIGNIFICANT CHANGE UP (ref 8.4–10.5)
CHLORIDE SERPL-SCNC: 97 MMOL/L — LOW (ref 98–110)
CO2 SERPL-SCNC: 26 MMOL/L — SIGNIFICANT CHANGE UP (ref 17–32)
CREAT SERPL-MCNC: 0.8 MG/DL — SIGNIFICANT CHANGE UP (ref 0.7–1.5)
EGFR: 78 ML/MIN/1.73M2 — SIGNIFICANT CHANGE UP
EGFR: 78 ML/MIN/1.73M2 — SIGNIFICANT CHANGE UP
EOSINOPHIL # BLD AUTO: 0.22 K/UL — SIGNIFICANT CHANGE UP (ref 0–0.7)
EOSINOPHIL NFR BLD AUTO: 2.7 % — SIGNIFICANT CHANGE UP (ref 0–8)
GLUCOSE SERPL-MCNC: 102 MG/DL — HIGH (ref 70–99)
HCT VFR BLD CALC: 30 % — LOW (ref 37–47)
HGB BLD-MCNC: 9.8 G/DL — LOW (ref 12–16)
IMM GRANULOCYTES NFR BLD AUTO: 0.6 % — HIGH (ref 0.1–0.3)
LYMPHOCYTES # BLD AUTO: 1.84 K/UL — SIGNIFICANT CHANGE UP (ref 1.2–3.4)
LYMPHOCYTES # BLD AUTO: 22.6 % — SIGNIFICANT CHANGE UP (ref 20.5–51.1)
MAGNESIUM SERPL-MCNC: 2.2 MG/DL — SIGNIFICANT CHANGE UP (ref 1.8–2.4)
MCHC RBC-ENTMCNC: 27.9 PG — SIGNIFICANT CHANGE UP (ref 27–31)
MCHC RBC-ENTMCNC: 32.7 G/DL — SIGNIFICANT CHANGE UP (ref 32–37)
MCV RBC AUTO: 85.5 FL — SIGNIFICANT CHANGE UP (ref 81–99)
MONOCYTES # BLD AUTO: 0.78 K/UL — HIGH (ref 0.1–0.6)
MONOCYTES NFR BLD AUTO: 9.6 % — HIGH (ref 1.7–9.3)
NEUTROPHILS # BLD AUTO: 5.2 K/UL — SIGNIFICANT CHANGE UP (ref 1.4–6.5)
NEUTROPHILS NFR BLD AUTO: 64 % — SIGNIFICANT CHANGE UP (ref 42.2–75.2)
NRBC BLD AUTO-RTO: 0 /100 WBCS — SIGNIFICANT CHANGE UP (ref 0–0)
PLATELET # BLD AUTO: 276 K/UL — SIGNIFICANT CHANGE UP (ref 130–400)
PMV BLD: 9.1 FL — SIGNIFICANT CHANGE UP (ref 7.4–10.4)
POTASSIUM SERPL-MCNC: 4.7 MMOL/L — SIGNIFICANT CHANGE UP (ref 3.5–5)
POTASSIUM SERPL-SCNC: 4.7 MMOL/L — SIGNIFICANT CHANGE UP (ref 3.5–5)
PROT SERPL-MCNC: 6.1 G/DL — SIGNIFICANT CHANGE UP (ref 6–8)
RBC # BLD: 3.51 M/UL — LOW (ref 4.2–5.4)
RBC # FLD: 14.6 % — HIGH (ref 11.5–14.5)
SODIUM SERPL-SCNC: 136 MMOL/L — SIGNIFICANT CHANGE UP (ref 135–146)
WBC # BLD: 8.13 K/UL — SIGNIFICANT CHANGE UP (ref 4.8–10.8)
WBC # FLD AUTO: 8.13 K/UL — SIGNIFICANT CHANGE UP (ref 4.8–10.8)

## 2025-05-20 PROCEDURE — 99233 SBSQ HOSP IP/OBS HIGH 50: CPT

## 2025-05-20 PROCEDURE — 93010 ELECTROCARDIOGRAM REPORT: CPT

## 2025-05-20 PROCEDURE — 71045 X-RAY EXAM CHEST 1 VIEW: CPT | Mod: 26

## 2025-05-20 RX ORDER — ACETAMINOPHEN 500 MG/5ML
1000 LIQUID (ML) ORAL ONCE
Refills: 0 | Status: DISCONTINUED | OUTPATIENT
Start: 2025-05-20 | End: 2025-05-20

## 2025-05-20 RX ORDER — OXYCODONE HYDROCHLORIDE 30 MG/1
1 TABLET ORAL
Qty: 12 | Refills: 0
Start: 2025-05-20 | End: 2025-05-22

## 2025-05-20 RX ORDER — ACETAMINOPHEN 500 MG/5ML
650 LIQUID (ML) ORAL EVERY 6 HOURS
Refills: 0 | Status: DISCONTINUED | OUTPATIENT
Start: 2025-05-20 | End: 2025-05-20

## 2025-05-20 RX ORDER — FUROSEMIDE 10 MG/ML
1 INJECTION INTRAMUSCULAR; INTRAVENOUS
Qty: 0 | Refills: 0 | DISCHARGE
Start: 2025-05-20

## 2025-05-20 RX ORDER — METOPROLOL SUCCINATE 50 MG/1
1 TABLET, EXTENDED RELEASE ORAL
Qty: 60 | Refills: 0
Start: 2025-05-20 | End: 2025-06-18

## 2025-05-20 RX ORDER — APIXABAN 2.5 MG/1
1 TABLET, FILM COATED ORAL
Qty: 0 | Refills: 0 | DISCHARGE
Start: 2025-05-20

## 2025-05-20 RX ORDER — FUROSEMIDE 10 MG/ML
20 INJECTION INTRAMUSCULAR; INTRAVENOUS DAILY
Refills: 0 | Status: DISCONTINUED | OUTPATIENT
Start: 2025-05-20 | End: 2025-05-20

## 2025-05-20 RX ORDER — ACETAMINOPHEN 500 MG/5ML
2 LIQUID (ML) ORAL
Qty: 0 | Refills: 0 | DISCHARGE
Start: 2025-05-20

## 2025-05-20 RX ORDER — FUROSEMIDE 10 MG/ML
1 INJECTION INTRAMUSCULAR; INTRAVENOUS
Qty: 7 | Refills: 0
Start: 2025-05-20 | End: 2025-05-26

## 2025-05-20 RX ORDER — OXYCODONE HYDROCHLORIDE 30 MG/1
1 TABLET ORAL
Qty: 0 | Refills: 0 | DISCHARGE
Start: 2025-05-20

## 2025-05-20 RX ORDER — POLYETHYLENE GLYCOL 3350 17 G/17G
17 POWDER, FOR SOLUTION ORAL
Qty: 0 | Refills: 0 | DISCHARGE
Start: 2025-05-20

## 2025-05-20 RX ORDER — ATORVASTATIN CALCIUM 80 MG/1
1 TABLET, FILM COATED ORAL
Qty: 30 | Refills: 0
Start: 2025-05-20 | End: 2025-06-18

## 2025-05-20 RX ORDER — APIXABAN 2.5 MG/1
1 TABLET, FILM COATED ORAL
Qty: 60 | Refills: 0
Start: 2025-05-20 | End: 2025-06-18

## 2025-05-20 RX ORDER — MELOXICAM 15 MG/1
1 TABLET ORAL
Qty: 14 | Refills: 0
Start: 2025-05-20 | End: 2025-06-02

## 2025-05-20 RX ORDER — ATORVASTATIN CALCIUM 80 MG/1
1 TABLET, FILM COATED ORAL
Qty: 0 | Refills: 0 | DISCHARGE
Start: 2025-05-20

## 2025-05-20 RX ORDER — FUROSEMIDE 10 MG/ML
1 INJECTION INTRAMUSCULAR; INTRAVENOUS
Refills: 0
Start: 2025-05-20

## 2025-05-20 RX ORDER — OXYCODONE HYDROCHLORIDE 30 MG/1
5 TABLET ORAL EVERY 6 HOURS
Refills: 0 | Status: DISCONTINUED | OUTPATIENT
Start: 2025-05-20 | End: 2025-05-20

## 2025-05-20 RX ORDER — OXYCODONE HYDROCHLORIDE 30 MG/1
5 TABLET ORAL ONCE
Refills: 0 | Status: DISCONTINUED | OUTPATIENT
Start: 2025-05-20 | End: 2025-05-20

## 2025-05-20 RX ORDER — METOPROLOL SUCCINATE 50 MG/1
0.5 TABLET, EXTENDED RELEASE ORAL
Qty: 60 | Refills: 0
Start: 2025-05-20

## 2025-05-20 RX ADMIN — APIXABAN 5 MILLIGRAM(S): 2.5 TABLET, FILM COATED ORAL at 05:22

## 2025-05-20 RX ADMIN — Medication 5 MILLIGRAM(S): at 05:20

## 2025-05-20 RX ADMIN — METOPROLOL SUCCINATE 12.5 MILLIGRAM(S): 50 TABLET, EXTENDED RELEASE ORAL at 05:22

## 2025-05-20 RX ADMIN — Medication 1 APPLICATION(S): at 05:20

## 2025-05-20 RX ADMIN — Medication 400 MILLIGRAM(S): at 15:28

## 2025-05-20 RX ADMIN — FUROSEMIDE 20 MILLIGRAM(S): 10 INJECTION INTRAMUSCULAR; INTRAVENOUS at 05:21

## 2025-05-20 RX ADMIN — Medication 400 MILLIGRAM(S): at 08:54

## 2025-05-20 RX ADMIN — Medication 20 MILLIGRAM(S): at 05:20

## 2025-05-20 RX ADMIN — Medication 1000 MILLIGRAM(S): at 09:16

## 2025-05-20 RX ADMIN — Medication 81 MILLIGRAM(S): at 11:21

## 2025-05-20 RX ADMIN — Medication 20 MILLIEQUIVALENT(S): at 11:21

## 2025-05-20 RX ADMIN — Medication 20 MILLIGRAM(S): at 17:18

## 2025-05-20 RX ADMIN — Medication 1000 MILLIGRAM(S): at 05:15

## 2025-05-20 RX ADMIN — OXYCODONE HYDROCHLORIDE 5 MILLIGRAM(S): 30 TABLET ORAL at 12:59

## 2025-05-20 RX ADMIN — APIXABAN 5 MILLIGRAM(S): 2.5 TABLET, FILM COATED ORAL at 17:18

## 2025-05-20 RX ADMIN — METOPROLOL SUCCINATE 12.5 MILLIGRAM(S): 50 TABLET, EXTENDED RELEASE ORAL at 17:18

## 2025-05-20 RX ADMIN — Medication 400 MILLIGRAM(S): at 04:16

## 2025-05-20 RX ADMIN — Medication 1000 MILLIGRAM(S): at 16:14

## 2025-05-20 RX ADMIN — OXYCODONE HYDROCHLORIDE 5 MILLIGRAM(S): 30 TABLET ORAL at 11:27

## 2025-05-20 NOTE — PROGRESS NOTE ADULT - SUBJECTIVE AND OBJECTIVE BOX
INTERVAL HPI/OVERNIGHT EVENTS:  Patient sp DCCV 5/19, in SR with 1st degree AVB  No pauses overnight, feeling well    MEDICATIONS  (STANDING):  acetaminophen   IVPB .. 1000 milliGRAM(s) IV Intermittent once  apixaban 5 milliGRAM(s) Oral every 12 hours  aspirin enteric coated 81 milliGRAM(s) Oral daily  atorvastatin 40 milliGRAM(s) Oral at bedtime  bisacodyl 5 milliGRAM(s) Oral every 12 hours  bisacodyl Suppository 10 milliGRAM(s) Rectal once  chlorhexidine 2% Cloths 1 Application(s) Topical daily  dextrose 5%. 1000 milliLiter(s) (50 mL/Hr) IV Continuous <Continuous>  dextrose 5%. 1000 milliLiter(s) (100 mL/Hr) IV Continuous <Continuous>  famotidine    Tablet 20 milliGRAM(s) Oral two times a day  furosemide   Injectable 20 milliGRAM(s) IV Push every 12 hours  glucagon  Injectable 1 milliGRAM(s) IntraMuscular once  melatonin 5 milliGRAM(s) Oral at bedtime  meperidine     Injectable 25 milliGRAM(s) IV Push once  metoprolol tartrate 12.5 milliGRAM(s) Oral every 12 hours  polyethylene glycol 3350 17 Gram(s) Oral daily  potassium chloride    Tablet ER 20 milliEquivalent(s) Oral daily  senna 2 Tablet(s) Oral at bedtime    MEDICATIONS  (PRN):  ondansetron Injectable 4 milliGRAM(s) IV Push every 8 hours PRN Nausea and/or Vomiting      Allergies    No Known Allergies    Intolerances        REVIEW OF SYSTEMS: No CP, palpitations, dizziness or SOB     Vital Signs Last 24 Hrs  T(C): 36.4 (20 May 2025 06:00), Max: 37.1 (19 May 2025 16:00)  T(F): 97.6 (20 May 2025 06:00), Max: 98.8 (19 May 2025 16:00)  HR: 87 (20 May 2025 09:00) (83 - 101)  BP: 104/68 (20 May 2025 09:00) (99/54 - 127/62)  BP(mean): 82 (20 May 2025 09:00) (71 - 91)  RR: 20 (20 May 2025 09:00) (15 - 29)  SpO2: 94% (20 May 2025 09:00) (94% - 96%)    Parameters below as of 20 May 2025 08:00  Patient On (Oxygen Delivery Method): room air        05-19-25 @ 07:01  -  05-20-25 @ 07:00  --------------------------------------------------------  IN: 900 mL / OUT: 2000 mL / NET: -1100 mL        Physical Exam  GENERAL: In no apparent distress, well nourished, and hydrated.  EYES: EOMI, PERRLA, conjunctiva and sclera clear  NECK: Supple  HEART: Regular rate and rhythm; No murmurs, rubs, or gallops.  PULMONARY: Clear to auscultation and perfusion.  No rales, wheezing, or rhonchi bilaterally.  EXTREMITIES:  2+ Peripheral Pulses, No clubbing, cyanosis, or edema  NEUROLOGICAL: Grossly nonfocal    LABS:                        9.8    8.13  )-----------( 276      ( 20 May 2025 04:10 )             30.0     05-20    136  |  97[L]  |  21[H]  ----------------------------<  102[H]  4.7   |  26  |  0.8    Ca    8.9      20 May 2025 04:10  Mg     2.2     05-20    TPro  6.1  /  Alb  3.7  /  TBili  0.4  /  DBili  x   /  AST  17  /  ALT  13  /  AlkPhos  64  05-20      Urinalysis Basic - ( 20 May 2025 04:10 )    Color: x / Appearance: x / SG: x / pH: x  Gluc: 102 mg/dL / Ketone: x  / Bili: x / Urobili: x   Blood: x / Protein: x / Nitrite: x   Leuk Esterase: x / RBC: x / WBC x   Sq Epi: x / Non Sq Epi: x / Bacteria: x        RADIOLOGY & ADDITIONAL TESTS:

## 2025-05-20 NOTE — PROGRESS NOTE ADULT - PROVIDER SPECIALTY LIST ADULT
CT Surgery
Critical Care
CT Surgery
CT Surgery
Critical Care
Critical Care
Electrophysiology
CT Surgery
CT Surgery
Critical Care
Electrophysiology
Electrophysiology
Critical Care
Electrophysiology

## 2025-05-20 NOTE — CHART NOTE - NSCHARTNOTEFT_GEN_A_CORE
MCOT placed and all instructions given to family 5/20/25  Follow up with Dr Ward in 6 weeks    Recall EP as needed 3550

## 2025-05-20 NOTE — PROGRESS NOTE ADULT - ASSESSMENT
73 yo F with hx of HTN, HLD here for elective MVR. Found to have 2:1 AV block postop with epicardial pacing wires in place and new onset atrial flutter. Pt sp CAMRYN/DCCV yesterday now in SR and epicardial wires removed. 1st degree AVB noted.     Impression:  2:1 AVB, resolved  New Onset AFL, now NSR  Sev MR sp MVR POD#8  1st Deg AVB  HTN  HLD    Plan:  - No further episodes of bradycardia or pauses overnight  - Will likely discharge pt home with MCOT for futher cardiac monitoring  - Cont Eliquis 5mg BID for stroke prevention  - Cont Lopressor  - Monitor electrolytes  - Will discuss further with attending **   75 yo F with hx of HTN, HLD here for elective MVR. Found to have 2:1 AV block postop with epicardial pacing wires in place and new onset atrial flutter. Pt sp CAMRYN/DCCV yesterday now in SR and epicardial wires removed. 1st degree AVB noted.     Impression:  2:1 AVB, resolved  New Onset AFL, now NSR  Sev MR sp MVR POD#8  1st Deg AVB  HTN  HLD    Plan:  - No further episodes of bradycardia or pauses overnight  - Will likely discharge pt home with MCOT for further cardiac monitoring  - Cont Eliquis 5mg BID for stroke prevention  - Cont Lopressor  - Monitor electrolytes  - Will discuss further with attending **

## 2025-05-20 NOTE — PROGRESS NOTE ADULT - NS ATTEND AMEND GEN_ALL_CORE FT
Tele reviewed NSR with 1st degree AV block, blocked PACs  Continue uninterrupted Eliquis for at least 1 month  MCOT at discharge  Outpatient EP follow-up
S/p MVR, now in rate controlled AFlutter and asymptomatic    Rec  - Monitor on tele  - Full OAC for CHADS VASc at least 3   - NPO after midnight for CAMRYN/CV tomorrow  - Cont BB
Agree with plan as above  Rec OAC for CHADS VASc at least 3  Start low dose BB for AF with RVR and cont to monitor on tele

## 2025-05-20 NOTE — PROGRESS NOTE ADULT - SUBJECTIVE AND OBJECTIVE BOX
Attending Progress Daily Note       Procedure:  sp MVR 5/12/2025    He has history of Gastric ulcer, h/o smoking, HTN  Benign essential HTN  Hyperlipidemia  Mitral regurgitation      HPI:73 y/o female with pmx of covid/smoking, HTN and elective MVR      Hospital Course:  5/12 Patient arrived to ICU , sedated on ventilator, large A-a gradient, coagulopathy, received 2 units rbc, 2 units platelets, cryo x2 and 26 mcg of DDAVP  5/13 extubated   5/14: POD # 2 - EKG- NSR - accelerated junctional - lasix 20 mg BID, OOBTC, DC chest tube after walking - Had low HR/ high degree AV block alternating with first degree - paced at 60/mt - intermittently - EP consulted and monitor at this time - lytes recheck send- Possibly need pacer if no recovery   5/15: POD# 3, increased lasix to 20 mg TID with metolazone 5 mg, negative 1 liter so far  - CXR b/l effusion, US showed right effusion - thoracentesis with 450 ml bloody fluid removed, DC a line, walk around - diuretics to keep negative, labs at 2 pm with CXR follow up   5/18:  On lopressor 12.5 mg BID - Pacer backup VVI 45 output 14 - in Aflutter - started lopressor 12.5  5/19: CAMRYN cardioversion, started Eliquis  5/20 discharge home with MCOT    =============================  OBJECTIVE:  ICU Vital Signs Last 24 Hrs  T(C): 36.4 (20 May 2025 12:00), Max: 36.4 (20 May 2025 06:00)  T(F): 97.6 (20 May 2025 12:00), Max: 97.6 (20 May 2025 06:00)  HR: 94 (20 May 2025 15:00) (83 - 101)  BP: 108/62 (20 May 2025 15:00) (87/59 - 119/58)  BP(mean): 80 (20 May 2025 15:00) (64 - 86)  ABP: --  ABP(mean): --  RR: 18 (20 May 2025 15:00) (15 - 29)  SpO2: 95% (20 May 2025 15:00) (93% - 96%)    O2 Parameters below as of 20 May 2025 12:00  Patient On (Oxygen Delivery Method): room air    I&O's Summary    19 May 2025 07:01  -  20 May 2025 07:00  --------------------------------------------------------  IN: 900 mL / OUT: 2000 mL / NET: -1100 mL      I&O's Detail    19 May 2025 07:01  -  20 May 2025 07:00  --------------------------------------------------------  IN:    IV PiggyBack: 300 mL    Oral Fluid: 600 mL  Total IN: 900 mL    OUT:    Voided (mL): 2000 mL  Total OUT: 2000 mL    Total NET: -1100 mL      LABS:                          9.8    8.13  )-----------( 276      ( 20 May 2025 04:10 )             30.0     05-20    136  |  97[L]  |  21[H]  ----------------------------<  102[H]  4.7   |  26  |  0.8    Ca    8.9      20 May 2025 04:10  Mg     2.2     05-20    TPro  6.1  /  Alb  3.7  /  TBili  0.4  /  DBili  x   /  AST  17  /  ALT  13  /  AlkPhos  64  05-20      Home Medications:  acetaminophen 325 mg oral tablet: 2 tab(s) orally every 6 hours As needed Mild Pain (1 - 3) (20 May 2025 17:05)  apixaban 5 mg oral tablet: 1 tab(s) orally every 12 hours (20 May 2025 17:05)  aspirin 81 mg oral tablet: orally once a day (12 May 2025 06:55)  atorvastatin 40 mg oral tablet: 1 tab(s) orally once a day (at bedtime) (20 May 2025 17:05)  furosemide 20 mg oral tablet: 1 tab(s) orally once a day (20 May 2025 17:05)  Meloxicam: 15 milligram(s) orally once a day (12 May 2025 06:55)  oxyCODONE 5 mg oral tablet: 1 tab(s) orally every 6 hours As needed Severe Pain (7 - 10) (20 May 2025 17:05)  polyethylene glycol 3350 oral powder for reconstitution: 17 gram(s) orally once a day (20 May 2025 17:05)  potassium chloride 20 mEq oral tablet, extended release: 1 tab(s) orally once a day (20 May 2025 17:05)    HOSPITAL MEDICATIONS:  MEDICATIONS  (STANDING):  apixaban 5 milliGRAM(s) Oral every 12 hours  aspirin enteric coated 81 milliGRAM(s) Oral daily  atorvastatin 40 milliGRAM(s) Oral at bedtime  famotidine    Tablet 20 milliGRAM(s) Oral two times a day  furosemide    Tablet 20 milliGRAM(s) Oral daily  metoprolol tartrate 12.5 milliGRAM(s) Oral every 12 hours  polyethylene glycol 3350 17 Gram(s) Oral daily  potassium chloride    Tablet ER 20 milliEquivalent(s) Oral daily    MEDICATIONS  (PRN):  acetaminophen     Tablet .. 650 milliGRAM(s) Oral every 6 hours PRN Mild Pain (1 - 3)  oxyCODONE    IR 5 milliGRAM(s) Oral every 6 hours PRN Severe Pain (7 - 10)    RADIOLOGY:  Chest X-ray Reviewed    REVIEW OF SYSTEMS:  CONSTITUTIONAL: [X] all negative; [ ] weakness, [ ] fevers, [ ] chills  EYES/ENT: [X] all negative; [ ] visual changes, [ ] vertigo, [ ] throat pain   NECK: [X] all negative; [ ] pain, [ ] stiffness  RESPIRATORY: [x] all negative, [ ] cough, [ ] wheezing, [ ] hemoptysis, [ ] shortness of breath  CARDIOVASCULAR: [x] all negative; [ ] chest pain, [ ] palpitations, [ ] orthopnea  GASTROINTESTINAL: [X] all negative; [ ]abdominal pain, [ ] nausea, [ ] vomiting, [ ] hematemesis, [ ] diarrhea, [ ] constipation, [ ] melena, [ ] hematochezia.  GENITOURINARY: [X] all negative; [ ] dysuria, [ ] frequency, [ ] hematuria  NEUROLOGICAL: [X] all negative; [ ] numbness, [ ] weakness  SKIN: [X] all negative; [ ] itching, [ ] burning, [ ] rashes, [ ] lesions       PHYSICAL EXAM:          CONSTITUTIONAL: in no acute distress.   	SKIN: warm, dry  	HEAD: Normocephalic; atraumatic.  	EYES: PERRL, EOM,  	ENT: No nasal discharge; airway clear.  	NECK: Supple; non tender.   	CARD: S1, S2 normal;  Regular rate and rhythm.   	RESP: CTA B/L; good air movement No wheezes, rales or rhonchi.  	ABD: Soft, not tender, not distended,  no rebound or guarding, bowel sounds present  	EXT: Normal ROM.  No clubbing, cyanosis or edema.   warm and well perfused.  pulses palpable  	NEURO: Alert, awake, motor 5/5 R, 5/5 L    Assessment  73 y/o female sp MVR 5/12/2025    Plan:    Neuro:  Multimodal pain management  Tylenol, Lidoderm patch, gabapentin, opiates as needed  Normal neuro status in the post op period    CV:  S/P MVR  HTN  Maintain MAP > 65  ASA, statin, Metoprolol lasix daily   S/p thoracentesis on 5/15  Aflutter 4:1 - chel  EP following, Cardioversion / CAMRYN done 5/19/2025  Now in Sinus HR 80s - intermittent chel  Pacer wires disconnected - undersensing  EP MCOT on discharge  Apixaban for AC  cut pacing wires today before discharge     Resp:  Supplemental oxygen to maintain sats > 92%  Continuous pulse oximetry monitoring  IS / Chest PT  Nebulizers as needed    GI:  Diet   Bowel regimen  PUD ppx per protocol    Renal  good UOP No JAGDEEP post surgery  Monitor UOP, lytes, creatinine  Diuretics for negative fluid balance  Keep K > 4, Mg > 2    Endo:  Tight glucose control per [facility]ICU protocol  Insulin SSI as needed - no DM     Heme:  s/p Acute blood loss anemia post surgery  High risk for thrombocytopenia  Apixaban    ID:  Monitor fever curve and WBC count  Remove TLC    Patient requires continuous monitoring with:  bedside rhythm monitoring, continuous  pulse oximetry monitoring, O2 supplement titrate for O2 sat above 90%  ;   Care plan discussed with CT ICU care team and attending surgeon Dr Guillaume , and EP                        .  complex MDM  discharge home today with YESI Doyle MD  intensivist

## 2025-05-20 NOTE — PROGRESS NOTE ADULT - SUBJECTIVE AND OBJECTIVE BOX
OPERATIVE PROCEDURE(s):                POD #                       SURGEON(s): KALPANA Guillaume    HPI: 73 y/o F wit PMH of smoking, HTN, HLD who presented on 5/15 for elective MVR. now s/o MVR with Dr. Guillaume on 5/12. Post Op course complicated by new onset 2:1 AV block postop with epicardial pacing wires in place and then converted to atrial flutter. Pt sp CAMRYN/DCCV yesterday now in SR and epicardial wires removed. 1st degree AVB noted.        SUBJECTIVE ASSESSMENT: 74yFemale patient seen and examined at bedside. complained of 6/10 chest pain requiring a one time dose of oxy with q6h IV Tylenol     Vital Signs Last 24 Hrs  T(F): 97.6 (20 May 2025 12:00), Max: 98.8 (19 May 2025 16:00)  HR: 95 (20 May 2025 13:00) (83 - 101)  BP: 87/59 (20 May 2025 13:00) (87/59 - 123/72)  BP(mean): 64 (20 May 2025 13:00) (64 - 91)  ABP: --  ABP(mean): --  RR: 18 (20 May 2025 13:00) (15 - 29)  SpO2: 94% (20 May 2025 13:00) (94% - 96%)  CVP(mm Hg): --  CVP(cm H2O): --    I&O's Detail    19 May 2025 07:01  -  20 May 2025 07:00  --------------------------------------------------------  IN:    IV PiggyBack: 300 mL    Oral Fluid: 600 mL  Total IN: 900 mL    OUT:    Voided (mL): 2000 mL  Total OUT: 2000 mL        Net: I&O's Detail    18 May 2025 07:01  -  19 May 2025 07:00  --------------------------------------------------------  Total NET: 30 mL      19 May 2025 07:01  -  20 May 2025 07:00  --------------------------------------------------------  Total NET: -1100 mL        CAPILLARY BLOOD GLUCOSE    A1C with Estimated Average Glucose Result: 5.8 % (04-30-25 @ 10:34)      Physical Exam:  General: NAD; A&Ox3  Cardiac: S1/S2, RRR, no murmur, no rubs  Lungs: unlabored respirations, CTA b/l, no wheeze, no rales, no crackles  Abdomen: Soft/NT/ND; positive bowel sounds x 4  Sternum: Intact, no click, incision healing well with no drainage  Incisions: Incisions clean/dry/intact  Extremities: No edema b/l lower extremities; good capillary refill; no cyanosis; palpable 1+ pedal pulses b/l    Central Venous Catheter: Yes: critical illness, intravenous access  Day #  Hunter Catheter: Yes: critical illness; monitor strict i/o's                    Day #  EPICARDIAL WIRES:  YES                                                                         Day #  BOWEL MOVEMENT:  [] YES [] NO, If No, Timing since last BM Day #  CHEST TUBE(MS/Left/Right):  [] YES [] NO, If yes -  AIR LEAKS:  YES/NO        LABS:                        9.8[L]  8.13  )-----------( 276      ( 20 May 2025 04:10 )             30.0[L]                        9.3[L]  7.01  )-----------( 232      ( 19 May 2025 02:03 )             28.2[L]    05-20    136  |  97[L]  |  21[H]  ----------------------------<  102[H]  4.7   |  26  |  0.8  05-19    134[L]  |  97[L]  |  25[H]  ----------------------------<  101[H]  4.0   |  25  |  1.0    Ca    8.9      20 May 2025 04:10  Mg     2.2     05-20    TPro  6.1 [6.0 - 8.0]  /  Alb  3.7 [3.5 - 5.2]  /  TBili  0.4 [0.2 - 1.2]  /  DBili  x   /  AST  17 [0 - 41]  /  ALT  13 [0 - 41]  /  AlkPhos  64 [30 - 115]  05-20      Urinalysis Basic - ( 20 May 2025 04:10 )    Color: x / Appearance: x / SG: x / pH: x  Gluc: 102 mg/dL / Ketone: x  / Bili: x / Urobili: x   Blood: x / Protein: x / Nitrite: x   Leuk Esterase: x / RBC: x / WBC x   Sq Epi: x / Non Sq Epi: x / Bacteria: x        RADIOLOGY & ADDITIONAL TESTS:  CXR:   EKG:    Allergies    No Known Allergies    Intolerances      MEDICATIONS  (STANDING):  acetaminophen   IVPB .. 1000 milliGRAM(s) IV Intermittent once  acetaminophen   IVPB .. 1000 milliGRAM(s) IV Intermittent once  apixaban 5 milliGRAM(s) Oral every 12 hours  aspirin enteric coated 81 milliGRAM(s) Oral daily  atorvastatin 40 milliGRAM(s) Oral at bedtime  bisacodyl 5 milliGRAM(s) Oral every 12 hours  bisacodyl Suppository 10 milliGRAM(s) Rectal once  chlorhexidine 2% Cloths 1 Application(s) Topical daily  dextrose 5%. 1000 milliLiter(s) (50 mL/Hr) IV Continuous <Continuous>  dextrose 5%. 1000 milliLiter(s) (100 mL/Hr) IV Continuous <Continuous>  famotidine    Tablet 20 milliGRAM(s) Oral two times a day  furosemide   Injectable 20 milliGRAM(s) IV Push every 12 hours  glucagon  Injectable 1 milliGRAM(s) IntraMuscular once  melatonin 5 milliGRAM(s) Oral at bedtime  meperidine     Injectable 25 milliGRAM(s) IV Push once  metoprolol tartrate 12.5 milliGRAM(s) Oral every 12 hours  polyethylene glycol 3350 17 Gram(s) Oral daily  potassium chloride    Tablet ER 20 milliEquivalent(s) Oral daily  senna 2 Tablet(s) Oral at bedtime    MEDICATIONS  (PRN):  ondansetron Injectable 4 milliGRAM(s) IV Push every 8 hours PRN Nausea and/or Vomiting    Home Medications:  aspirin 81 mg oral tablet: orally once a day (12 May 2025 06:55)  aspirin 81 mg po q day:  (12 May 2025 06:55)  Famotidine: 40 milligram(s) orally once a day (12 May 2025 06:55)  loratadine po q day:  (12 May 2025 06:55)  losartan hctz 50/12.5 mg po q day :  (12 May 2025 06:55)  Meloxicam: 15 milligram(s) orally once a day (12 May 2025 06:55)      Pharmacologic DVT Prophylaxis [X] YES, [] NO: Contraindication:  SCD's: YES b/l    GI Prophylaxis:     Post-Op Beta-Blockers: [] Yes, [] No: contraindication:  Post-Op CCB: [] Yes, [] No: contraindication:  Post-Op Aspirin:  [] Yes, [] No: contraindication:  Post-Op Statin:  [] Yes, [] No: contraindication:    Ambulation/Activity Status:    Assessment/Plan:  74y Female status-post  - Case and plan discussed with CTU Intensivist and CT Surgeon - Dr. Guillaume/Gabriella/Jean/Librado  - Continue CTU supportive care and ongoing plan of care as per continuing CTU rounds.   - Continue DVT/GI prophylaxis  - Incentive Spirometry 10 times an hour  - Continue to advance physical activity as tolerated and continue PT/OT as directed  1. CAD s/p CABG: Continue ASA, statin, BB  2. HTN:   3. Post-op A. Fib ppx:   4. COPD/Hypoxia:   5. DM/Glucose Control:     Social Service Disposition:     OPERATIVE PROCEDURE(s):                POD #                       SURGEON(s): KALPANA Guillaume    HPI: 73 y/o F wit PMH of smoking, HTN, HLD who presented on 5/15 for elective MVR. now s/o MVR with Dr. Guillaume on 5/12. Post Op course complicated by new onset 2:1 AV block postop with epicardial pacing wires in place and then converted to atrial flutter. Pt sp CAMRYN/DCCV yesterday now in SR and epicardial wires removed. 1st degree AVB noted.  Stable heart rate overnight.       SUBJECTIVE ASSESSMENT: 74yFemale patient seen and examined at bedside. complained of 6/10 chest pain requiring a one time dose of oxy with q6h IV Tylenol     Vital Signs Last 24 Hrs  T(F): 97.6 (20 May 2025 12:00), Max: 98.8 (19 May 2025 16:00)  HR: 95 (20 May 2025 13:00) (83 - 101)  BP: 87/59 (20 May 2025 13:00) (87/59 - 123/72)  BP(mean): 64 (20 May 2025 13:00) (64 - 91)  ABP: --  ABP(mean): --  RR: 18 (20 May 2025 13:00) (15 - 29)  SpO2: 94% (20 May 2025 13:00) (94% - 96%)  CVP(mm Hg): --  CVP(cm H2O): --    I&O's Detail    19 May 2025 07:01  -  20 May 2025 07:00  --------------------------------------------------------  IN:    IV PiggyBack: 300 mL    Oral Fluid: 600 mL  Total IN: 900 mL    OUT:    Voided (mL): 2000 mL  Total OUT: 2000 mL        Net: I&O's Detail    18 May 2025 07:01  -  19 May 2025 07:00  --------------------------------------------------------  Total NET: 30 mL      19 May 2025 07:01  -  20 May 2025 07:00  --------------------------------------------------------  Total NET: -1100 mL        CAPILLARY BLOOD GLUCOSE    A1C with Estimated Average Glucose Result: 5.8 % (04-30-25 @ 10:34)      Physical Exam:  General: NAD; A&Ox3  Cardiac: S1/S2, RRR, no murmur, no rubs  Lungs: unlabored respirations, CTA b/l, no wheeze, no rales, no crackles  Abdomen: Soft/NT/ND; positive bowel sounds x 4  Sternum: Intact, no click, incision healing well with no drainage  Incisions: Incisions clean/dry/intact  Extremities: No edema b/l lower extremities; good capillary refill; no cyanosis; palpable 1+ pedal pulses b/l      LABS:                        9.8[L]  8.13  )-----------( 276      ( 20 May 2025 04:10 )             30.0[L]                        9.3[L]  7.01  )-----------( 232      ( 19 May 2025 02:03 )             28.2[L]    05-20    136  |  97[L]  |  21[H]  ----------------------------<  102[H]  4.7   |  26  |  0.8  05-19    134[L]  |  97[L]  |  25[H]  ----------------------------<  101[H]  4.0   |  25  |  1.0    Ca    8.9      20 May 2025 04:10  Mg     2.2     05-20    TPro  6.1 [6.0 - 8.0]  /  Alb  3.7 [3.5 - 5.2]  /  TBili  0.4 [0.2 - 1.2]  /  DBili  x   /  AST  17 [0 - 41]  /  ALT  13 [0 - 41]  /  AlkPhos  64 [30 - 115]  05-20      Urinalysis Basic - ( 20 May 2025 04:10 )    Color: x / Appearance: x / SG: x / pH: x  Gluc: 102 mg/dL / Ketone: x  / Bili: x / Urobili: x   Blood: x / Protein: x / Nitrite: x   Leuk Esterase: x / RBC: x / WBC x   Sq Epi: x / Non Sq Epi: x / Bacteria: x        RADIOLOGY & ADDITIONAL TESTS:  CXR:     < from: Xray Chest 1 View- PORTABLE-Routine (Xray Chest 1 View- PORTABLE-Routine in AM.) (05.20.25 @ 06:13) >  ACC: [id number] EXAM:  XR CHEST PORTABLE ROUTINE 1V   ORDERED BY: JORGE VIDAL     PROCEDURE DATE:  05/20/2025          INTERPRETATION:  CLINICAL HISTORY: Shortness of breath    COMPARISON: Chest radiograph dated May 19, 2025.    TECHNIQUE: Portable frontal chest radiograph.    FINDINGS/  IMPRESSION:    Support devices: Stable right IJ central venous catheter.    Cardiac/mediastinum/hilum: Stable.    Lung parenchyma/Pleura: Unchanged left pleural effusion and retrocardiac   opacity. No pneumothorax.    Skeleton/soft tissues: Stable.      < end of copied text >    EKG:    < from: 12 Lead ECG (05.19.25 @ 10:38) >  Ventricular Rate 94 BPM    Atrial Rate 94 BPM    P-R Interval 294 ms    QRS Duration 82 ms    Q-T Interval 352 ms    QTC Calculation(Bazett) 440 ms    P Axis 51 degrees    R Axis 8 degrees    T Axis 107 degrees    Diagnosis Line Sinus rhythm with 1st degree A-V block  Nonspecific T wave abnormality  Abnormal ECG    Confirmed by Pete Zaidi ([id number]) on 5/20/2025 3:38:04 PM    < end of copied text >      Allergies    No Known Allergies    Intolerances      MEDICATIONS  (STANDING):  acetaminophen   IVPB .. 1000 milliGRAM(s) IV Intermittent once  acetaminophen   IVPB .. 1000 milliGRAM(s) IV Intermittent once  apixaban 5 milliGRAM(s) Oral every 12 hours  aspirin enteric coated 81 milliGRAM(s) Oral daily  atorvastatin 40 milliGRAM(s) Oral at bedtime  bisacodyl 5 milliGRAM(s) Oral every 12 hours  bisacodyl Suppository 10 milliGRAM(s) Rectal once  chlorhexidine 2% Cloths 1 Application(s) Topical daily  dextrose 5%. 1000 milliLiter(s) (50 mL/Hr) IV Continuous <Continuous>  dextrose 5%. 1000 milliLiter(s) (100 mL/Hr) IV Continuous <Continuous>  famotidine    Tablet 20 milliGRAM(s) Oral two times a day  furosemide   Injectable 20 milliGRAM(s) IV Push every 12 hours  glucagon  Injectable 1 milliGRAM(s) IntraMuscular once  melatonin 5 milliGRAM(s) Oral at bedtime  meperidine     Injectable 25 milliGRAM(s) IV Push once  metoprolol tartrate 12.5 milliGRAM(s) Oral every 12 hours  polyethylene glycol 3350 17 Gram(s) Oral daily  potassium chloride    Tablet ER 20 milliEquivalent(s) Oral daily  senna 2 Tablet(s) Oral at bedtime    MEDICATIONS  (PRN):  ondansetron Injectable 4 milliGRAM(s) IV Push every 8 hours PRN Nausea and/or Vomiting    Home Medications:  aspirin 81 mg oral tablet: orally once a day (12 May 2025 06:55)  aspirin 81 mg po q day:  (12 May 2025 06:55)  Famotidine: 40 milligram(s) orally once a day (12 May 2025 06:55)  loratadine po q day:  (12 May 2025 06:55)  losartan hctz 50/12.5 mg po q day :  (12 May 2025 06:55)  Meloxicam: 15 milligram(s) orally once a day (12 May 2025 06:55)      Pharmacologic DVT Prophylaxis [X] YES, [] NO: Contraindication:  SCD's: YES b/l    GI Prophylaxis: Pepcid     Post-Op Beta-Blockers: [X] Yes, [] No: contraindication:  Post-Op Aspirin:  [X] Yes, [] No: contraindication:  Post-Op Statin:  [X] Yes, [] No: contraindication:    Ambulation/Activity Status: Ambulating with PT    Assessment/Plan:  74y Female status-post  - Case and plan discussed with CTU Intensivist and CT Surgeon - Dr. Guillaume  - Continue CTU supportive care and ongoing plan of care as per continuing CTU rounds.   - Continue DVT/GI prophylaxis  - Incentive Spirometry 10 times an hour  - Continue to advance physical activity as tolerated and continue PT/OT as directed  1. MVR: Continue ASA, statin, low dose BB as tolerated by HR and BP, pain control,   2. HTN:  Continue lopressor as tolerated by HR/BP  3. Post-op A. Fib ppx: monitor electrolytes, cont low dose bb, vitamin c- pt went into a. flutter- on bb. EP following- will hold off on PPM placement. Will discharge home with [facility].  4. POST-op Hypoxia: Coughing and deep breathing exercises/incentive spirometry encouraged/chest PT, lasix for noncardiogenic fluid overload   5. DM/Glucose Control: FS ACHS with coverage for glycemic control  6. High grade AV block: EP consulted, Recs appreciated. Will monitor for now  7. b/l pleural effusions right > left- s/p thoracentesis draining 450 blood tinged fluid   4. POST-op Hypoxia: Coughing and deep breathing exercises/incentive spirometry encouraged/chest PT, lasix for noncardiogenic fluid overload   5. DM/Glucose Control: FS ACHS with coverage for glycemic control  6. High grade AV block: EP consulted, Recs appreciated. Will monitor for now  7 b/l pleural effusions right > left- s/p thoracentesis draining 450 blood tinged fluid.         Social Service Disposition:

## 2025-05-21 ENCOUNTER — APPOINTMENT (OUTPATIENT)
Dept: CARE COORDINATION | Facility: HOME HEALTH | Age: 75
End: 2025-05-21
Payer: MEDICAID

## 2025-05-21 VITALS
RESPIRATION RATE: 12 BRPM | OXYGEN SATURATION: 96 % | SYSTOLIC BLOOD PRESSURE: 142 MMHG | DIASTOLIC BLOOD PRESSURE: 60 MMHG | HEART RATE: 84 BPM

## 2025-05-21 PROBLEM — J44.9 CHRONIC OBSTRUCTIVE PULMONARY DISEASE, UNSPECIFIED: Chronic | Status: ACTIVE | Noted: 2025-05-12

## 2025-05-21 PROBLEM — I34.0 NONRHEUMATIC MITRAL (VALVE) INSUFFICIENCY: Chronic | Status: ACTIVE | Noted: 2025-04-30

## 2025-05-21 PROBLEM — Z95.2 S/P MVR (MITRAL VALVE REPLACEMENT): Status: ACTIVE | Noted: 2025-05-21

## 2025-05-21 PROBLEM — J96.01 ACUTE RESPIRATORY FAILURE WITH HYPOXIA: Chronic | Status: ACTIVE | Noted: 2025-05-12

## 2025-05-21 PROCEDURE — 99024 POSTOP FOLLOW-UP VISIT: CPT

## 2025-05-22 ENCOUNTER — APPOINTMENT (OUTPATIENT)
Dept: CARDIOTHORACIC SURGERY | Facility: CLINIC | Age: 75
End: 2025-05-22

## 2025-05-22 ENCOUNTER — NON-APPOINTMENT (OUTPATIENT)
Age: 75
End: 2025-05-22

## 2025-05-22 VITALS
WEIGHT: 186 LBS | RESPIRATION RATE: 16 BRPM | BODY MASS INDEX: 31.76 KG/M2 | DIASTOLIC BLOOD PRESSURE: 64 MMHG | HEART RATE: 94 BPM | SYSTOLIC BLOOD PRESSURE: 124 MMHG | TEMPERATURE: 97.9 F | OXYGEN SATURATION: 94 % | HEIGHT: 64 IN

## 2025-05-22 DIAGNOSIS — Z87.891 PERSONAL HISTORY OF NICOTINE DEPENDENCE: ICD-10-CM

## 2025-05-22 DIAGNOSIS — E78.5 HYPERLIPIDEMIA, UNSPECIFIED: ICD-10-CM

## 2025-05-22 DIAGNOSIS — D68.9 COAGULATION DEFECT, UNSPECIFIED: ICD-10-CM

## 2025-05-22 DIAGNOSIS — Z95.1 PRESENCE OF AORTOCORONARY BYPASS GRAFT: ICD-10-CM

## 2025-05-22 DIAGNOSIS — J90 PLEURAL EFFUSION, NOT ELSEWHERE CLASSIFIED: ICD-10-CM

## 2025-05-22 DIAGNOSIS — Z86.16 PERSONAL HISTORY OF COVID-19: ICD-10-CM

## 2025-05-22 DIAGNOSIS — I25.10 ATHEROSCLEROTIC HEART DISEASE OF NATIVE CORONARY ARTERY WITHOUT ANGINA PECTORIS: ICD-10-CM

## 2025-05-22 DIAGNOSIS — I44.1 ATRIOVENTRICULAR BLOCK, SECOND DEGREE: ICD-10-CM

## 2025-05-22 DIAGNOSIS — Z98.890 OTHER SPECIFIED POSTPROCEDURAL STATES: ICD-10-CM

## 2025-05-22 DIAGNOSIS — J44.9 CHRONIC OBSTRUCTIVE PULMONARY DISEASE, UNSPECIFIED: ICD-10-CM

## 2025-05-22 DIAGNOSIS — I48.92 UNSPECIFIED ATRIAL FLUTTER: ICD-10-CM

## 2025-05-22 DIAGNOSIS — J96.01 ACUTE RESPIRATORY FAILURE WITH HYPOXIA: ICD-10-CM

## 2025-05-22 DIAGNOSIS — I10 ESSENTIAL (PRIMARY) HYPERTENSION: ICD-10-CM

## 2025-05-22 DIAGNOSIS — I34.0 NONRHEUMATIC MITRAL (VALVE) INSUFFICIENCY: ICD-10-CM

## 2025-05-22 PROCEDURE — 99024 POSTOP FOLLOW-UP VISIT: CPT

## 2025-05-22 RX ORDER — METOPROLOL TARTRATE 25 MG/1
25 TABLET ORAL
Refills: 0 | Status: ACTIVE | COMMUNITY

## 2025-05-22 RX ORDER — APIXABAN 5 MG/1
5 TABLET, FILM COATED ORAL
Refills: 0 | Status: ACTIVE | COMMUNITY

## 2025-05-23 ENCOUNTER — NON-APPOINTMENT (OUTPATIENT)
Age: 75
End: 2025-05-23

## 2025-05-27 ENCOUNTER — NON-APPOINTMENT (OUTPATIENT)
Age: 75
End: 2025-05-27

## 2025-05-29 ENCOUNTER — APPOINTMENT (OUTPATIENT)
Dept: CARDIOTHORACIC SURGERY | Facility: CLINIC | Age: 75
End: 2025-05-29
Payer: MEDICAID

## 2025-05-29 VITALS
SYSTOLIC BLOOD PRESSURE: 114 MMHG | DIASTOLIC BLOOD PRESSURE: 75 MMHG | WEIGHT: 186 LBS | TEMPERATURE: 98.5 F | BODY MASS INDEX: 31.76 KG/M2 | HEIGHT: 64 IN | OXYGEN SATURATION: 97 % | HEART RATE: 99 BPM | RESPIRATION RATE: 14 BRPM

## 2025-05-29 DIAGNOSIS — Z95.2 PRESENCE OF PROSTHETIC HEART VALVE: ICD-10-CM

## 2025-05-29 PROCEDURE — 99024 POSTOP FOLLOW-UP VISIT: CPT

## 2025-05-29 RX ORDER — POTASSIUM CHLORIDE 20 MEQ
20 TABLET, EXT RELEASE, PARTICLES/CRYSTALS ORAL
Refills: 0 | Status: ACTIVE | COMMUNITY

## 2025-06-05 ENCOUNTER — APPOINTMENT (OUTPATIENT)
Facility: CLINIC | Age: 75
End: 2025-06-05
Payer: MEDICAID

## 2025-06-05 VITALS
SYSTOLIC BLOOD PRESSURE: 118 MMHG | HEART RATE: 98 BPM | WEIGHT: 185 LBS | DIASTOLIC BLOOD PRESSURE: 80 MMHG | BODY MASS INDEX: 31.76 KG/M2

## 2025-06-05 PROCEDURE — 93000 ELECTROCARDIOGRAM COMPLETE: CPT

## 2025-06-05 PROCEDURE — 99214 OFFICE O/P EST MOD 30 MIN: CPT

## 2025-06-10 ENCOUNTER — NON-APPOINTMENT (OUTPATIENT)
Age: 75
End: 2025-06-10

## 2025-06-12 ENCOUNTER — APPOINTMENT (OUTPATIENT)
Dept: CARDIOTHORACIC SURGERY | Facility: CLINIC | Age: 75
End: 2025-06-12
Payer: MEDICAID

## 2025-06-12 VITALS
HEIGHT: 64 IN | TEMPERATURE: 98.3 F | DIASTOLIC BLOOD PRESSURE: 75 MMHG | OXYGEN SATURATION: 95 % | WEIGHT: 179 LBS | BODY MASS INDEX: 30.56 KG/M2 | HEART RATE: 90 BPM | RESPIRATION RATE: 16 BRPM | SYSTOLIC BLOOD PRESSURE: 109 MMHG

## 2025-06-12 PROCEDURE — 99024 POSTOP FOLLOW-UP VISIT: CPT

## 2025-07-14 ENCOUNTER — NON-APPOINTMENT (OUTPATIENT)
Age: 75
End: 2025-07-14